# Patient Record
Sex: FEMALE | Race: BLACK OR AFRICAN AMERICAN | Employment: UNEMPLOYED | URBAN - METROPOLITAN AREA
[De-identification: names, ages, dates, MRNs, and addresses within clinical notes are randomized per-mention and may not be internally consistent; named-entity substitution may affect disease eponyms.]

---

## 2017-01-03 ENCOUNTER — TELEPHONE (OUTPATIENT)
Dept: INTERNAL MEDICINE CLINIC | Age: 62
End: 2017-01-03

## 2017-01-09 ENCOUNTER — OFFICE VISIT (OUTPATIENT)
Dept: INTERNAL MEDICINE CLINIC | Age: 62
End: 2017-01-09

## 2017-01-09 VITALS
HEART RATE: 93 BPM | BODY MASS INDEX: 40.67 KG/M2 | OXYGEN SATURATION: 97 % | HEIGHT: 62 IN | RESPIRATION RATE: 16 BRPM | WEIGHT: 221 LBS | SYSTOLIC BLOOD PRESSURE: 138 MMHG | DIASTOLIC BLOOD PRESSURE: 86 MMHG

## 2017-01-09 DIAGNOSIS — E78.2 MIXED HYPERLIPIDEMIA: Chronic | ICD-10-CM

## 2017-01-09 DIAGNOSIS — E11.9 TYPE 2 DIABETES MELLITUS WITHOUT COMPLICATION, UNSPECIFIED LONG TERM INSULIN USE STATUS: Primary | Chronic | ICD-10-CM

## 2017-01-09 DIAGNOSIS — G89.4 CHRONIC PAIN SYNDROME: Chronic | ICD-10-CM

## 2017-01-09 DIAGNOSIS — F41.9 ANXIETY: ICD-10-CM

## 2017-01-09 DIAGNOSIS — I10 ESSENTIAL HYPERTENSION: Chronic | ICD-10-CM

## 2017-01-09 LAB
A/G RATIO: 1.6 (ref 1.1–2.2)
ALBUMIN SERPL-MCNC: 4.4 G/DL (ref 3.4–5)
ALP BLD-CCNC: 63 U/L (ref 40–129)
ALT SERPL-CCNC: 15 U/L (ref 10–40)
ANION GAP SERPL CALCULATED.3IONS-SCNC: 14 MMOL/L (ref 3–16)
AST SERPL-CCNC: 14 U/L (ref 15–37)
BASOPHILS ABSOLUTE: 0.1 K/UL (ref 0–0.2)
BASOPHILS RELATIVE PERCENT: 0.6 %
BILIRUB SERPL-MCNC: 0.5 MG/DL (ref 0–1)
BUN BLDV-MCNC: 12 MG/DL (ref 7–20)
CALCIUM SERPL-MCNC: 9.7 MG/DL (ref 8.3–10.6)
CHLORIDE BLD-SCNC: 103 MMOL/L (ref 99–110)
CHOLESTEROL, TOTAL: 171 MG/DL (ref 0–199)
CO2: 24 MMOL/L (ref 21–32)
CREAT SERPL-MCNC: 1 MG/DL (ref 0.6–1.2)
EOSINOPHILS ABSOLUTE: 0.1 K/UL (ref 0–0.6)
EOSINOPHILS RELATIVE PERCENT: 0.7 %
GFR AFRICAN AMERICAN: >60
GFR NON-AFRICAN AMERICAN: 56
GLOBULIN: 2.8 G/DL
GLUCOSE BLD-MCNC: 96 MG/DL (ref 70–99)
HBA1C MFR BLD: 6.6 %
HCT VFR BLD CALC: 39.6 % (ref 36–48)
HDLC SERPL-MCNC: 45 MG/DL (ref 40–60)
HEMOGLOBIN: 13 G/DL (ref 12–16)
LDL CHOLESTEROL CALCULATED: 103 MG/DL
LYMPHOCYTES ABSOLUTE: 2.3 K/UL (ref 1–5.1)
LYMPHOCYTES RELATIVE PERCENT: 17.2 %
MCH RBC QN AUTO: 27.4 PG (ref 26–34)
MCHC RBC AUTO-ENTMCNC: 32.8 G/DL (ref 31–36)
MCV RBC AUTO: 83.6 FL (ref 80–100)
MONOCYTES ABSOLUTE: 0.6 K/UL (ref 0–1.3)
MONOCYTES RELATIVE PERCENT: 4.2 %
NEUTROPHILS ABSOLUTE: 10.1 K/UL (ref 1.7–7.7)
NEUTROPHILS RELATIVE PERCENT: 77.3 %
PDW BLD-RTO: 14.3 % (ref 12.4–15.4)
PLATELET # BLD: 283 K/UL (ref 135–450)
PMV BLD AUTO: 9.5 FL (ref 5–10.5)
POTASSIUM SERPL-SCNC: 4.2 MMOL/L (ref 3.5–5.1)
RBC # BLD: 4.74 M/UL (ref 4–5.2)
SODIUM BLD-SCNC: 141 MMOL/L (ref 136–145)
TOTAL PROTEIN: 7.2 G/DL (ref 6.4–8.2)
TRIGL SERPL-MCNC: 117 MG/DL (ref 0–150)
TSH SERPL DL<=0.05 MIU/L-ACNC: 3.48 UIU/ML (ref 0.27–4.2)
VLDLC SERPL CALC-MCNC: 23 MG/DL
WBC # BLD: 13.1 K/UL (ref 4–11)

## 2017-01-09 PROCEDURE — G0009 ADMIN PNEUMOCOCCAL VACCINE: HCPCS | Performed by: INTERNAL MEDICINE

## 2017-01-09 PROCEDURE — 90732 PPSV23 VACC 2 YRS+ SUBQ/IM: CPT | Performed by: INTERNAL MEDICINE

## 2017-01-09 PROCEDURE — 99214 OFFICE O/P EST MOD 30 MIN: CPT | Performed by: INTERNAL MEDICINE

## 2017-01-09 PROCEDURE — 83036 HEMOGLOBIN GLYCOSYLATED A1C: CPT | Performed by: INTERNAL MEDICINE

## 2017-01-09 PROCEDURE — 36415 COLL VENOUS BLD VENIPUNCTURE: CPT | Performed by: INTERNAL MEDICINE

## 2017-01-09 RX ORDER — HYDROCODONE BITARTRATE AND ACETAMINOPHEN 5; 325 MG/1; MG/1
1 TABLET ORAL EVERY 6 HOURS PRN
Qty: 60 TABLET | Refills: 0 | Status: SHIPPED | OUTPATIENT
Start: 2017-01-09 | End: 2017-02-28 | Stop reason: SDUPTHER

## 2017-01-09 RX ORDER — LISINOPRIL AND HYDROCHLOROTHIAZIDE 20; 12.5 MG/1; MG/1
TABLET ORAL
Qty: 30 TABLET | Refills: 6 | Status: SHIPPED | OUTPATIENT
Start: 2017-01-09 | End: 2017-08-17 | Stop reason: SDUPTHER

## 2017-01-09 RX ORDER — ALPRAZOLAM 0.5 MG/1
TABLET ORAL
Qty: 60 TABLET | Refills: 2 | Status: SHIPPED | OUTPATIENT
Start: 2017-01-09 | End: 2017-04-17 | Stop reason: SDUPTHER

## 2017-02-28 RX ORDER — HYDROCODONE BITARTRATE AND ACETAMINOPHEN 5; 325 MG/1; MG/1
1 TABLET ORAL EVERY 6 HOURS PRN
Qty: 60 TABLET | Refills: 0 | Status: SHIPPED | OUTPATIENT
Start: 2017-02-28 | End: 2017-04-17 | Stop reason: SDUPTHER

## 2017-04-17 ENCOUNTER — OFFICE VISIT (OUTPATIENT)
Dept: INTERNAL MEDICINE CLINIC | Age: 62
End: 2017-04-17

## 2017-04-17 VITALS
WEIGHT: 218 LBS | HEIGHT: 62 IN | RESPIRATION RATE: 16 BRPM | BODY MASS INDEX: 40.12 KG/M2 | DIASTOLIC BLOOD PRESSURE: 86 MMHG | SYSTOLIC BLOOD PRESSURE: 154 MMHG

## 2017-04-17 DIAGNOSIS — E78.2 MIXED HYPERLIPIDEMIA: Chronic | ICD-10-CM

## 2017-04-17 DIAGNOSIS — E11.21 DIABETIC NEPHROPATHY ASSOCIATED WITH TYPE 2 DIABETES MELLITUS (HCC): Chronic | ICD-10-CM

## 2017-04-17 DIAGNOSIS — G89.4 CHRONIC PAIN SYNDROME: Chronic | ICD-10-CM

## 2017-04-17 DIAGNOSIS — E11.9 TYPE 2 DIABETES MELLITUS WITHOUT COMPLICATION, UNSPECIFIED LONG TERM INSULIN USE STATUS: Primary | Chronic | ICD-10-CM

## 2017-04-17 DIAGNOSIS — I10 ESSENTIAL HYPERTENSION: Chronic | ICD-10-CM

## 2017-04-17 DIAGNOSIS — F41.9 ANXIETY: ICD-10-CM

## 2017-04-17 LAB — HBA1C MFR BLD: 6.5 %

## 2017-04-17 PROCEDURE — 99214 OFFICE O/P EST MOD 30 MIN: CPT | Performed by: INTERNAL MEDICINE

## 2017-04-17 PROCEDURE — 83036 HEMOGLOBIN GLYCOSYLATED A1C: CPT | Performed by: INTERNAL MEDICINE

## 2017-04-17 RX ORDER — ALPRAZOLAM 0.5 MG/1
TABLET ORAL
Qty: 60 TABLET | Refills: 2 | Status: SHIPPED | OUTPATIENT
Start: 2017-04-17 | End: 2017-08-17 | Stop reason: SDUPTHER

## 2017-04-17 RX ORDER — HYDROCODONE BITARTRATE AND ACETAMINOPHEN 5; 325 MG/1; MG/1
1 TABLET ORAL EVERY 6 HOURS PRN
Qty: 60 TABLET | Refills: 0 | Status: SHIPPED | OUTPATIENT
Start: 2017-04-17 | End: 2017-06-27 | Stop reason: SDUPTHER

## 2017-05-04 ENCOUNTER — TELEPHONE (OUTPATIENT)
Dept: INTERNAL MEDICINE CLINIC | Age: 62
End: 2017-05-04

## 2017-05-10 ENCOUNTER — TELEPHONE (OUTPATIENT)
Dept: INTERNAL MEDICINE CLINIC | Age: 62
End: 2017-05-10

## 2017-06-26 ENCOUNTER — TELEPHONE (OUTPATIENT)
Dept: INTERNAL MEDICINE CLINIC | Age: 62
End: 2017-06-26

## 2017-06-26 RX ORDER — ATORVASTATIN CALCIUM 20 MG/1
TABLET, FILM COATED ORAL
Qty: 90 TABLET | Refills: 5 | Status: SHIPPED | OUTPATIENT
Start: 2017-06-26 | End: 2018-09-24 | Stop reason: SDUPTHER

## 2017-06-26 RX ORDER — HYDROCODONE BITARTRATE AND ACETAMINOPHEN 5; 325 MG/1; MG/1
1 TABLET ORAL EVERY 6 HOURS PRN
Qty: 60 TABLET | Refills: 0 | Status: CANCELLED | OUTPATIENT
Start: 2017-06-26

## 2017-06-27 RX ORDER — HYDROCODONE BITARTRATE AND ACETAMINOPHEN 5; 325 MG/1; MG/1
1 TABLET ORAL EVERY 6 HOURS PRN
Qty: 60 TABLET | Refills: 0 | Status: SHIPPED | OUTPATIENT
Start: 2017-06-27 | End: 2017-08-17 | Stop reason: SDUPTHER

## 2017-07-03 RX ORDER — ESOMEPRAZOLE MAGNESIUM 40 MG/1
CAPSULE, DELAYED RELEASE ORAL
Qty: 30 CAPSULE | Refills: 5 | Status: SHIPPED | OUTPATIENT
Start: 2017-07-03 | End: 2017-11-21 | Stop reason: SDUPTHER

## 2017-07-10 RX ORDER — INSULIN GLARGINE 100 [IU]/ML
INJECTION, SOLUTION SUBCUTANEOUS
Qty: 3 VIAL | Refills: 3 | Status: SHIPPED | OUTPATIENT
Start: 2017-07-10 | End: 2017-12-09 | Stop reason: SDUPTHER

## 2017-07-20 ENCOUNTER — OFFICE VISIT (OUTPATIENT)
Dept: INTERNAL MEDICINE CLINIC | Age: 62
End: 2017-07-20

## 2017-07-20 VITALS — HEIGHT: 62 IN | WEIGHT: 218 LBS | RESPIRATION RATE: 16 BRPM | TEMPERATURE: 98.3 F | BODY MASS INDEX: 40.12 KG/M2

## 2017-07-20 DIAGNOSIS — L03.116 CELLULITIS OF LEFT LOWER EXTREMITY: Primary | ICD-10-CM
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
## 2017-07-20 PROCEDURE — 4010F ACE/ARB THERAPY RXD/TAKEN: CPT | Performed by: INTERNAL MEDICINE

## 2017-07-20 PROCEDURE — 99213 OFFICE O/P EST LOW 20 MIN: CPT | Performed by: INTERNAL MEDICINE

## 2017-07-20 RX ORDER — CEPHALEXIN 500 MG/1
500 CAPSULE ORAL 3 TIMES DAILY
Qty: 21 CAPSULE | Refills: 0 | Status: SHIPPED | OUTPATIENT
Start: 2017-07-20 | End: 2017-07-27

## 2017-07-20 ASSESSMENT — PATIENT HEALTH QUESTIONNAIRE - PHQ9
SUM OF ALL RESPONSES TO PHQ QUESTIONS 1-9: 0
SUM OF ALL RESPONSES TO PHQ9 QUESTIONS 1 & 2: 0
2. FEELING DOWN, DEPRESSED OR HOPELESS: 0
1. LITTLE INTEREST OR PLEASURE IN DOING THINGS: 0

## 2017-08-17 ENCOUNTER — OFFICE VISIT (OUTPATIENT)
Dept: INTERNAL MEDICINE CLINIC | Age: 62
End: 2017-08-17

## 2017-08-17 VITALS
HEIGHT: 62 IN | DIASTOLIC BLOOD PRESSURE: 86 MMHG | RESPIRATION RATE: 16 BRPM | SYSTOLIC BLOOD PRESSURE: 138 MMHG | WEIGHT: 209 LBS | BODY MASS INDEX: 38.46 KG/M2

## 2017-08-17 DIAGNOSIS — I10 ESSENTIAL HYPERTENSION: Chronic | ICD-10-CM

## 2017-08-17 DIAGNOSIS — E78.2 MIXED HYPERLIPIDEMIA: Chronic | ICD-10-CM

## 2017-08-17 DIAGNOSIS — E11.9 TYPE 2 DIABETES MELLITUS WITHOUT COMPLICATION, UNSPECIFIED LONG TERM INSULIN USE STATUS: Primary | Chronic | ICD-10-CM

## 2017-08-17 DIAGNOSIS — F41.9 ANXIETY: ICD-10-CM

## 2017-08-17 DIAGNOSIS — G89.4 CHRONIC PAIN SYNDROME: Chronic | ICD-10-CM

## 2017-08-17 LAB — HBA1C MFR BLD: 6.1 %

## 2017-08-17 PROCEDURE — 99214 OFFICE O/P EST MOD 30 MIN: CPT | Performed by: INTERNAL MEDICINE

## 2017-08-17 PROCEDURE — 83036 HEMOGLOBIN GLYCOSYLATED A1C: CPT | Performed by: INTERNAL MEDICINE

## 2017-08-17 RX ORDER — HYDROCODONE BITARTRATE AND ACETAMINOPHEN 5; 325 MG/1; MG/1
1 TABLET ORAL EVERY 6 HOURS PRN
Qty: 60 TABLET | Refills: 0 | Status: SHIPPED | OUTPATIENT
Start: 2017-08-17 | End: 2017-10-18 | Stop reason: SDUPTHER

## 2017-08-17 RX ORDER — ALPRAZOLAM 0.5 MG/1
TABLET ORAL
Qty: 60 TABLET | Refills: 2 | Status: SHIPPED | OUTPATIENT
Start: 2017-08-17 | End: 2018-02-22 | Stop reason: SDUPTHER

## 2017-08-17 RX ORDER — POTASSIUM CHLORIDE 20 MEQ/1
TABLET, EXTENDED RELEASE ORAL
Qty: 30 TABLET | Refills: 5 | Status: SHIPPED | OUTPATIENT
Start: 2017-08-17 | End: 2018-03-22 | Stop reason: SDUPTHER

## 2017-08-17 RX ORDER — LISINOPRIL AND HYDROCHLOROTHIAZIDE 20; 12.5 MG/1; MG/1
TABLET ORAL
Qty: 30 TABLET | Refills: 6 | Status: SHIPPED | OUTPATIENT
Start: 2017-08-17 | End: 2018-02-14 | Stop reason: SDUPTHER

## 2017-08-18 ENCOUNTER — NURSE ONLY (OUTPATIENT)
Dept: INTERNAL MEDICINE CLINIC | Age: 62
End: 2017-08-18

## 2017-08-18 DIAGNOSIS — Z00.00 HEALTHCARE MAINTENANCE: Primary | ICD-10-CM

## 2017-08-18 LAB
A/G RATIO: 1.7 (ref 1.1–2.2)
ALBUMIN SERPL-MCNC: 4.3 G/DL (ref 3.4–5)
ALP BLD-CCNC: 42 U/L (ref 40–129)
ALT SERPL-CCNC: 20 U/L (ref 10–40)
ANION GAP SERPL CALCULATED.3IONS-SCNC: 15 MMOL/L (ref 3–16)
AST SERPL-CCNC: 18 U/L (ref 15–37)
BASOPHILS ABSOLUTE: 0.1 K/UL (ref 0–0.2)
BASOPHILS RELATIVE PERCENT: 0.9 %
BILIRUB SERPL-MCNC: 0.3 MG/DL (ref 0–1)
BUN BLDV-MCNC: 17 MG/DL (ref 7–20)
CALCIUM SERPL-MCNC: 9.1 MG/DL (ref 8.3–10.6)
CHLORIDE BLD-SCNC: 102 MMOL/L (ref 99–110)
CHOLESTEROL, TOTAL: 119 MG/DL (ref 0–199)
CO2: 23 MMOL/L (ref 21–32)
CREAT SERPL-MCNC: 0.8 MG/DL (ref 0.6–1.2)
EOSINOPHILS ABSOLUTE: 0.2 K/UL (ref 0–0.6)
EOSINOPHILS RELATIVE PERCENT: 2 %
GFR AFRICAN AMERICAN: >60
GFR NON-AFRICAN AMERICAN: >60
GLOBULIN: 2.6 G/DL
GLUCOSE BLD-MCNC: 85 MG/DL (ref 70–99)
HCT VFR BLD CALC: 38.3 % (ref 36–48)
HDLC SERPL-MCNC: 40 MG/DL (ref 40–60)
HEMOGLOBIN: 12.5 G/DL (ref 12–16)
LDL CHOLESTEROL CALCULATED: 64 MG/DL
LYMPHOCYTES ABSOLUTE: 1.8 K/UL (ref 1–5.1)
LYMPHOCYTES RELATIVE PERCENT: 20.1 %
MCH RBC QN AUTO: 27.3 PG (ref 26–34)
MCHC RBC AUTO-ENTMCNC: 32.5 G/DL (ref 31–36)
MCV RBC AUTO: 84.1 FL (ref 80–100)
MONOCYTES ABSOLUTE: 0.5 K/UL (ref 0–1.3)
MONOCYTES RELATIVE PERCENT: 5.1 %
NEUTROPHILS ABSOLUTE: 6.6 K/UL (ref 1.7–7.7)
NEUTROPHILS RELATIVE PERCENT: 71.9 %
PDW BLD-RTO: 14.3 % (ref 12.4–15.4)
PLATELET # BLD: 279 K/UL (ref 135–450)
PMV BLD AUTO: 9.7 FL (ref 5–10.5)
POTASSIUM SERPL-SCNC: 3.9 MMOL/L (ref 3.5–5.1)
RBC # BLD: 4.56 M/UL (ref 4–5.2)
SODIUM BLD-SCNC: 140 MMOL/L (ref 136–145)
TOTAL PROTEIN: 6.9 G/DL (ref 6.4–8.2)
TRIGL SERPL-MCNC: 75 MG/DL (ref 0–150)
TSH SERPL DL<=0.05 MIU/L-ACNC: 3.47 UIU/ML (ref 0.27–4.2)
VLDLC SERPL CALC-MCNC: 15 MG/DL
WBC # BLD: 9.2 K/UL (ref 4–11)

## 2017-08-18 PROCEDURE — 36415 COLL VENOUS BLD VENIPUNCTURE: CPT | Performed by: INTERNAL MEDICINE

## 2017-09-28 ENCOUNTER — TELEPHONE (OUTPATIENT)
Dept: INTERNAL MEDICINE CLINIC | Age: 62
End: 2017-09-28

## 2017-09-28 RX ORDER — OXYBUTYNIN CHLORIDE 10 MG/1
10 TABLET, EXTENDED RELEASE ORAL DAILY
Qty: 30 TABLET | Refills: 3 | Status: SHIPPED | OUTPATIENT
Start: 2017-09-28 | End: 2019-09-05

## 2017-10-17 ENCOUNTER — TELEPHONE (OUTPATIENT)
Dept: INTERNAL MEDICINE CLINIC | Age: 62
End: 2017-10-17

## 2017-10-17 NOTE — TELEPHONE ENCOUNTER
Bushra Aguilar called to request an Rx for [place] pain med. Hydrocodone-Apap 5-325 mg, #60, 1 po q 6 hrs prn.

## 2017-10-18 RX ORDER — HYDROCODONE BITARTRATE AND ACETAMINOPHEN 5; 325 MG/1; MG/1
1 TABLET ORAL EVERY 6 HOURS PRN
Qty: 60 TABLET | Refills: 0 | Status: SHIPPED | OUTPATIENT
Start: 2017-10-18 | End: 2017-11-21 | Stop reason: SDUPTHER

## 2017-11-20 RX ORDER — SYRINGE-NEEDLE,INSULIN,0.5 ML 31 GX5/16"
SYRINGE, EMPTY DISPOSABLE MISCELLANEOUS
Qty: 100 EACH | Refills: 5 | Status: SHIPPED | OUTPATIENT
Start: 2017-11-20 | End: 2019-05-16

## 2017-11-21 ENCOUNTER — OFFICE VISIT (OUTPATIENT)
Dept: INTERNAL MEDICINE CLINIC | Age: 62
End: 2017-11-21

## 2017-11-21 VITALS
RESPIRATION RATE: 16 BRPM | HEIGHT: 62 IN | WEIGHT: 204 LBS | BODY MASS INDEX: 37.54 KG/M2 | DIASTOLIC BLOOD PRESSURE: 84 MMHG | SYSTOLIC BLOOD PRESSURE: 130 MMHG

## 2017-11-21 DIAGNOSIS — E11.9 TYPE 2 DIABETES MELLITUS WITHOUT COMPLICATION, UNSPECIFIED LONG TERM INSULIN USE STATUS: Primary | Chronic | ICD-10-CM

## 2017-11-21 DIAGNOSIS — F33.42 RECURRENT MAJOR DEPRESSIVE EPISODES, IN FULL REMISSION (HCC): ICD-10-CM

## 2017-11-21 DIAGNOSIS — E11.21 DIABETIC NEPHROPATHY ASSOCIATED WITH TYPE 2 DIABETES MELLITUS (HCC): Chronic | ICD-10-CM

## 2017-11-21 DIAGNOSIS — F41.9 ANXIETY: ICD-10-CM

## 2017-11-21 DIAGNOSIS — I10 ESSENTIAL HYPERTENSION: Chronic | ICD-10-CM

## 2017-11-21 DIAGNOSIS — Z12.39 SCREENING FOR BREAST CANCER: ICD-10-CM

## 2017-11-21 DIAGNOSIS — G89.4 CHRONIC PAIN SYNDROME: Chronic | ICD-10-CM

## 2017-11-21 DIAGNOSIS — E78.2 MIXED HYPERLIPIDEMIA: Chronic | ICD-10-CM

## 2017-11-21 DIAGNOSIS — F33.1 MODERATE EPISODE OF RECURRENT MAJOR DEPRESSIVE DISORDER (HCC): Chronic | ICD-10-CM

## 2017-11-21 LAB
CREATININE URINE: 52.8 MG/DL (ref 28–259)
HBA1C MFR BLD: 6 %
MICROALBUMIN UR-MCNC: 49.7 MG/DL
MICROALBUMIN/CREAT UR-RTO: 941.3 MG/G (ref 0–30)

## 2017-11-21 PROCEDURE — 3017F COLORECTAL CA SCREEN DOC REV: CPT | Performed by: INTERNAL MEDICINE

## 2017-11-21 PROCEDURE — 3044F HG A1C LEVEL LT 7.0%: CPT | Performed by: INTERNAL MEDICINE

## 2017-11-21 PROCEDURE — 99214 OFFICE O/P EST MOD 30 MIN: CPT | Performed by: INTERNAL MEDICINE

## 2017-11-21 PROCEDURE — 3014F SCREEN MAMMO DOC REV: CPT | Performed by: INTERNAL MEDICINE

## 2017-11-21 PROCEDURE — 1036F TOBACCO NON-USER: CPT | Performed by: INTERNAL MEDICINE

## 2017-11-21 PROCEDURE — 83036 HEMOGLOBIN GLYCOSYLATED A1C: CPT | Performed by: INTERNAL MEDICINE

## 2017-11-21 PROCEDURE — G8599 NO ASA/ANTIPLAT THER USE RNG: HCPCS | Performed by: INTERNAL MEDICINE

## 2017-11-21 PROCEDURE — G8484 FLU IMMUNIZE NO ADMIN: HCPCS | Performed by: INTERNAL MEDICINE

## 2017-11-21 PROCEDURE — G8417 CALC BMI ABV UP PARAM F/U: HCPCS | Performed by: INTERNAL MEDICINE

## 2017-11-21 PROCEDURE — G8427 DOCREV CUR MEDS BY ELIG CLIN: HCPCS | Performed by: INTERNAL MEDICINE

## 2017-11-21 RX ORDER — ESOMEPRAZOLE MAGNESIUM 40 MG/1
CAPSULE, DELAYED RELEASE ORAL
Qty: 30 CAPSULE | Refills: 5 | Status: SHIPPED | OUTPATIENT
Start: 2017-11-21 | End: 2018-05-29 | Stop reason: SDUPTHER

## 2017-11-21 RX ORDER — HYDROCODONE BITARTRATE AND ACETAMINOPHEN 5; 325 MG/1; MG/1
1 TABLET ORAL EVERY 6 HOURS PRN
Qty: 60 TABLET | Refills: 0 | Status: SHIPPED | OUTPATIENT
Start: 2017-11-21 | End: 2018-02-22 | Stop reason: SDUPTHER

## 2017-11-21 NOTE — ASSESSMENT & PLAN NOTE
I counseled the patient for 10 minutes, on the importance of avoiding and trying to wean slowly off the pain medications. I explained to the patient the downside of continuing on the pain medications/benzodiazepines and their addictive and physical dependance properties. Patient expresses understanding. Controlled Substances Monitoring:     Attestation: The Prescription Monitoring Report for this patient was reviewed today. Emilia Ross MD)  Documentation: Possible medication side effects, risk of tolerance and/or dependence, and alternative treatments discussed., No signs of potential drug abuse or diversion identified., Existing medication contract. Emilia Ross MD)  Chronic Pain: Dose reduction has been attempted., Functional status reviewed - continues with improved or maintaining ADL's., Treatment objectives documented - patient is progressing appropriately.  Emilia Ross MD)

## 2017-11-21 NOTE — ASSESSMENT & PLAN NOTE
This has been a chronic problem, takes meds regularly. Monitors diet and tries to follow a low fat diet. Not been very compliant w exercise. Lipids have been stable, The problem is controlled. Recent lipid tests were reviewed and are normal. Pertinent negatives include no chest pain, focal sensory loss, focal weakness, leg pain, myalgias or shortness of breath. Advised patient to continue the current instructions or medications.

## 2017-11-21 NOTE — PROGRESS NOTES
Subjective:      Patient ID: Jeni Sahu is a 65 y.o. female. HPI  Established patient here for a visit to manage chronic medical conditions as detailed below. Diabetic nephropathy associated with type 2 diabetes mellitus ([address]  This problem was reviewed in detail. It is under control and stable. Will check blood work. Blood sugars have been ok, will monitor closely and cover with sliding scale insulin coverage and continue current medications. BP Readings from Last 2 Encounters:   08/17/17 138/86   04/17/17 (!) 154/86     Hemoglobin A1C (%)   Date Value   08/17/2017 6.1     MICROALBUMIN, RANDOM URINE (mg/dL)   Date Value   06/28/2016 17.30 (H)     LDL Calculated (mg/dL)   Date Value   08/18/2017 64              Tobacco use:  Patient  reports that she has never smoked. She has never used smokeless tobacco.    If Smoker - Cessation materials given? NA    Is Daily aspirin on medication list?:  Yes    Diabetic retinal exam done? Yes   If yes, document in Health Maintenance. Monofilament placed on counter? Yes    Shoes and socks removed? Yes    BP taken with correct size cuff? Yes   Repeated if > 130/80 Yes     Microalbumin performed if applicable? Yes      Mixed hyperlipidemia  This has been a chronic problem, takes meds regularly. Monitors diet and tries to follow a low fat diet. Not been very compliant w exercise. Lipids have been stable, The problem is controlled. Recent lipid tests were reviewed and are normal. Pertinent negatives include no chest pain, focal sensory loss, focal weakness, leg pain, myalgias or shortness of breath. Advised patient to continue the current instructions or medications. Essential hypertension  This is a chronic problem. The problem is well controlled. Patient monitors readings regularly. Pertinent negatives include no chest pain, focal sensory loss, focal weakness, leg pain, myalgias or shortness of breath. No headaches or chest pain.  Takes medications nausea or vommitting or diarrhea. No jaundice or abdominal pain, change in bowel habits, black or bloody stools. No dysuria or hematuria or frequency of urination. No myalgias or muscle pain. No numbness, weakness, or tingling. No falls, or loss of consciousness. No weight loss or back pain. No falls. No paresthesias. No joint swelling or redness. No joint pain. No recent weight loss. No focal weakness or sensory deficits or paresthesias, No confusion or altered sensorium. No hematemesis. No hearing loss. No siezures. All other systems were reviewed, and review was negative. Objective:   Physical Exam  /84 (Site: Left Arm, Position: Sitting, Cuff Size: Large Adult)   Resp 16   Ht 5' 2\" (1.575 m)   Wt 204 lb (92.5 kg)   BMI 37.31 kg/m²    The physical exam reveals a patient who appears well, alert and oriented x 3, pleasant, cooperative. Vitals are as noted. Head is atraumatic and normocephalic. Eyes reveal normal conjunctiva, cornea normal, pupils are equal and rective to light. Nasal mucosa is normal. Throat is normal without exudates. Ears reveal normal tympanic membranes. Neck is supple and free of adenopathy, or masses. No thyromegaly. No jugular venous distension. Lungs are clear to auscultation, no rales or rhonchi noted. Heart sounds are regular , no murmurs, clicks, gallops or rubs. Abdomen is soft, no tenderness, masses or organomegaly. Bowel sounds are normally heard. Pelvis: normal. Extremities are normal. Peripheral pulses are normal. Screening neurological exam is normal without focal findings. Cranial nerves are intact, reflexes are symmetrical and muscle strength eaqual. Skin is normal without suspicious lesions noted. Assessment:      Diabetic nephropathy associated with type 2 diabetes mellitus ([facility] [address]  This problem was reviewed in detail. It is under control and stable. Will check blood work.    Blood sugars have been ok, will monitor closely and cover with sliding scale insulin

## 2017-12-11 ENCOUNTER — TELEPHONE (OUTPATIENT)
Dept: INTERNAL MEDICINE CLINIC | Age: 62
End: 2017-12-11

## 2017-12-11 RX ORDER — INSULIN GLARGINE 100 [IU]/ML
INJECTION, SOLUTION SUBCUTANEOUS
Qty: 30 VIAL | Refills: 5 | Status: SHIPPED | OUTPATIENT
Start: 2017-12-11 | End: 2018-08-31 | Stop reason: SDUPTHER

## 2017-12-12 RX ORDER — FUROSEMIDE 40 MG/1
TABLET ORAL
Qty: 90 TABLET | Refills: 3 | Status: SHIPPED | OUTPATIENT
Start: 2017-12-12 | End: 2018-12-03 | Stop reason: SDUPTHER

## 2017-12-12 NOTE — TELEPHONE ENCOUNTER
Spoke with Kenji Castrejon of all information per Dr. Rachele Castrejon. Kenji Castrejon would like to know what medication Ivie Apgar is taking that is treating the Microalbumin? She states that pt is not taking lisinopril anymore.   Please advise

## 2017-12-13 ENCOUNTER — TELEPHONE (OUTPATIENT)
Dept: INTERNAL MEDICINE CLINIC | Age: 62
End: 2017-12-13

## 2017-12-13 NOTE — TELEPHONE ENCOUNTER
Pt said someone called her yesterday about 4:40 pm.  She said whoever she talked to said  something about pt taking Lisinopril but pt hasn' t taken that for a long time,  She has been on Zestoretic. Roly wants a call.

## 2017-12-21 ENCOUNTER — TELEPHONE (OUTPATIENT)
Dept: INTERNAL MEDICINE CLINIC | Age: 62
End: 2017-12-21

## 2017-12-21 NOTE — TELEPHONE ENCOUNTER
Pt presented rx for norco today. It was written Nov 21. That's over the 14 day limit. Is it ok to fill it today?

## 2018-01-02 ENCOUNTER — TELEPHONE (OUTPATIENT)
Dept: INTERNAL MEDICINE CLINIC | Age: 63
End: 2018-01-02

## 2018-01-02 RX ORDER — CEPHALEXIN 500 MG/1
500 CAPSULE ORAL 3 TIMES DAILY
Qty: 21 CAPSULE | Refills: 0 | Status: SHIPPED | OUTPATIENT
Start: 2018-01-02 | End: 2018-01-09

## 2018-01-04 ENCOUNTER — TELEPHONE (OUTPATIENT)
Dept: INTERNAL MEDICINE CLINIC | Age: 63
End: 2018-01-04

## 2018-01-15 ENCOUNTER — OFFICE VISIT (OUTPATIENT)
Dept: INTERNAL MEDICINE CLINIC | Age: 63
End: 2018-01-15

## 2018-01-15 ENCOUNTER — TELEPHONE (OUTPATIENT)
Dept: FAMILY MEDICINE CLINIC | Age: 63
End: 2018-01-15

## 2018-01-15 VITALS
SYSTOLIC BLOOD PRESSURE: 132 MMHG | RESPIRATION RATE: 16 BRPM | DIASTOLIC BLOOD PRESSURE: 86 MMHG | HEART RATE: 88 BPM | HEIGHT: 62 IN

## 2018-01-15 DIAGNOSIS — F33.42 RECURRENT MAJOR DEPRESSIVE EPISODES, IN FULL REMISSION (HCC): ICD-10-CM

## 2018-01-15 DIAGNOSIS — E11.21 TYPE 2 DIABETES MELLITUS WITH DIABETIC NEPHROPATHY, WITH LONG-TERM CURRENT USE OF INSULIN (HCC): ICD-10-CM

## 2018-01-15 DIAGNOSIS — Z79.4 TYPE 2 DIABETES MELLITUS WITH DIABETIC NEPHROPATHY, WITH LONG-TERM CURRENT USE OF INSULIN (HCC): ICD-10-CM

## 2018-01-15 DIAGNOSIS — L08.9 SKIN INFECTION: Primary | ICD-10-CM

## 2018-01-15 PROCEDURE — G8484 FLU IMMUNIZE NO ADMIN: HCPCS | Performed by: INTERNAL MEDICINE

## 2018-01-15 PROCEDURE — 3017F COLORECTAL CA SCREEN DOC REV: CPT | Performed by: INTERNAL MEDICINE

## 2018-01-15 PROCEDURE — 3046F HEMOGLOBIN A1C LEVEL >9.0%: CPT | Performed by: INTERNAL MEDICINE

## 2018-01-15 PROCEDURE — G8417 CALC BMI ABV UP PARAM F/U: HCPCS | Performed by: INTERNAL MEDICINE

## 2018-01-15 PROCEDURE — 3014F SCREEN MAMMO DOC REV: CPT | Performed by: INTERNAL MEDICINE

## 2018-01-15 PROCEDURE — G8427 DOCREV CUR MEDS BY ELIG CLIN: HCPCS | Performed by: INTERNAL MEDICINE

## 2018-01-15 PROCEDURE — 1036F TOBACCO NON-USER: CPT | Performed by: INTERNAL MEDICINE

## 2018-01-15 PROCEDURE — G8599 NO ASA/ANTIPLAT THER USE RNG: HCPCS | Performed by: INTERNAL MEDICINE

## 2018-01-15 PROCEDURE — 99213 OFFICE O/P EST LOW 20 MIN: CPT | Performed by: INTERNAL MEDICINE

## 2018-01-15 RX ORDER — CLINDAMYCIN HYDROCHLORIDE 150 MG/1
150 CAPSULE ORAL 4 TIMES DAILY
Qty: 21 CAPSULE | Refills: 0 | Status: SHIPPED | OUTPATIENT
Start: 2018-01-15 | End: 2018-01-22

## 2018-01-15 NOTE — TELEPHONE ENCOUNTER
They have a script for Clindamycin 150 mg #21,  take 1 po qid for 7 days. The quantity  should be for #28. Call to clarify.

## 2018-02-14 ENCOUNTER — TELEPHONE (OUTPATIENT)
Dept: INTERNAL MEDICINE CLINIC | Age: 63
End: 2018-02-14

## 2018-02-14 RX ORDER — LISINOPRIL AND HYDROCHLOROTHIAZIDE 20; 12.5 MG/1; MG/1
TABLET ORAL
Qty: 30 TABLET | Refills: 2 | Status: SHIPPED | OUTPATIENT
Start: 2018-02-14 | End: 2018-08-23 | Stop reason: SDUPTHER

## 2018-02-22 ENCOUNTER — OFFICE VISIT (OUTPATIENT)
Dept: INTERNAL MEDICINE CLINIC | Age: 63
End: 2018-02-22

## 2018-02-22 VITALS
DIASTOLIC BLOOD PRESSURE: 86 MMHG | WEIGHT: 202 LBS | BODY MASS INDEX: 37.17 KG/M2 | HEIGHT: 62 IN | SYSTOLIC BLOOD PRESSURE: 132 MMHG | RESPIRATION RATE: 16 BRPM

## 2018-02-22 DIAGNOSIS — E78.2 MIXED HYPERLIPIDEMIA: Chronic | ICD-10-CM

## 2018-02-22 DIAGNOSIS — E11.21 DIABETIC NEPHROPATHY ASSOCIATED WITH TYPE 2 DIABETES MELLITUS (HCC): Primary | Chronic | ICD-10-CM

## 2018-02-22 DIAGNOSIS — I63.419 CEREBROVASCULAR ACCIDENT (CVA) DUE TO EMBOLISM OF MIDDLE CEREBRAL ARTERY, UNSPECIFIED BLOOD VESSEL LATERALITY (HCC): ICD-10-CM

## 2018-02-22 DIAGNOSIS — E11.9 TYPE 2 DIABETES MELLITUS WITHOUT COMPLICATION, UNSPECIFIED LONG TERM INSULIN USE STATUS: Chronic | ICD-10-CM

## 2018-02-22 DIAGNOSIS — G89.4 CHRONIC PAIN SYNDROME: Primary | Chronic | ICD-10-CM

## 2018-02-22 DIAGNOSIS — G89.4 CHRONIC PAIN SYNDROME: Chronic | ICD-10-CM

## 2018-02-22 DIAGNOSIS — Z12.11 SCREENING FOR COLON CANCER: ICD-10-CM

## 2018-02-22 DIAGNOSIS — F33.1 MODERATE EPISODE OF RECURRENT MAJOR DEPRESSIVE DISORDER (HCC): Chronic | ICD-10-CM

## 2018-02-22 DIAGNOSIS — I10 ESSENTIAL HYPERTENSION: Chronic | ICD-10-CM

## 2018-02-22 LAB
A/G RATIO: 1.6 (ref 1.1–2.2)
ALBUMIN SERPL-MCNC: 4.4 G/DL (ref 3.4–5)
ALP BLD-CCNC: 50 U/L (ref 40–129)
ALT SERPL-CCNC: 15 U/L (ref 10–40)
ANION GAP SERPL CALCULATED.3IONS-SCNC: 15 MMOL/L (ref 3–16)
AST SERPL-CCNC: 17 U/L (ref 15–37)
BASOPHILS ABSOLUTE: 0.1 K/UL (ref 0–0.2)
BASOPHILS RELATIVE PERCENT: 0.8 %
BILIRUB SERPL-MCNC: 0.4 MG/DL (ref 0–1)
BUN BLDV-MCNC: 15 MG/DL (ref 7–20)
CALCIUM SERPL-MCNC: 9.5 MG/DL (ref 8.3–10.6)
CHLORIDE BLD-SCNC: 102 MMOL/L (ref 99–110)
CHOLESTEROL, TOTAL: 149 MG/DL (ref 0–199)
CO2: 26 MMOL/L (ref 21–32)
CREAT SERPL-MCNC: 0.9 MG/DL (ref 0.6–1.2)
EOSINOPHILS ABSOLUTE: 0.1 K/UL (ref 0–0.6)
EOSINOPHILS RELATIVE PERCENT: 1 %
GFR AFRICAN AMERICAN: >60
GFR NON-AFRICAN AMERICAN: >60
GLOBULIN: 2.7 G/DL
GLUCOSE BLD-MCNC: 86 MG/DL (ref 70–99)
HBA1C MFR BLD: 5.9 %
HCT VFR BLD CALC: 37.6 % (ref 36–48)
HDLC SERPL-MCNC: 50 MG/DL (ref 40–60)
HEMOGLOBIN: 12.7 G/DL (ref 12–16)
LDL CHOLESTEROL CALCULATED: 78 MG/DL
LYMPHOCYTES ABSOLUTE: 1.9 K/UL (ref 1–5.1)
LYMPHOCYTES RELATIVE PERCENT: 16.1 %
MCH RBC QN AUTO: 28.3 PG (ref 26–34)
MCHC RBC AUTO-ENTMCNC: 33.9 G/DL (ref 31–36)
MCV RBC AUTO: 83.4 FL (ref 80–100)
MONOCYTES ABSOLUTE: 0.5 K/UL (ref 0–1.3)
MONOCYTES RELATIVE PERCENT: 4.6 %
NEUTROPHILS ABSOLUTE: 9.3 K/UL (ref 1.7–7.7)
NEUTROPHILS RELATIVE PERCENT: 77.5 %
PDW BLD-RTO: 14.4 % (ref 12.4–15.4)
PLATELET # BLD: 288 K/UL (ref 135–450)
PMV BLD AUTO: 9.6 FL (ref 5–10.5)
POTASSIUM SERPL-SCNC: 4 MMOL/L (ref 3.5–5.1)
RBC # BLD: 4.5 M/UL (ref 4–5.2)
SODIUM BLD-SCNC: 143 MMOL/L (ref 136–145)
TOTAL PROTEIN: 7.1 G/DL (ref 6.4–8.2)
TRIGL SERPL-MCNC: 105 MG/DL (ref 0–150)
TSH SERPL DL<=0.05 MIU/L-ACNC: 3.23 UIU/ML (ref 0.27–4.2)
VLDLC SERPL CALC-MCNC: 21 MG/DL
WBC # BLD: 12 K/UL (ref 4–11)

## 2018-02-22 PROCEDURE — 3014F SCREEN MAMMO DOC REV: CPT | Performed by: INTERNAL MEDICINE

## 2018-02-22 PROCEDURE — 1036F TOBACCO NON-USER: CPT | Performed by: INTERNAL MEDICINE

## 2018-02-22 PROCEDURE — G8417 CALC BMI ABV UP PARAM F/U: HCPCS | Performed by: INTERNAL MEDICINE

## 2018-02-22 PROCEDURE — G8484 FLU IMMUNIZE NO ADMIN: HCPCS | Performed by: INTERNAL MEDICINE

## 2018-02-22 PROCEDURE — G8599 NO ASA/ANTIPLAT THER USE RNG: HCPCS | Performed by: INTERNAL MEDICINE

## 2018-02-22 PROCEDURE — 36415 COLL VENOUS BLD VENIPUNCTURE: CPT | Performed by: INTERNAL MEDICINE

## 2018-02-22 PROCEDURE — 3044F HG A1C LEVEL LT 7.0%: CPT | Performed by: INTERNAL MEDICINE

## 2018-02-22 PROCEDURE — 99214 OFFICE O/P EST MOD 30 MIN: CPT | Performed by: INTERNAL MEDICINE

## 2018-02-22 PROCEDURE — G8427 DOCREV CUR MEDS BY ELIG CLIN: HCPCS | Performed by: INTERNAL MEDICINE

## 2018-02-22 PROCEDURE — 3017F COLORECTAL CA SCREEN DOC REV: CPT | Performed by: INTERNAL MEDICINE

## 2018-02-22 PROCEDURE — 83036 HEMOGLOBIN GLYCOSYLATED A1C: CPT | Performed by: INTERNAL MEDICINE

## 2018-02-22 RX ORDER — ALPRAZOLAM 0.5 MG/1
TABLET ORAL
Qty: 60 TABLET | Refills: 2 | Status: SHIPPED | OUTPATIENT
Start: 2018-02-22 | End: 2018-05-07 | Stop reason: SDUPTHER

## 2018-02-22 RX ORDER — HYDROCODONE BITARTRATE AND ACETAMINOPHEN 5; 325 MG/1; MG/1
1 TABLET ORAL EVERY 8 HOURS PRN
Qty: 60 TABLET | Refills: 0 | Status: SHIPPED | OUTPATIENT
Start: 2018-02-22 | End: 2018-04-18 | Stop reason: SDUPTHER

## 2018-02-22 NOTE — PROGRESS NOTES
Subjective:      Patient ID: Rahul Rodriguez is a 65 y.o. female. HPI  Established patient here for a visit to manage chronic medical conditions as detailed below. Type 2 diabetes mellitus without complication ([facility] [address]  This problem was reviewed in detail. It is under control and stable. Will check blood work. Blood sugars have been  ok , will monitor closely and cover with sliding scale insulin coverage and continue current medications. BP Readings from Last 2 Encounters:   02/22/18 132/86   01/15/18 132/86     Hemoglobin A1C (%)   Date Value   11/21/2017 6.0     Microalbumin, Random Urine (mg/dL)   Date Value   11/21/2017 49.70 (H)     LDL Calculated (mg/dL)   Date Value   08/18/2017 64              Tobacco use:  Patient  reports that she has never smoked. She has never used smokeless tobacco.    If Smoker - Cessation materials given? NA    Is Daily aspirin on medication list?:  Yes    Diabetic retinal exam done? Yes   If yes, document in Health Maintenance. Monofilament placed on counter? Yes    Shoes and socks removed? Yes    BP taken with correct size cuff? Yes   Repeated if > 130/80 Yes     Microalbumin performed if applicable? Yes      Diabetic nephropathy associated with type 2 diabetes mellitus ([facility] [address]  This problem is stable, reviewed in detail, and advised patient to continue the current instructions or medications. Will continue to closely monitor the situation. On a ace-I or ARB. Essential hypertension  This is a chronic problem. The problem is well controlled. Patient monitors readings regularly. Pertinent negatives include no chest pain, focal sensory loss, focal weakness, leg pain, myalgias or shortness of breath. No headaches or chest pain. Takes medications regularly. Blood pressure has been stable, blood work was reviewed, and advised patient to continue the current instructions or medications. Mixed hyperlipidemia  This has been a chronic problem, takes meds regularly.  Monitors pain,dizziness, dyspnea, palpitations, or chest pain on exertion. No syncope. No nausea or vommitting or diarrhea. No jaundice or abdominal pain, change in bowel habits, black or bloody stools. No dysuria or hematuria or frequency of urination. No myalgias or muscle pain. No numbness, weakness, or tingling. No falls, or loss of consciousness. No weight loss or back pain. No falls. No paresthesias. No joint swelling or redness. No joint pain. No recent weight loss. No focal weakness or sensory deficits or paresthesias, No confusion or altered sensorium. No hematemesis. No hearing loss. No siezures. All other systems were reviewed, and review was negative. Objective:   Physical Exam  /86 (Site: Left Arm, Position: Sitting, Cuff Size: Medium Adult)   Resp 16   Ht 5' 2\" (1.575 m)   Wt 202 lb (91.6 kg)   BMI 36.95 kg/m²    The physical exam reveals a patient who appears well, alert and oriented x 3, pleasant, cooperative. Vitals are as noted. Head is atraumatic and normocephalic. Eyes reveal normal conjunctiva, cornea normal, pupils are equal and rective to light. Nasal mucosa is normal. Throat is normal without exudates. Ears reveal normal tympanic membranes. Neck is supple and free of adenopathy, or masses. No thyromegaly. No jugular venous distension. Lungs are clear to auscultation, no rales or rhonchi noted. Heart sounds are regular , no murmurs, clicks, gallops or rubs. Abdomen is soft, no tenderness, masses or organomegaly. Bowel sounds are normally heard. Pelvis: normal. Extremities are normal. Peripheral pulses are normal. Screening neurological exam is normal without focal findings. Cranial nerves are intact, reflexes are symmetrical and muscle strength eaqual. Skin is normal without suspicious lesions noted. Assessment:      Type 2 diabetes mellitus without complication ([address]  This problem was reviewed in detail. It is under control and stable. Will check blood work.    Blood sugars have

## 2018-02-22 NOTE — ASSESSMENT & PLAN NOTE
I counseled the patient for 10 minutes, on the importance of avoiding and trying to wean slowly off the pain medications. I explained to the patient the downside of continuing on the pain medications/benzodiazepines and their addictive and physical dependance properties. Patient expresses understanding. Controlled Substances Monitoring: The Prescription Monitoring Report for this patient was reviewed today. Ryan Baig MD)    Possible medication side effects, risk of tolerance/dependence & alternative treatments discussed., No signs of potential drug abuse or diversion identified. Ryan Baig MD)    Severe pain not adequately treated with lower dose. Ryan Baig MD)    Treatment objectives documented - patient is progressing appropriately. , Functional status reviewed - continues with improved or maintaining ADL's., Reestablished informed consent., Reviewed the patient's functional status and documentation. Ryan Baig MD)    Medication contract signed today.  Ryan Baig MD)

## 2018-03-02 ENCOUNTER — TELEPHONE (OUTPATIENT)
Dept: INTERNAL MEDICINE CLINIC | Age: 63
End: 2018-03-02

## 2018-03-02 NOTE — LETTER
[facility]ACT[facility]  DARIN[facility] Zimmerman [facility]  [address] [address]  Dept: Sangita Wang MD  3/2/2018      Karla Negro  [address] [facility], [address] [place] [address]          Dear Rafael Rangel,    [facility] records show that you are in need of a screening mammogram. The American Cancer Society's guidelines state that early detection of breast cancer improves the chances that breast cancer can be diagnosed at an early stage and treated successfully. Women age 36 and older should have a mammogram every year and should continue to do so for as long as they are in good health. To schedule a screening mammogram, you do not need an order from your doctor. You can call [address] [place] [address] ([phone]) to schedule a mammogram at your earliest convenience. If you already had a mammogram in the last 12 months, congratulations for taking this step toward good health! Please call our office to inform us of the date and location of your (most recent) mammogram. We also welcome you to reach out to us online using [employer]. Ask us how!     Sincerely,    Nick Farrell MD

## 2018-03-21 ENCOUNTER — TELEPHONE (OUTPATIENT)
Dept: INTERNAL MEDICINE CLINIC | Age: 63
End: 2018-03-21

## 2018-03-23 RX ORDER — POTASSIUM CHLORIDE 20 MEQ/1
TABLET, EXTENDED RELEASE ORAL
Qty: 30 TABLET | Refills: 5 | Status: SHIPPED | OUTPATIENT
Start: 2018-03-23 | End: 2018-11-10 | Stop reason: SDUPTHER

## 2018-04-18 ENCOUNTER — TELEPHONE (OUTPATIENT)
Dept: INTERNAL MEDICINE CLINIC | Age: 63
End: 2018-04-18

## 2018-04-18 DIAGNOSIS — G89.4 CHRONIC PAIN SYNDROME: Chronic | ICD-10-CM

## 2018-04-18 RX ORDER — HYDROCODONE BITARTRATE AND ACETAMINOPHEN 5; 325 MG/1; MG/1
1 TABLET ORAL EVERY 8 HOURS PRN
Qty: 60 TABLET | Refills: 0 | Status: SHIPPED | OUTPATIENT
Start: 2018-04-18 | End: 2018-05-29 | Stop reason: SDUPTHER

## 2018-04-30 ENCOUNTER — TELEPHONE (OUTPATIENT)
Dept: INTERNAL MEDICINE CLINIC | Age: 63
End: 2018-04-30

## 2018-05-04 ENCOUNTER — TELEPHONE (OUTPATIENT)
Dept: INTERNAL MEDICINE CLINIC | Age: 63
End: 2018-05-04

## 2018-05-04 NOTE — TELEPHONE ENCOUNTER
CLEO----Tianna Erickson Parvez called back. She said that Dr. Bronson Goins does not need to send a script for generic Xanax. Jann Subramanian is going to pay cash for the name brand med  that the doctor already sent in.

## 2018-05-07 DIAGNOSIS — G89.4 CHRONIC PAIN SYNDROME: Chronic | ICD-10-CM

## 2018-05-07 RX ORDER — ALPRAZOLAM 0.5 MG/1
TABLET ORAL
Qty: 60 TABLET | Refills: 2 | Status: SHIPPED | OUTPATIENT
Start: 2018-05-07 | End: 2018-06-04

## 2018-05-18 RX ORDER — LANCETS
EACH MISCELLANEOUS
Qty: 100 EACH | Refills: 3 | Status: SHIPPED | OUTPATIENT
Start: 2018-05-18 | End: 2019-02-13 | Stop reason: SDUPTHER

## 2018-05-29 ENCOUNTER — OFFICE VISIT (OUTPATIENT)
Dept: INTERNAL MEDICINE CLINIC | Age: 63
End: 2018-05-29

## 2018-05-29 VITALS
HEART RATE: 78 BPM | WEIGHT: 194 LBS | BODY MASS INDEX: 35.48 KG/M2 | DIASTOLIC BLOOD PRESSURE: 80 MMHG | OXYGEN SATURATION: 96 % | SYSTOLIC BLOOD PRESSURE: 132 MMHG

## 2018-05-29 DIAGNOSIS — E11.9 TYPE 2 DIABETES MELLITUS WITHOUT COMPLICATION, WITHOUT LONG-TERM CURRENT USE OF INSULIN (HCC): Primary | Chronic | ICD-10-CM

## 2018-05-29 DIAGNOSIS — I10 ESSENTIAL HYPERTENSION: Chronic | ICD-10-CM

## 2018-05-29 DIAGNOSIS — E78.2 MIXED HYPERLIPIDEMIA: Chronic | ICD-10-CM

## 2018-05-29 DIAGNOSIS — G89.4 CHRONIC PAIN SYNDROME: Chronic | ICD-10-CM

## 2018-05-29 LAB — HBA1C MFR BLD: 5.5 %

## 2018-05-29 PROCEDURE — 2022F DILAT RTA XM EVC RTNOPTHY: CPT | Performed by: INTERNAL MEDICINE

## 2018-05-29 PROCEDURE — 3044F HG A1C LEVEL LT 7.0%: CPT | Performed by: INTERNAL MEDICINE

## 2018-05-29 PROCEDURE — 99214 OFFICE O/P EST MOD 30 MIN: CPT | Performed by: INTERNAL MEDICINE

## 2018-05-29 PROCEDURE — G8417 CALC BMI ABV UP PARAM F/U: HCPCS | Performed by: INTERNAL MEDICINE

## 2018-05-29 PROCEDURE — 83036 HEMOGLOBIN GLYCOSYLATED A1C: CPT | Performed by: INTERNAL MEDICINE

## 2018-05-29 PROCEDURE — 1036F TOBACCO NON-USER: CPT | Performed by: INTERNAL MEDICINE

## 2018-05-29 PROCEDURE — G8599 NO ASA/ANTIPLAT THER USE RNG: HCPCS | Performed by: INTERNAL MEDICINE

## 2018-05-29 PROCEDURE — 3017F COLORECTAL CA SCREEN DOC REV: CPT | Performed by: INTERNAL MEDICINE

## 2018-05-29 PROCEDURE — G8427 DOCREV CUR MEDS BY ELIG CLIN: HCPCS | Performed by: INTERNAL MEDICINE

## 2018-05-29 RX ORDER — HYDROCODONE BITARTRATE AND ACETAMINOPHEN 5; 325 MG/1; MG/1
1 TABLET ORAL EVERY 8 HOURS PRN
Qty: 60 TABLET | Refills: 0 | Status: CANCELLED | OUTPATIENT
Start: 2018-05-29 | End: 2018-07-28

## 2018-05-29 RX ORDER — ESOMEPRAZOLE MAGNESIUM 40 MG/1
CAPSULE, DELAYED RELEASE ORAL
Qty: 30 CAPSULE | Refills: 5 | Status: CANCELLED | OUTPATIENT
Start: 2018-05-29

## 2018-05-29 RX ORDER — ESOMEPRAZOLE MAGNESIUM 40 MG/1
CAPSULE, DELAYED RELEASE ORAL
Qty: 30 CAPSULE | Refills: 5 | Status: SHIPPED | OUTPATIENT
Start: 2018-05-29 | End: 2018-12-03 | Stop reason: SDUPTHER

## 2018-05-29 RX ORDER — HYDROCODONE BITARTRATE AND ACETAMINOPHEN 5; 325 MG/1; MG/1
1 TABLET ORAL EVERY 8 HOURS PRN
Qty: 60 TABLET | Refills: 0 | Status: SHIPPED | OUTPATIENT
Start: 2018-05-29 | End: 2018-08-22 | Stop reason: SDUPTHER

## 2018-05-29 RX ORDER — BETAMETHASONE DIPROPIONATE 0.05 %
OINTMENT (GRAM) TOPICAL
Qty: 60 G | Refills: 3 | Status: SHIPPED | OUTPATIENT
Start: 2018-05-29

## 2018-08-20 ENCOUNTER — TELEPHONE (OUTPATIENT)
Dept: INTERNAL MEDICINE CLINIC | Age: 63
End: 2018-08-20

## 2018-08-20 DIAGNOSIS — G89.4 CHRONIC PAIN SYNDROME: Chronic | ICD-10-CM

## 2018-08-22 RX ORDER — HYDROCODONE BITARTRATE AND ACETAMINOPHEN 5; 325 MG/1; MG/1
1 TABLET ORAL EVERY 8 HOURS PRN
Qty: 60 TABLET | Refills: 0 | Status: SHIPPED | OUTPATIENT
Start: 2018-08-22 | End: 2018-10-16 | Stop reason: SDUPTHER

## 2018-08-23 RX ORDER — LISINOPRIL AND HYDROCHLOROTHIAZIDE 20; 12.5 MG/1; MG/1
TABLET ORAL
Qty: 30 TABLET | Refills: 5 | Status: SHIPPED | OUTPATIENT
Start: 2018-08-23 | End: 2018-11-05 | Stop reason: SDUPTHER

## 2018-08-30 ENCOUNTER — TELEPHONE (OUTPATIENT)
Dept: INTERNAL MEDICINE CLINIC | Age: 63
End: 2018-08-30

## 2018-08-30 ENCOUNTER — OFFICE VISIT (OUTPATIENT)
Dept: INTERNAL MEDICINE CLINIC | Age: 63
End: 2018-08-30

## 2018-08-30 VITALS
DIASTOLIC BLOOD PRESSURE: 85 MMHG | HEART RATE: 83 BPM | BODY MASS INDEX: 36.4 KG/M2 | OXYGEN SATURATION: 98 % | WEIGHT: 199 LBS | SYSTOLIC BLOOD PRESSURE: 132 MMHG

## 2018-08-30 DIAGNOSIS — I10 ESSENTIAL HYPERTENSION: Chronic | ICD-10-CM

## 2018-08-30 DIAGNOSIS — F41.9 ANXIETY: ICD-10-CM

## 2018-08-30 DIAGNOSIS — E11.9 TYPE 2 DIABETES MELLITUS WITHOUT COMPLICATION, WITHOUT LONG-TERM CURRENT USE OF INSULIN (HCC): Primary | Chronic | ICD-10-CM

## 2018-08-30 DIAGNOSIS — Z12.11 SCREENING FOR COLON CANCER: ICD-10-CM

## 2018-08-30 DIAGNOSIS — G89.4 CHRONIC PAIN SYNDROME: Chronic | ICD-10-CM

## 2018-08-30 DIAGNOSIS — F33.1 MODERATE EPISODE OF RECURRENT MAJOR DEPRESSIVE DISORDER (HCC): Chronic | ICD-10-CM

## 2018-08-30 DIAGNOSIS — R53.1 WEAKNESS: Primary | ICD-10-CM

## 2018-08-30 DIAGNOSIS — E78.2 MIXED HYPERLIPIDEMIA: Chronic | ICD-10-CM

## 2018-08-30 DIAGNOSIS — E11.21 DIABETIC NEPHROPATHY ASSOCIATED WITH TYPE 2 DIABETES MELLITUS (HCC): Chronic | ICD-10-CM

## 2018-08-30 LAB
A/G RATIO: 1.5 (ref 1.1–2.2)
ALBUMIN SERPL-MCNC: 4.4 G/DL (ref 3.4–5)
ALP BLD-CCNC: 60 U/L (ref 40–129)
ALT SERPL-CCNC: 22 U/L (ref 10–40)
ANION GAP SERPL CALCULATED.3IONS-SCNC: 14 MMOL/L (ref 3–16)
AST SERPL-CCNC: 19 U/L (ref 15–37)
BASOPHILS ABSOLUTE: 0.1 K/UL (ref 0–0.2)
BASOPHILS RELATIVE PERCENT: 0.8 %
BILIRUB SERPL-MCNC: 0.5 MG/DL (ref 0–1)
BUN BLDV-MCNC: 15 MG/DL (ref 7–20)
CALCIUM SERPL-MCNC: 9.5 MG/DL (ref 8.3–10.6)
CHLORIDE BLD-SCNC: 103 MMOL/L (ref 99–110)
CHOLESTEROL, TOTAL: 171 MG/DL (ref 0–199)
CO2: 25 MMOL/L (ref 21–32)
CREAT SERPL-MCNC: 1 MG/DL (ref 0.6–1.2)
EOSINOPHILS ABSOLUTE: 0.1 K/UL (ref 0–0.6)
EOSINOPHILS RELATIVE PERCENT: 0.7 %
GFR AFRICAN AMERICAN: >60
GFR NON-AFRICAN AMERICAN: 56
GLOBULIN: 2.9 G/DL
GLUCOSE BLD-MCNC: 74 MG/DL (ref 70–99)
HBA1C MFR BLD: 5.9 %
HCT VFR BLD CALC: 39.5 % (ref 36–48)
HDLC SERPL-MCNC: 51 MG/DL (ref 40–60)
HEMOGLOBIN: 13.2 G/DL (ref 12–16)
LDL CHOLESTEROL CALCULATED: 98 MG/DL
LYMPHOCYTES ABSOLUTE: 1.8 K/UL (ref 1–5.1)
LYMPHOCYTES RELATIVE PERCENT: 16.6 %
MCH RBC QN AUTO: 27.9 PG (ref 26–34)
MCHC RBC AUTO-ENTMCNC: 33.3 G/DL (ref 31–36)
MCV RBC AUTO: 83.8 FL (ref 80–100)
MONOCYTES ABSOLUTE: 0.5 K/UL (ref 0–1.3)
MONOCYTES RELATIVE PERCENT: 4.6 %
NEUTROPHILS ABSOLUTE: 8.6 K/UL (ref 1.7–7.7)
NEUTROPHILS RELATIVE PERCENT: 77.3 %
PDW BLD-RTO: 13.2 % (ref 12.4–15.4)
PLATELET # BLD: 290 K/UL (ref 135–450)
PMV BLD AUTO: 9.5 FL (ref 5–10.5)
POTASSIUM SERPL-SCNC: 3.7 MMOL/L (ref 3.5–5.1)
RBC # BLD: 4.72 M/UL (ref 4–5.2)
SODIUM BLD-SCNC: 142 MMOL/L (ref 136–145)
TOTAL PROTEIN: 7.3 G/DL (ref 6.4–8.2)
TRIGL SERPL-MCNC: 111 MG/DL (ref 0–150)
TSH SERPL DL<=0.05 MIU/L-ACNC: 3.09 UIU/ML (ref 0.27–4.2)
VLDLC SERPL CALC-MCNC: 22 MG/DL
WBC # BLD: 11.1 K/UL (ref 4–11)

## 2018-08-30 PROCEDURE — 99214 OFFICE O/P EST MOD 30 MIN: CPT | Performed by: INTERNAL MEDICINE

## 2018-08-30 PROCEDURE — G8417 CALC BMI ABV UP PARAM F/U: HCPCS | Performed by: INTERNAL MEDICINE

## 2018-08-30 PROCEDURE — 3017F COLORECTAL CA SCREEN DOC REV: CPT | Performed by: INTERNAL MEDICINE

## 2018-08-30 PROCEDURE — 83036 HEMOGLOBIN GLYCOSYLATED A1C: CPT | Performed by: INTERNAL MEDICINE

## 2018-08-30 PROCEDURE — 36415 COLL VENOUS BLD VENIPUNCTURE: CPT | Performed by: INTERNAL MEDICINE

## 2018-08-30 PROCEDURE — G8427 DOCREV CUR MEDS BY ELIG CLIN: HCPCS | Performed by: INTERNAL MEDICINE

## 2018-08-30 PROCEDURE — 1036F TOBACCO NON-USER: CPT | Performed by: INTERNAL MEDICINE

## 2018-08-30 PROCEDURE — 3044F HG A1C LEVEL LT 7.0%: CPT | Performed by: INTERNAL MEDICINE

## 2018-08-30 PROCEDURE — G8599 NO ASA/ANTIPLAT THER USE RNG: HCPCS | Performed by: INTERNAL MEDICINE

## 2018-08-30 PROCEDURE — 2022F DILAT RTA XM EVC RTNOPTHY: CPT | Performed by: INTERNAL MEDICINE

## 2018-08-30 NOTE — PROGRESS NOTES
BP Readings from Last 2 Encounters:   05/29/18 132/80   02/22/18 132/86     Hemoglobin A1C (%)   Date Value   05/29/2018 5.5     Microalbumin, Random Urine (mg/dL)   Date Value   11/21/2017 49.70 (H)     LDL Calculated (mg/dL)   Date Value   02/22/2018 78              Tobacco use:  Patient  reports that she has never smoked. She has never used smokeless tobacco.    If Smoker - Cessation materials given? NA    Is Daily aspirin on medication list?:  Yes    Diabetic retinal exam done? Yes   If yes, document in Health Maintenance. Monofilament placed on counter? Yes    Shoes and socks removed? Yes    BP taken with correct size cuff? Yes   Repeated if > 130/80 Yes     Microalbumin performed if applicable?   Yes

## 2018-08-30 NOTE — PROGRESS NOTES
cuff? Yes              Repeated if > 130/80 Yes      Microalbumin performed if applicable? Yes       Chronic pain syndrome  I counseled the patient for 10 minutes, on the importance of avoiding and trying to wean slowly off the pain medications. I explained to the patient the downside of continuing on the pain medications/benzodiazepines and their addictive and physical dependance properties. Patient expresses understanding. Controlled Substances Monitoring:      The Prescription Monitoring Report for this patient was reviewed today. Nisreen Guzman MD)     No signs of potential drug abuse or diversion identified. , Possible medication side effects, risk of tolerance/dependence & alternative treatments discussed. Nisreen Guzman MD)     Severe pain not adequately treated with lower dose. Nisreen Guzman MD)     Dose reduction has been attempted., Treatment objectives documented - patient is progressing appropriately. , Functional status reviewed - continues with improved or maintaining ADL's. Nisreen Guzman MD)     Existing medication contract. Nisreen Guzman MD)        Review of Systems  ROS: No unusual headaches or allergy symptoms or blurred vision. No prolonged cough. No flushing or facial pain or chest pain,dizziness, dyspnea, palpitations, or chest pain on exertion. No syncope. No nausea or vommitting or diarrhea. No jaundice or abdominal pain, change in bowel habits, black or bloody stools. No dysuria or hematuria or frequency of urination. No myalgias or muscle pain. No numbness, weakness, or tingling. No falls, or loss of consciousness. No weight loss or back pain. No falls. No paresthesias. No joint swelling or redness. No joint pain. No recent weight loss. No focal weakness or sensory deficits or paresthesias, No confusion or altered sensorium. No hematemesis. No hearing loss. No siezures. All other systems were reviewed, and review was negative.    Objective:   Physical Functional status reviewed - continues with improved or maintaining ADL's. Dina Weaver MD)     Existing medication contract. Dina Weaver MD)                      Plan: This problem was reviewed in detail. It is under control and stable. Will check blood work.

## 2018-08-30 NOTE — LETTER
and sex drive. They may cause problems with breathing, sleep apnea and reduced coughing, which are especially dangerous for patients with lung disease. Overdose or dangerous interactions with alcohol and other medications may occur, leading to death. Hyperalgesia may develop, in which patients receiving opioids for the treatment of pain may actually become more sensitive to certain painful stimuli, and in some cases, experience pain from ordinarily non-painful stimuli. Women between the ages of 14-53 who could become pregnant should carefully weigh the risks and benefits of opioids with their physicians, as these medications increase the risk of pregnancy complications, including miscarriage,  delivery and stillbirth. It is also possible for babies to be born addicted to opioids. Opioid dependence withdrawal symptoms may include; feelings of uneasiness, increased pain, irritability, belly pain, diarrhea, sweats and goose-flesh. Benzodiazepines and non-benzodiazepine sleep medications: These medications can lead to problems such as addiction/dependence, sedation, fatigue, lightheadedness, dizziness, incoordination, falls, depression, hallucinations, and impaired judgment, memory and concentration. The ability to drive and operate machinery may also be affected. Abnormal sleep-related behaviors have been reported, including sleep walking, driving, making telephone calls, eating, or having sex while not fully awake. These medications can suppress breathing and worsen sleep apnea, particularly when combined with alcohol or other sedating medications, potentially leading to death. Dependence withdrawal symptoms may include tremors, anxiety, hallucinations and seizures. Stimulants:  Common adverse effects include addiction/dependence, increased blood pressure and heart rate, decreased appetite, nausea, involuntary weight loss, insomnia, irritability, and headaches.   These

## 2018-09-01 RX ORDER — SYRINGE-NEEDLE,INSULIN,0.5 ML 31 GX5/16"
SYRINGE, EMPTY DISPOSABLE MISCELLANEOUS
Qty: 100 EACH | Refills: 4 | Status: SHIPPED | OUTPATIENT
Start: 2018-09-01 | End: 2019-03-01

## 2018-09-01 RX ORDER — INSULIN GLARGINE 100 [IU]/ML
INJECTION, SOLUTION SUBCUTANEOUS
Qty: 30 VIAL | Refills: 4 | Status: SHIPPED | OUTPATIENT
Start: 2018-09-01 | End: 2019-03-12 | Stop reason: SDUPTHER

## 2018-09-25 RX ORDER — ATORVASTATIN CALCIUM 20 MG/1
TABLET, FILM COATED ORAL
Qty: 90 TABLET | Refills: 3 | Status: SHIPPED | OUTPATIENT
Start: 2018-09-25 | End: 2019-10-02 | Stop reason: SDUPTHER

## 2018-09-27 ENCOUNTER — TELEPHONE (OUTPATIENT)
Dept: INTERNAL MEDICINE CLINIC | Age: 63
End: 2018-09-27

## 2018-09-27 DIAGNOSIS — G89.4 CHRONIC PAIN SYNDROME: Primary | Chronic | ICD-10-CM

## 2018-10-16 ENCOUNTER — TELEPHONE (OUTPATIENT)
Dept: INTERNAL MEDICINE CLINIC | Age: 63
End: 2018-10-16

## 2018-10-16 DIAGNOSIS — G89.4 CHRONIC PAIN SYNDROME: Chronic | ICD-10-CM

## 2018-10-16 RX ORDER — HYDROCODONE BITARTRATE AND ACETAMINOPHEN 5; 325 MG/1; MG/1
1 TABLET ORAL EVERY 8 HOURS PRN
Qty: 60 TABLET | Refills: 0 | Status: SHIPPED | OUTPATIENT
Start: 2018-10-16 | End: 2018-12-03 | Stop reason: SDUPTHER

## 2018-11-05 RX ORDER — LISINOPRIL AND HYDROCHLOROTHIAZIDE 20; 12.5 MG/1; MG/1
1 TABLET ORAL DAILY
Qty: 90 TABLET | Refills: 1 | Status: SHIPPED | OUTPATIENT
Start: 2018-11-05 | End: 2019-08-28 | Stop reason: SDUPTHER

## 2018-11-12 RX ORDER — POTASSIUM CHLORIDE 20 MEQ/1
TABLET, EXTENDED RELEASE ORAL
Qty: 90 TABLET | Refills: 3 | Status: SHIPPED | OUTPATIENT
Start: 2018-11-12 | End: 2019-11-14 | Stop reason: SDUPTHER

## 2018-12-03 ENCOUNTER — OFFICE VISIT (OUTPATIENT)
Dept: INTERNAL MEDICINE CLINIC | Age: 63
End: 2018-12-03
Payer: MEDICARE

## 2018-12-03 VITALS
HEART RATE: 80 BPM | HEIGHT: 62 IN | DIASTOLIC BLOOD PRESSURE: 70 MMHG | RESPIRATION RATE: 16 BRPM | SYSTOLIC BLOOD PRESSURE: 134 MMHG | WEIGHT: 209 LBS | BODY MASS INDEX: 38.46 KG/M2 | OXYGEN SATURATION: 96 %

## 2018-12-03 DIAGNOSIS — E11.9 TYPE 2 DIABETES MELLITUS WITHOUT COMPLICATION, WITHOUT LONG-TERM CURRENT USE OF INSULIN (HCC): Primary | Chronic | ICD-10-CM

## 2018-12-03 DIAGNOSIS — E78.2 MIXED HYPERLIPIDEMIA: Chronic | ICD-10-CM

## 2018-12-03 DIAGNOSIS — I10 ESSENTIAL HYPERTENSION: Chronic | ICD-10-CM

## 2018-12-03 DIAGNOSIS — F33.1 MODERATE EPISODE OF RECURRENT MAJOR DEPRESSIVE DISORDER (HCC): Chronic | ICD-10-CM

## 2018-12-03 DIAGNOSIS — Z23 NEED FOR INFLUENZA VACCINATION: ICD-10-CM

## 2018-12-03 DIAGNOSIS — G89.4 CHRONIC PAIN SYNDROME: Chronic | ICD-10-CM

## 2018-12-03 LAB — HBA1C MFR BLD: 5.7 %

## 2018-12-03 PROCEDURE — G0008 ADMIN INFLUENZA VIRUS VAC: HCPCS | Performed by: INTERNAL MEDICINE

## 2018-12-03 PROCEDURE — 83036 HEMOGLOBIN GLYCOSYLATED A1C: CPT | Performed by: INTERNAL MEDICINE

## 2018-12-03 PROCEDURE — 90686 IIV4 VACC NO PRSV 0.5 ML IM: CPT | Performed by: INTERNAL MEDICINE

## 2018-12-03 PROCEDURE — 99214 OFFICE O/P EST MOD 30 MIN: CPT | Performed by: INTERNAL MEDICINE

## 2018-12-03 RX ORDER — ESOMEPRAZOLE MAGNESIUM 40 MG/1
CAPSULE, DELAYED RELEASE ORAL
Qty: 30 CAPSULE | Refills: 5 | Status: SHIPPED | OUTPATIENT
Start: 2018-12-03 | End: 2019-07-04 | Stop reason: SDUPTHER

## 2018-12-03 RX ORDER — FUROSEMIDE 40 MG/1
TABLET ORAL
Qty: 90 TABLET | Refills: 3 | Status: SHIPPED | OUTPATIENT
Start: 2018-12-03 | End: 2020-01-27

## 2018-12-03 RX ORDER — HYDROCODONE BITARTRATE AND ACETAMINOPHEN 5; 325 MG/1; MG/1
1 TABLET ORAL EVERY 8 HOURS PRN
Qty: 60 TABLET | Refills: 0 | Status: SHIPPED | OUTPATIENT
Start: 2018-12-03 | End: 2018-12-18 | Stop reason: SDUPTHER

## 2018-12-03 NOTE — PROGRESS NOTES
Vaccine Information Sheet, \"Influenza - Inactivated\"  given to Miranda Cutler, or parent/legal guardian of  Miranda Cutler and verbalized understanding. Patient responses:    Have you ever had a reaction to a flu vaccine? No  Are you able to eat eggs without adverse effects? Yes  Do you have any current illness? No  Have you ever had Guillian [place] Syndrome? No    Flu vaccine given per order. Please see immunization tab.
continuing on the pain medications/benzodiazepines and their addictive and physical dependance properties. Patient expresses understanding. Controlled Substances Monitoring:      The Prescription Monitoring Report for this patient was reviewed today. Benitez Ge MD)     No signs of potential drug abuse or diversion identified. , Possible medication side effects, risk of tolerance/dependence & alternative treatments discussed. Benitez Ge MD)     Severe pain not adequately treated with lower dose. Benitez Ge MD)     Dose reduction has been attempted., Treatment objectives documented - patient is progressing appropriately. , Functional status reviewed - continues with improved or maintaining ADL's. Benitez Ge MD)     Existing medication contract. Benitze Ge MD)           Plan: This problem was reviewed in detail. It is under control and stable.  Will check blood work.

## 2018-12-17 ENCOUNTER — TELEPHONE (OUTPATIENT)
Dept: INTERNAL MEDICINE CLINIC | Age: 63
End: 2018-12-17

## 2018-12-17 DIAGNOSIS — G89.4 CHRONIC PAIN SYNDROME: Chronic | ICD-10-CM

## 2018-12-18 RX ORDER — HYDROCODONE BITARTRATE AND ACETAMINOPHEN 5; 325 MG/1; MG/1
1 TABLET ORAL EVERY 8 HOURS PRN
Qty: 60 TABLET | Refills: 0 | Status: SHIPPED | OUTPATIENT
Start: 2018-12-18 | End: 2019-01-29 | Stop reason: SDUPTHER

## 2019-01-22 ENCOUNTER — TELEPHONE (OUTPATIENT)
Dept: INTERNAL MEDICINE CLINIC | Age: 64
End: 2019-01-22

## 2019-01-22 RX ORDER — BLOOD SUGAR DIAGNOSTIC
STRIP MISCELLANEOUS
Qty: 90 EACH | Refills: 5 | Status: SHIPPED | OUTPATIENT
Start: 2019-01-22 | End: 2020-02-26

## 2019-01-23 ENCOUNTER — TELEPHONE (OUTPATIENT)
Dept: INTERNAL MEDICINE CLINIC | Age: 64
End: 2019-01-23

## 2019-01-28 ENCOUNTER — TELEPHONE (OUTPATIENT)
Dept: INTERNAL MEDICINE CLINIC | Age: 64
End: 2019-01-28

## 2019-01-29 ENCOUNTER — TELEPHONE (OUTPATIENT)
Dept: INTERNAL MEDICINE CLINIC | Age: 64
End: 2019-01-29

## 2019-01-29 DIAGNOSIS — G89.4 CHRONIC PAIN SYNDROME: Chronic | ICD-10-CM

## 2019-01-29 RX ORDER — HYDROCODONE BITARTRATE AND ACETAMINOPHEN 5; 325 MG/1; MG/1
1 TABLET ORAL EVERY 8 HOURS PRN
Qty: 60 TABLET | Refills: 0 | Status: SHIPPED | OUTPATIENT
Start: 2019-01-29 | End: 2019-09-05

## 2019-02-14 RX ORDER — LANCETS
EACH MISCELLANEOUS
Qty: 100 EACH | Refills: 3 | Status: SHIPPED | OUTPATIENT
Start: 2019-02-14 | End: 2019-11-27 | Stop reason: SDUPTHER

## 2019-03-01 ENCOUNTER — TELEPHONE (OUTPATIENT)
Dept: PAIN MANAGEMENT | Age: 64
End: 2019-03-01

## 2019-03-01 ENCOUNTER — OFFICE VISIT (OUTPATIENT)
Dept: INTERNAL MEDICINE CLINIC | Age: 64
End: 2019-03-01
Payer: MEDICARE

## 2019-03-01 VITALS
DIASTOLIC BLOOD PRESSURE: 88 MMHG | RESPIRATION RATE: 14 BRPM | HEART RATE: 87 BPM | SYSTOLIC BLOOD PRESSURE: 152 MMHG | TEMPERATURE: 98.3 F | BODY MASS INDEX: 38.46 KG/M2 | HEIGHT: 62 IN | OXYGEN SATURATION: 98 % | WEIGHT: 209 LBS

## 2019-03-01 DIAGNOSIS — I10 ESSENTIAL HYPERTENSION: Chronic | ICD-10-CM

## 2019-03-01 DIAGNOSIS — E11.9 TYPE 2 DIABETES MELLITUS WITHOUT COMPLICATION, WITHOUT LONG-TERM CURRENT USE OF INSULIN (HCC): Primary | Chronic | ICD-10-CM

## 2019-03-01 DIAGNOSIS — F41.9 ANXIETY: ICD-10-CM

## 2019-03-01 DIAGNOSIS — Z12.39 BREAST CANCER SCREENING: ICD-10-CM

## 2019-03-01 DIAGNOSIS — E78.2 MIXED HYPERLIPIDEMIA: Chronic | ICD-10-CM

## 2019-03-01 DIAGNOSIS — F33.1 MODERATE EPISODE OF RECURRENT MAJOR DEPRESSIVE DISORDER (HCC): Chronic | ICD-10-CM

## 2019-03-01 DIAGNOSIS — G89.4 CHRONIC PAIN SYNDROME: Chronic | ICD-10-CM

## 2019-03-01 LAB
A/G RATIO: 1.4 (ref 1.1–2.2)
ALBUMIN SERPL-MCNC: 4.2 G/DL (ref 3.4–5)
ALP BLD-CCNC: 53 U/L (ref 40–129)
ALT SERPL-CCNC: 19 U/L (ref 10–40)
ANION GAP SERPL CALCULATED.3IONS-SCNC: 15 MMOL/L (ref 3–16)
AST SERPL-CCNC: 17 U/L (ref 15–37)
BILIRUB SERPL-MCNC: 0.5 MG/DL (ref 0–1)
BUN BLDV-MCNC: 13 MG/DL (ref 7–20)
CALCIUM SERPL-MCNC: 9.4 MG/DL (ref 8.3–10.6)
CHLORIDE BLD-SCNC: 99 MMOL/L (ref 99–110)
CHOLESTEROL, TOTAL: 161 MG/DL (ref 0–199)
CO2: 24 MMOL/L (ref 21–32)
CREAT SERPL-MCNC: 0.9 MG/DL (ref 0.6–1.2)
CREATININE URINE: 76.5 MG/DL (ref 28–259)
GFR AFRICAN AMERICAN: >60
GFR NON-AFRICAN AMERICAN: >60
GLOBULIN: 2.9 G/DL
GLUCOSE BLD-MCNC: 108 MG/DL (ref 70–99)
HCT VFR BLD CALC: 39.6 % (ref 36–48)
HDLC SERPL-MCNC: 54 MG/DL (ref 40–60)
HEMOGLOBIN: 12.8 G/DL (ref 12–16)
LDL CHOLESTEROL CALCULATED: 86 MG/DL
MCH RBC QN AUTO: 27.9 PG (ref 26–34)
MCHC RBC AUTO-ENTMCNC: 32.3 G/DL (ref 31–36)
MCV RBC AUTO: 86.4 FL (ref 80–100)
MICROALBUMIN UR-MCNC: 131 MG/DL
MICROALBUMIN/CREAT UR-RTO: 1712.4 MG/G (ref 0–30)
PDW BLD-RTO: 13.9 % (ref 12.4–15.4)
PLATELET # BLD: 285 K/UL (ref 135–450)
PMV BLD AUTO: 9.3 FL (ref 5–10.5)
POTASSIUM SERPL-SCNC: 3.9 MMOL/L (ref 3.5–5.1)
RBC # BLD: 4.58 M/UL (ref 4–5.2)
SODIUM BLD-SCNC: 138 MMOL/L (ref 136–145)
TOTAL PROTEIN: 7.1 G/DL (ref 6.4–8.2)
TRIGL SERPL-MCNC: 105 MG/DL (ref 0–150)
VLDLC SERPL CALC-MCNC: 21 MG/DL
WBC # BLD: 11.7 K/UL (ref 4–11)

## 2019-03-01 PROCEDURE — 36415 COLL VENOUS BLD VENIPUNCTURE: CPT | Performed by: NURSE PRACTITIONER

## 2019-03-01 PROCEDURE — 2022F DILAT RTA XM EVC RTNOPTHY: CPT | Performed by: NURSE PRACTITIONER

## 2019-03-01 PROCEDURE — 99205 OFFICE O/P NEW HI 60 MIN: CPT | Performed by: NURSE PRACTITIONER

## 2019-03-01 PROCEDURE — G8482 FLU IMMUNIZE ORDER/ADMIN: HCPCS | Performed by: NURSE PRACTITIONER

## 2019-03-01 PROCEDURE — G8599 NO ASA/ANTIPLAT THER USE RNG: HCPCS | Performed by: NURSE PRACTITIONER

## 2019-03-01 PROCEDURE — 3017F COLORECTAL CA SCREEN DOC REV: CPT | Performed by: NURSE PRACTITIONER

## 2019-03-01 PROCEDURE — 1036F TOBACCO NON-USER: CPT | Performed by: NURSE PRACTITIONER

## 2019-03-01 PROCEDURE — G8427 DOCREV CUR MEDS BY ELIG CLIN: HCPCS | Performed by: NURSE PRACTITIONER

## 2019-03-01 PROCEDURE — G8417 CALC BMI ABV UP PARAM F/U: HCPCS | Performed by: NURSE PRACTITIONER

## 2019-03-01 PROCEDURE — 3046F HEMOGLOBIN A1C LEVEL >9.0%: CPT | Performed by: NURSE PRACTITIONER

## 2019-03-01 RX ORDER — ALPRAZOLAM 0.5 MG/1
1 TABLET ORAL
COMMUNITY
End: 2019-03-01 | Stop reason: SDUPTHER

## 2019-03-01 RX ORDER — ALPRAZOLAM 0.5 MG/1
1 TABLET ORAL DAILY PRN
Qty: 60 TABLET | Refills: 0 | Status: SHIPPED | OUTPATIENT
Start: 2019-03-01 | End: 2019-03-31

## 2019-03-01 ASSESSMENT — PATIENT HEALTH QUESTIONNAIRE - PHQ9
SUM OF ALL RESPONSES TO PHQ QUESTIONS 1-9: 1
1. LITTLE INTEREST OR PLEASURE IN DOING THINGS: 0
SUM OF ALL RESPONSES TO PHQ9 QUESTIONS 1 & 2: 1
2. FEELING DOWN, DEPRESSED OR HOPELESS: 1
SUM OF ALL RESPONSES TO PHQ QUESTIONS 1-9: 1

## 2019-03-01 ASSESSMENT — ENCOUNTER SYMPTOMS
ORTHOPNEA: 0
COLOR CHANGE: 0
SINUS PRESSURE: 0
CHEST TIGHTNESS: 0
BLURRED VISION: 0
SHORTNESS OF BREATH: 0
ABDOMINAL PAIN: 0
SINUS PAIN: 0
COUGH: 0
BACK PAIN: 0
VISUAL CHANGE: 0
RHINORRHEA: 0
CHOKING: 0
ANAL BLEEDING: 0
ABDOMINAL DISTENTION: 0

## 2019-03-01 NOTE — LETTER
03/01/19    [facility]   Dr. Rafal Page, [address] [facility]   P: [phone]  F: [phone]    Re: Xenia Gonsales 1955    To Whom it May Concern: Thank you for your referral to [facility] ([facility] Management. Regrettably, we did not receive all the necessary information to determine if this patient can be accepted into our office. The diagnosis attached to the referral only states \"chronic pain syndrome\" and this, unfortunately, does not alert us to what is causing her pain or the area the pain is in and There is no site specific imaging available for the reported body part causing pain. I am sending this message to advise your office of what is needed for her to be considered as a new patient with us. Please know that the information requested is reviewed by our providers and must be accepted by one of them prior to scheduling any appointments. It is necessary to determine the need for pain management/narcotics due to stricter laws and regulations set in place by the state of [place], and all patients are required to provide this information without exception. For physician review and follow up, please include ALL the following:     · Referral that includes diagnosis codes showing what is causing her pain  · Site specific imaging (cannot be more than 11years old & must match referral diagnosis. Fibro/Neuropathy will require EMG)    The requested information should be faxed to [phone] or you may call us at [phone] to let us know that new information is available. Please be aware that imaging is required for every referral. If none is available, new imaging must be ordered by the referring physician. Without all the above information, the referral will be considered incomplete and cannot be submitted for review. We will call the patient to schedule an appointment once the referral has been received and accepted. Unfortunately, our office is not able to accommodate any expedited referral or scheduling requests. If no new information is received within 30 days from this request, the referral will be canceled and will need to be resubmitted with the information requested above to be considered for new patient scheduling.      Thank you,     Pain Management Staff

## 2019-03-01 NOTE — TELEPHONE ENCOUNTER
We received a referral for this patient, but information was missing. We need a diagnosis code that alerts us to what is causing the pain or the area the pain is in and imaging to complete her referral. A letter was faxed to the referring provider to request additional information. If nothing is received within 30 days from todays date, the referral will be canceled. Please do not close this encounter. This call is used by myself in order to keep track of open referral requests. Thank you.

## 2019-03-02 LAB
ESTIMATED AVERAGE GLUCOSE: 125.5 MG/DL
HBA1C MFR BLD: 6 %

## 2019-03-06 DIAGNOSIS — R80.9 CONTROLLED TYPE 2 DIABETES MELLITUS WITH MICROALBUMINURIA, UNSPECIFIED WHETHER LONG TERM INSULIN USE (HCC): Primary | ICD-10-CM

## 2019-03-06 DIAGNOSIS — E11.29 CONTROLLED TYPE 2 DIABETES MELLITUS WITH MICROALBUMINURIA, UNSPECIFIED WHETHER LONG TERM INSULIN USE (HCC): Primary | ICD-10-CM

## 2019-03-06 LAB
6-ACETYLMORPHINE: NOT DETECTED
7-AMINOCLONAZEPAM: NOT DETECTED
ALPHA-OH-ALPRAZOLAM: PRESENT
ALPRAZOLAM: NOT DETECTED
AMPHETAMINE: NOT DETECTED
BARBITURATES: NOT DETECTED
BENZOYLECGONINE: NOT DETECTED
BUPRENORPHINE: NOT DETECTED
CARISOPRODOL: NOT DETECTED
CLONAZEPAM: NOT DETECTED
CODEINE: NOT DETECTED
CREATININE URINE: 76.1 MG/DL (ref 20–400)
DIAZEPAM: NOT DETECTED
DRUGS EXPECTED: NORMAL
EER PAIN MGT DRUG PANEL, HIGH RES/EMIT U: NORMAL
ETHYL GLUCURONIDE: NOT DETECTED
FENTANYL: NOT DETECTED
HYDROCODONE: PRESENT
HYDROMORPHONE: NOT DETECTED
LORAZEPAM: NOT DETECTED
MARIJUANA METABOLITE: NOT DETECTED
MDA: NOT DETECTED
MDEA: NOT DETECTED
MDMA URINE: NOT DETECTED
MEPERIDINE: NOT DETECTED
METHADONE: NOT DETECTED
METHAMPHETAMINE: NOT DETECTED
METHYLPHENIDATE: NOT DETECTED
MIDAZOLAM: NOT DETECTED
MORPHINE: NOT DETECTED
NORBUPRENORPHINE, FREE: NOT DETECTED
NORDIAZEPAM: NOT DETECTED
NORFENTANYL: NOT DETECTED
NORHYDROCODONE, URINE: PRESENT
NOROXYCODONE: NOT DETECTED
NOROXYMORPHONE, URINE: NOT DETECTED
OXAZEPAM: NOT DETECTED
OXYCODONE: NOT DETECTED
OXYMORPHONE: NOT DETECTED
PAIN MANAGEMENT DRUG PANEL: NORMAL
PAIN MANAGEMENT DRUG PANEL: NORMAL
PCP: NOT DETECTED
PHENTERMINE: NOT DETECTED
PROPOXYPHENE: NOT DETECTED
TAPENTADOL, URINE: NOT DETECTED
TAPENTADOL-O-SULFATE, URINE: NOT DETECTED
TEMAZEPAM: NOT DETECTED
TRAMADOL: NOT DETECTED
ZOLPIDEM: NOT DETECTED

## 2019-03-12 RX ORDER — INSULIN GLARGINE 100 [IU]/ML
INJECTION, SOLUTION SUBCUTANEOUS
Qty: 1 VIAL | Refills: 5 | Status: SHIPPED | OUTPATIENT
Start: 2019-03-12 | End: 2019-06-09 | Stop reason: SDUPTHER

## 2019-04-09 ENCOUNTER — TELEPHONE (OUTPATIENT)
Dept: INTERNAL MEDICINE CLINIC | Age: 64
End: 2019-04-09

## 2019-04-09 NOTE — TELEPHONE ENCOUNTER
Pt needs an Rx for Hydrocodone-Apap 5-325 mg,     Call [facility] when this is ready.

## 2019-04-15 ENCOUNTER — TELEPHONE (OUTPATIENT)
Dept: INTERNAL MEDICINE CLINIC | Age: 64
End: 2019-04-15

## 2019-04-23 ENCOUNTER — TELEPHONE (OUTPATIENT)
Dept: INTERNAL MEDICINE CLINIC | Age: 64
End: 2019-04-23

## 2019-04-23 NOTE — TELEPHONE ENCOUNTER
Please refax recent labs to [facility].  Fax today please to [phone], Attn: Roberto Cramer

## 2019-04-30 ENCOUNTER — TELEPHONE (OUTPATIENT)
Dept: INTERNAL MEDICINE CLINIC | Age: 64
End: 2019-04-30

## 2019-04-30 NOTE — TELEPHONE ENCOUNTER
Please have patient call renal doctor and get accurate instructions on medications.  Then have patient make f/u appointment with me to discuss, thank you

## 2019-04-30 NOTE — TELEPHONE ENCOUNTER
Heath's sister Romero Bates called. She said they faxed Heath's lab results to Dr. Eliazar Au (kidney doc). Dr. Eliazar Au said to tell Zenobia Wilson to increase Ángel No' BP med and to prescribe a medication but she wasn't sure of the name of it.        Any questions call Nando Marino @ Dr. Eliazar Au ([facility]) phone [phone]

## 2019-05-01 NOTE — TELEPHONE ENCOUNTER
Please have patient make appointment to discuss this, or he can f/u in June. He is not a patient of Dr. Yuan Cueva, therefore recommendations cant be documented to reflect treatment.  I will be happy to discuss this with the patient when I see them in follow up, thank you

## 2019-05-01 NOTE — TELEPHONE ENCOUNTER
Called the office of Dr. Jocelyn Montaño. This patient is not a patient of his. He has been looking at her labs just as a favor to her sister, Deshawn Martinez. I spoke with Domo Avina and she is going to ask Dr. Jocelyn Montaño which medication he's talking about and call us back after 1 pm today.

## 2019-05-16 RX ORDER — PEN NEEDLE, DIABETIC 29 G X1/2"
NEEDLE, DISPOSABLE MISCELLANEOUS
Qty: 100 EACH | Refills: 3 | Status: SHIPPED | OUTPATIENT
Start: 2019-05-16 | End: 2019-12-08 | Stop reason: SDUPTHER

## 2019-06-23 NOTE — TELEPHONE ENCOUNTER
I am not sure exactly as to what she has, so I will have to guess. We can call in keflex 500 mg po tid for 7 days, but let her know, if this does not work, she will need to be seen. Yes

## 2019-07-05 RX ORDER — ESOMEPRAZOLE MAGNESIUM 40 MG/1
CAPSULE, DELAYED RELEASE ORAL
Qty: 90 CAPSULE | Refills: 4 | Status: SHIPPED | OUTPATIENT
Start: 2019-07-05 | End: 2020-02-13 | Stop reason: SDUPTHER

## 2019-08-28 RX ORDER — LISINOPRIL AND HYDROCHLOROTHIAZIDE 20; 12.5 MG/1; MG/1
1 TABLET ORAL DAILY
Qty: 90 TABLET | Refills: 5 | Status: SHIPPED | OUTPATIENT
Start: 2019-08-28 | End: 2020-08-25 | Stop reason: SDUPTHER

## 2019-09-05 ENCOUNTER — OFFICE VISIT (OUTPATIENT)
Dept: INTERNAL MEDICINE CLINIC | Age: 64
End: 2019-09-05
Payer: MEDICARE

## 2019-09-05 VITALS
SYSTOLIC BLOOD PRESSURE: 152 MMHG | BODY MASS INDEX: 38.96 KG/M2 | WEIGHT: 213 LBS | DIASTOLIC BLOOD PRESSURE: 80 MMHG | OXYGEN SATURATION: 98 % | HEART RATE: 82 BPM

## 2019-09-05 DIAGNOSIS — Z79.4 TYPE 2 DIABETES MELLITUS WITHOUT COMPLICATION, WITH LONG-TERM CURRENT USE OF INSULIN (HCC): ICD-10-CM

## 2019-09-05 DIAGNOSIS — F41.9 ANXIETY AND DEPRESSION: ICD-10-CM

## 2019-09-05 DIAGNOSIS — I10 ESSENTIAL HYPERTENSION: Primary | ICD-10-CM

## 2019-09-05 DIAGNOSIS — G89.4 CHRONIC PAIN SYNDROME: ICD-10-CM

## 2019-09-05 DIAGNOSIS — E78.2 MIXED HYPERLIPIDEMIA: ICD-10-CM

## 2019-09-05 DIAGNOSIS — E11.9 TYPE 2 DIABETES MELLITUS WITHOUT COMPLICATION, WITH LONG-TERM CURRENT USE OF INSULIN (HCC): ICD-10-CM

## 2019-09-05 DIAGNOSIS — F32.A ANXIETY AND DEPRESSION: ICD-10-CM

## 2019-09-05 LAB
A/G RATIO: 1.7 (ref 1.1–2.2)
ALBUMIN SERPL-MCNC: 4.3 G/DL (ref 3.4–5)
ALP BLD-CCNC: 43 U/L (ref 40–129)
ALT SERPL-CCNC: 19 U/L (ref 10–40)
ANION GAP SERPL CALCULATED.3IONS-SCNC: 14 MMOL/L (ref 3–16)
AST SERPL-CCNC: 18 U/L (ref 15–37)
BILIRUB SERPL-MCNC: 0.3 MG/DL (ref 0–1)
BUN BLDV-MCNC: 19 MG/DL (ref 7–20)
CALCIUM SERPL-MCNC: 9.1 MG/DL (ref 8.3–10.6)
CHLORIDE BLD-SCNC: 105 MMOL/L (ref 99–110)
CHOLESTEROL, TOTAL: 150 MG/DL (ref 0–199)
CO2: 20 MMOL/L (ref 21–32)
CREAT SERPL-MCNC: 0.9 MG/DL (ref 0.6–1.2)
GFR AFRICAN AMERICAN: >60
GFR NON-AFRICAN AMERICAN: >60
GLOBULIN: 2.6 G/DL
GLUCOSE BLD-MCNC: 71 MG/DL (ref 70–99)
HBA1C MFR BLD: 6.2 %
HCT VFR BLD CALC: 38 % (ref 36–48)
HDLC SERPL-MCNC: 52 MG/DL (ref 40–60)
HEMOGLOBIN: 12.4 G/DL (ref 12–16)
LDL CHOLESTEROL CALCULATED: 83 MG/DL
MCH RBC QN AUTO: 28.1 PG (ref 26–34)
MCHC RBC AUTO-ENTMCNC: 32.8 G/DL (ref 31–36)
MCV RBC AUTO: 85.8 FL (ref 80–100)
PDW BLD-RTO: 14.2 % (ref 12.4–15.4)
PLATELET # BLD: 287 K/UL (ref 135–450)
PMV BLD AUTO: 10 FL (ref 5–10.5)
POTASSIUM SERPL-SCNC: 3.8 MMOL/L (ref 3.5–5.1)
RBC # BLD: 4.43 M/UL (ref 4–5.2)
SODIUM BLD-SCNC: 139 MMOL/L (ref 136–145)
T4 FREE: 1.1 NG/DL (ref 0.9–1.8)
TOTAL PROTEIN: 6.9 G/DL (ref 6.4–8.2)
TRIGL SERPL-MCNC: 75 MG/DL (ref 0–150)
TSH REFLEX FT4: 4.6 UIU/ML (ref 0.27–4.2)
VLDLC SERPL CALC-MCNC: 15 MG/DL
WBC # BLD: 10.4 K/UL (ref 4–11)

## 2019-09-05 PROCEDURE — G8427 DOCREV CUR MEDS BY ELIG CLIN: HCPCS | Performed by: NURSE PRACTITIONER

## 2019-09-05 PROCEDURE — 90686 IIV4 VACC NO PRSV 0.5 ML IM: CPT | Performed by: NURSE PRACTITIONER

## 2019-09-05 PROCEDURE — 3044F HG A1C LEVEL LT 7.0%: CPT | Performed by: NURSE PRACTITIONER

## 2019-09-05 PROCEDURE — 1036F TOBACCO NON-USER: CPT | Performed by: NURSE PRACTITIONER

## 2019-09-05 PROCEDURE — G8599 NO ASA/ANTIPLAT THER USE RNG: HCPCS | Performed by: NURSE PRACTITIONER

## 2019-09-05 PROCEDURE — 36415 COLL VENOUS BLD VENIPUNCTURE: CPT | Performed by: NURSE PRACTITIONER

## 2019-09-05 PROCEDURE — G8417 CALC BMI ABV UP PARAM F/U: HCPCS | Performed by: NURSE PRACTITIONER

## 2019-09-05 PROCEDURE — G0008 ADMIN INFLUENZA VIRUS VAC: HCPCS | Performed by: NURSE PRACTITIONER

## 2019-09-05 PROCEDURE — 2022F DILAT RTA XM EVC RTNOPTHY: CPT | Performed by: NURSE PRACTITIONER

## 2019-09-05 PROCEDURE — 83036 HEMOGLOBIN GLYCOSYLATED A1C: CPT | Performed by: NURSE PRACTITIONER

## 2019-09-05 PROCEDURE — 3017F COLORECTAL CA SCREEN DOC REV: CPT | Performed by: NURSE PRACTITIONER

## 2019-09-05 PROCEDURE — 99214 OFFICE O/P EST MOD 30 MIN: CPT | Performed by: NURSE PRACTITIONER

## 2019-09-05 RX ORDER — ALPRAZOLAM 0.5 MG/1
0.5 TABLET ORAL NIGHTLY PRN
COMMUNITY
End: 2019-09-05

## 2019-09-05 ASSESSMENT — ENCOUNTER SYMPTOMS
ABDOMINAL DISTENTION: 0
COUGH: 0
SINUS PRESSURE: 0
SINUS PAIN: 0
SHORTNESS OF BREATH: 0
ANAL BLEEDING: 0
RHINORRHEA: 0
CHOKING: 0
ABDOMINAL PAIN: 0
CHEST TIGHTNESS: 0
ORTHOPNEA: 0
BACK PAIN: 0
COLOR CHANGE: 0
BLURRED VISION: 0
VISUAL CHANGE: 0

## 2019-09-11 ENCOUNTER — OFFICE VISIT (OUTPATIENT)
Dept: INTERNAL MEDICINE CLINIC | Age: 64
End: 2019-09-11
Payer: MEDICARE

## 2019-09-11 ENCOUNTER — TELEPHONE (OUTPATIENT)
Dept: INTERNAL MEDICINE CLINIC | Age: 64
End: 2019-09-11

## 2019-09-11 VITALS
TEMPERATURE: 98.6 F | WEIGHT: 225 LBS | DIASTOLIC BLOOD PRESSURE: 80 MMHG | BODY MASS INDEX: 41.41 KG/M2 | SYSTOLIC BLOOD PRESSURE: 138 MMHG | HEIGHT: 62 IN | HEART RATE: 94 BPM | OXYGEN SATURATION: 97 % | RESPIRATION RATE: 12 BRPM

## 2019-09-11 DIAGNOSIS — I10 ESSENTIAL HYPERTENSION: Primary | ICD-10-CM

## 2019-09-11 PROCEDURE — G8599 NO ASA/ANTIPLAT THER USE RNG: HCPCS | Performed by: NURSE PRACTITIONER

## 2019-09-11 PROCEDURE — 99213 OFFICE O/P EST LOW 20 MIN: CPT | Performed by: NURSE PRACTITIONER

## 2019-09-11 PROCEDURE — G8417 CALC BMI ABV UP PARAM F/U: HCPCS | Performed by: NURSE PRACTITIONER

## 2019-09-11 PROCEDURE — G8510 SCR DEP NEG, NO PLAN REQD: HCPCS | Performed by: NURSE PRACTITIONER

## 2019-09-11 PROCEDURE — 3017F COLORECTAL CA SCREEN DOC REV: CPT | Performed by: NURSE PRACTITIONER

## 2019-09-11 PROCEDURE — G8427 DOCREV CUR MEDS BY ELIG CLIN: HCPCS | Performed by: NURSE PRACTITIONER

## 2019-09-11 PROCEDURE — 1036F TOBACCO NON-USER: CPT | Performed by: NURSE PRACTITIONER

## 2019-09-11 ASSESSMENT — ENCOUNTER SYMPTOMS
STRIDOR: 0
SHORTNESS OF BREATH: 0
ORTHOPNEA: 0
CHOKING: 0
BLURRED VISION: 0
COUGH: 0
CHEST TIGHTNESS: 0
WHEEZING: 0

## 2019-09-11 ASSESSMENT — PATIENT HEALTH QUESTIONNAIRE - PHQ9
SUM OF ALL RESPONSES TO PHQ QUESTIONS 1-9: 0
SUM OF ALL RESPONSES TO PHQ QUESTIONS 1-9: 0
1. LITTLE INTEREST OR PLEASURE IN DOING THINGS: 0
2. FEELING DOWN, DEPRESSED OR HOPELESS: 0
SUM OF ALL RESPONSES TO PHQ9 QUESTIONS 1 & 2: 0

## 2019-09-11 NOTE — PROGRESS NOTES
tenderness. Neurological: She is alert and oriented to person, place, and time. Skin: Skin is warm and dry. Psychiatric: She has a normal mood and affect. Assessment/Plan   Jacque Uriostegui was seen today for other. Diagnoses and all orders for this visit:    Essential hypertension, stable  -Continue ACE/HCTZ  -continue lasix     Patient instructed should SOB/CP or any other concerning s/s occur, to go to ER ASAP  All questions addressed and answered. Patient agrees with plan of care. No follow-ups on file.

## 2019-12-18 ENCOUNTER — OFFICE VISIT (OUTPATIENT)
Dept: INTERNAL MEDICINE CLINIC | Age: 64
End: 2019-12-18
Payer: MEDICARE

## 2019-12-18 VITALS
RESPIRATION RATE: 16 BRPM | BODY MASS INDEX: 39.01 KG/M2 | DIASTOLIC BLOOD PRESSURE: 78 MMHG | SYSTOLIC BLOOD PRESSURE: 136 MMHG | WEIGHT: 212 LBS | OXYGEN SATURATION: 98 % | HEIGHT: 62 IN | HEART RATE: 85 BPM

## 2019-12-18 DIAGNOSIS — E11.9 TYPE 2 DIABETES MELLITUS WITHOUT COMPLICATION, WITH LONG-TERM CURRENT USE OF INSULIN (HCC): Primary | ICD-10-CM

## 2019-12-18 DIAGNOSIS — I63.419 CEREBROVASCULAR ACCIDENT (CVA) DUE TO EMBOLISM OF MIDDLE CEREBRAL ARTERY, UNSPECIFIED BLOOD VESSEL LATERALITY (HCC): ICD-10-CM

## 2019-12-18 DIAGNOSIS — E78.2 MIXED HYPERLIPIDEMIA: Chronic | ICD-10-CM

## 2019-12-18 DIAGNOSIS — I10 ESSENTIAL HYPERTENSION: Chronic | ICD-10-CM

## 2019-12-18 DIAGNOSIS — F41.9 ANXIETY: ICD-10-CM

## 2019-12-18 DIAGNOSIS — Z79.4 TYPE 2 DIABETES MELLITUS WITHOUT COMPLICATION, WITH LONG-TERM CURRENT USE OF INSULIN (HCC): Primary | ICD-10-CM

## 2019-12-18 LAB — HBA1C MFR BLD: 6.3 %

## 2019-12-18 PROCEDURE — 3044F HG A1C LEVEL LT 7.0%: CPT | Performed by: INTERNAL MEDICINE

## 2019-12-18 PROCEDURE — G8417 CALC BMI ABV UP PARAM F/U: HCPCS | Performed by: INTERNAL MEDICINE

## 2019-12-18 PROCEDURE — 3017F COLORECTAL CA SCREEN DOC REV: CPT | Performed by: INTERNAL MEDICINE

## 2019-12-18 PROCEDURE — G8427 DOCREV CUR MEDS BY ELIG CLIN: HCPCS | Performed by: INTERNAL MEDICINE

## 2019-12-18 PROCEDURE — 99214 OFFICE O/P EST MOD 30 MIN: CPT | Performed by: INTERNAL MEDICINE

## 2019-12-18 PROCEDURE — G8482 FLU IMMUNIZE ORDER/ADMIN: HCPCS | Performed by: INTERNAL MEDICINE

## 2019-12-18 PROCEDURE — 83036 HEMOGLOBIN GLYCOSYLATED A1C: CPT | Performed by: INTERNAL MEDICINE

## 2019-12-18 PROCEDURE — G8599 NO ASA/ANTIPLAT THER USE RNG: HCPCS | Performed by: INTERNAL MEDICINE

## 2019-12-18 PROCEDURE — 1036F TOBACCO NON-USER: CPT | Performed by: INTERNAL MEDICINE

## 2019-12-18 PROCEDURE — 2022F DILAT RTA XM EVC RTNOPTHY: CPT | Performed by: INTERNAL MEDICINE

## 2019-12-18 RX ORDER — ALPRAZOLAM 0.25 MG/1
0.25 TABLET ORAL 3 TIMES DAILY PRN
Qty: 30 TABLET | Refills: 1 | Status: SHIPPED | OUTPATIENT
Start: 2019-12-18 | End: 2020-01-17

## 2019-12-18 ASSESSMENT — ENCOUNTER SYMPTOMS
SHORTNESS OF BREATH: 0
BLURRED VISION: 0
COUGH: 0
ORTHOPNEA: 0

## 2020-01-27 RX ORDER — FUROSEMIDE 40 MG/1
TABLET ORAL
Qty: 90 TABLET | Refills: 3 | Status: SHIPPED | OUTPATIENT
Start: 2020-01-27 | End: 2020-11-05

## 2020-02-10 ENCOUNTER — TELEPHONE (OUTPATIENT)
Dept: INTERNAL MEDICINE CLINIC | Age: 65
End: 2020-02-10

## 2020-02-10 NOTE — TELEPHONE ENCOUNTER
Patient wife says patient needs a new script for 2020. The one Khadijah sent in was for 2019.  Please advise    esomeprazole (Apttus) 40 MG delayed release capsule [[id number]]     Order Details   Dose, Route, Frequency: As Directed    Dispense Quantity: 90 capsule Refills: 4 Fills remaining: --                [facility]# [id number]

## 2020-02-11 NOTE — TELEPHONE ENCOUNTER
Ludy Garvin wants to make sure that a PA for  Rx for brand Nexium 40 mg will be done/  PA has to be done once a year. Call Ludy Garvin.

## 2020-02-12 NOTE — TELEPHONE ENCOUNTER
Alek Aguayo from \[facility]\"" [phone] says patient is at the pharmacy say she prefer a brand of Nexium so they need a new script that deven MONTALVO 1.  Please advise

## 2020-02-12 NOTE — TELEPHONE ENCOUNTER
Tried calling, line rang several times then disconnected, just want to make pt aware that med was approved

## 2020-02-26 RX ORDER — BLOOD SUGAR DIAGNOSTIC
STRIP MISCELLANEOUS
Qty: 150 STRIP | Refills: 5 | Status: SHIPPED | OUTPATIENT
Start: 2020-02-26 | End: 2020-07-29

## 2020-02-26 NOTE — TELEPHONE ENCOUNTER
LAST OFFICE VISIT:12/18/2019      Future Appointments   Date Time Provider Yazan Willoughby   3/23/2020  9:30 AM Emi Marcano MD [facility] [place]

## 2020-06-09 ENCOUNTER — VIRTUAL VISIT (OUTPATIENT)
Dept: INTERNAL MEDICINE CLINIC | Age: 65
End: 2020-06-09
Payer: MEDICARE

## 2020-06-09 PROCEDURE — 3017F COLORECTAL CA SCREEN DOC REV: CPT | Performed by: NURSE PRACTITIONER

## 2020-06-09 PROCEDURE — G0438 PPPS, INITIAL VISIT: HCPCS | Performed by: NURSE PRACTITIONER

## 2020-06-09 ASSESSMENT — LIFESTYLE VARIABLES: HOW OFTEN DO YOU HAVE A DRINK CONTAINING ALCOHOL: 0

## 2020-06-09 ASSESSMENT — PATIENT HEALTH QUESTIONNAIRE - PHQ9
SUM OF ALL RESPONSES TO PHQ QUESTIONS 1-9: 0
SUM OF ALL RESPONSES TO PHQ QUESTIONS 1-9: 0

## 2020-06-09 NOTE — PROGRESS NOTES
Medicare Annual Wellness Visit  Are Name: Ania Montana Date: 2020   MRN: <[id number]> Sex: Female   Age: 62 y.o. Ethnicity: Non-/Non    : 1955 Race: Black      Bladimir Cannon is here for Medicare AWV    Screenings for behavioral, psychosocial and functional/safety risks, and cognitive dysfunction are all negative except as indicated below. These results, as well as other patient data from the [address] form, are documented in Flowsheets linked to this Encounter. Allergies   Allergen Reactions    Ampicillin     Penicillins        Prior to Visit Medications    Medication Sig Taking? Authorizing Provider   ACCU-CHEK MOOSE PLUS strip USE TO TEST BLOOD SUGAR TWO TIMES A DAY AS NEEDED Yes Damir Morris MD   NEXIUM 40 MG delayed release capsule TAKE ONE CAPSULE BY MOUTH DAILY BEFORE LUNCH Yes Damir Morris MD   furosemide (LASIX) 40 MG tablet TAKE ONE TABLET BY MOUTH DAILY Yes Damir Morris MD   BD INSULIN SYRINGE U/F 31G X /16\" 0.5 ML MISC USE THREE TIMES A DAY WITH INSULIN Yes SAMARA Guzman CNP   ACCU-CHEK SOFTCLIX LANCETS MISC USE TO TEST BLOOD SUGAR TWO TIMES A DAY Yes SAMARA Guzman CNP   potassium chloride (KLOR-CON M) 20 MEQ extended release tablet TAKE ONE TABLET BY MOUTH DAILY Yes SAMARA Guzman CNP   atorvastatin (LIPITOR) 20 MG tablet TAKE ONE TABLET BY MOUTH DAILY Yes SAMARA Cam CNP   lisinopril-hydrochlorothiazide (PRINZIDE;ZESTORETIC) 20-12.5 MG per tablet Take 1 tablet by mouth daily Yes SAMARA Guzman CNP   LANTUS 100 UNIT/ML injection vial INJECT UNDER THE SKIN 80 UNITS EVERY NIGHT AT BEDTIME Yes SAMARA Alvarez CNP   betamethasone dipropionate (DIPROLENE) 0.05 % ointment Apply topically daily.  Yes Kristy Russo MD   Blood Glucose Monitoring Suppl (ACCU-CHEK MOOSE CONNECT) W/DEVICE KIT 1 kit by Does not apply route daily Yes Francesco FARIAS

## 2020-06-26 RX ORDER — POTASSIUM CHLORIDE 20 MEQ/1
TABLET, EXTENDED RELEASE ORAL
Qty: 90 TABLET | Refills: 3 | Status: SHIPPED | OUTPATIENT
Start: 2020-06-26 | End: 2021-06-03

## 2020-07-08 ENCOUNTER — OFFICE VISIT (OUTPATIENT)
Dept: INTERNAL MEDICINE CLINIC | Age: 65
End: 2020-07-08
Payer: MEDICARE

## 2020-07-08 VITALS
DIASTOLIC BLOOD PRESSURE: 83 MMHG | HEART RATE: 80 BPM | HEIGHT: 62 IN | OXYGEN SATURATION: 95 % | TEMPERATURE: 98.5 F | SYSTOLIC BLOOD PRESSURE: 137 MMHG | BODY MASS INDEX: 37.58 KG/M2 | RESPIRATION RATE: 16 BRPM | WEIGHT: 204.2 LBS

## 2020-07-08 LAB
A/G RATIO: 1.7 (ref 1.1–2.2)
ALBUMIN SERPL-MCNC: 4.3 G/DL (ref 3.4–5)
ALP BLD-CCNC: 53 U/L (ref 40–129)
ALT SERPL-CCNC: 18 U/L (ref 10–40)
ANION GAP SERPL CALCULATED.3IONS-SCNC: 15 MMOL/L (ref 3–16)
AST SERPL-CCNC: 16 U/L (ref 15–37)
BILIRUB SERPL-MCNC: 0.4 MG/DL (ref 0–1)
BUN BLDV-MCNC: 11 MG/DL (ref 7–20)
CALCIUM SERPL-MCNC: 9.5 MG/DL (ref 8.3–10.6)
CHLORIDE BLD-SCNC: 103 MMOL/L (ref 99–110)
CO2: 23 MMOL/L (ref 21–32)
CREAT SERPL-MCNC: 1 MG/DL (ref 0.6–1.2)
GFR AFRICAN AMERICAN: >60
GFR NON-AFRICAN AMERICAN: 56
GLOBULIN: 2.6 G/DL
GLUCOSE BLD-MCNC: 89 MG/DL (ref 70–99)
LDL CHOLESTEROL DIRECT: 95 MG/DL
POTASSIUM SERPL-SCNC: 4 MMOL/L (ref 3.5–5.1)
SODIUM BLD-SCNC: 141 MMOL/L (ref 136–145)
TOTAL PROTEIN: 6.9 G/DL (ref 6.4–8.2)
TSH REFLEX: 4.16 UIU/ML (ref 0.27–4.2)

## 2020-07-08 PROCEDURE — G8427 DOCREV CUR MEDS BY ELIG CLIN: HCPCS | Performed by: INTERNAL MEDICINE

## 2020-07-08 PROCEDURE — 36415 COLL VENOUS BLD VENIPUNCTURE: CPT | Performed by: INTERNAL MEDICINE

## 2020-07-08 PROCEDURE — 3017F COLORECTAL CA SCREEN DOC REV: CPT | Performed by: INTERNAL MEDICINE

## 2020-07-08 PROCEDURE — 1036F TOBACCO NON-USER: CPT | Performed by: INTERNAL MEDICINE

## 2020-07-08 PROCEDURE — 3046F HEMOGLOBIN A1C LEVEL >9.0%: CPT | Performed by: INTERNAL MEDICINE

## 2020-07-08 PROCEDURE — 2022F DILAT RTA XM EVC RTNOPTHY: CPT | Performed by: INTERNAL MEDICINE

## 2020-07-08 PROCEDURE — 99214 OFFICE O/P EST MOD 30 MIN: CPT | Performed by: INTERNAL MEDICINE

## 2020-07-08 PROCEDURE — G8417 CALC BMI ABV UP PARAM F/U: HCPCS | Performed by: INTERNAL MEDICINE

## 2020-07-08 SDOH — ECONOMIC STABILITY: TRANSPORTATION INSECURITY
IN THE PAST 12 MONTHS, HAS LACK OF TRANSPORTATION KEPT YOU FROM MEETINGS, WORK, OR FROM GETTING THINGS NEEDED FOR DAILY LIVING?: NO

## 2020-07-08 SDOH — ECONOMIC STABILITY: FOOD INSECURITY: WITHIN THE PAST 12 MONTHS, THE FOOD YOU BOUGHT JUST DIDN'T LAST AND YOU DIDN'T HAVE MONEY TO GET MORE.: NEVER TRUE

## 2020-07-08 SDOH — ECONOMIC STABILITY: INCOME INSECURITY: HOW HARD IS IT FOR YOU TO PAY FOR THE VERY BASICS LIKE FOOD, HOUSING, MEDICAL CARE, AND HEATING?: NOT HARD AT ALL

## 2020-07-08 SDOH — ECONOMIC STABILITY: TRANSPORTATION INSECURITY
IN THE PAST 12 MONTHS, HAS THE LACK OF TRANSPORTATION KEPT YOU FROM MEDICAL APPOINTMENTS OR FROM GETTING MEDICATIONS?: NO

## 2020-07-08 SDOH — ECONOMIC STABILITY: FOOD INSECURITY: WITHIN THE PAST 12 MONTHS, YOU WORRIED THAT YOUR FOOD WOULD RUN OUT BEFORE YOU GOT MONEY TO BUY MORE.: NEVER TRUE

## 2020-07-08 ASSESSMENT — ENCOUNTER SYMPTOMS
BLURRED VISION: 0
COUGH: 0
ORTHOPNEA: 0
SHORTNESS OF BREATH: 0

## 2020-07-08 NOTE — TELEPHONE ENCOUNTER
Last office visit:06/09/2020    Future Appointments   Date Time Provider Yazan Willoughby   7/8/2020 11:30 AM Emi Marcano MD [facility] [place]

## 2020-07-08 NOTE — PROGRESS NOTES
Subjective:    cc:  Dm, htn, hld, cva, anxiety  Patient ID: Rob Collier is a 60 y.o. female. Hypertension   This is a chronic problem. The current episode started more than 1 year ago. The problem is unchanged. The problem is controlled. Pertinent negatives include no blurred vision, chest pain, headaches, orthopnea, palpitations, peripheral edema, PND, shortness of breath or sweats. There are no associated agents to hypertension. Risk factors for coronary artery disease include diabetes mellitus, dyslipidemia, obesity, stress, sedentary lifestyle and family history. Past treatments include diuretics and ACE inhibitors. The current treatment provides significant improvement. Compliance problems include exercise and diet. Diabetes   She presents for her follow-up diabetic visit. She has type 2 diabetes mellitus. Her disease course has been stable. There are no hypoglycemic associated symptoms. Pertinent negatives for hypoglycemia include no dizziness, headaches, seizures or sweats. Associated symptoms include weakness. Pertinent negatives for diabetes include no blurred vision, no chest pain, no fatigue, no polydipsia, no polyphagia and no polyuria. There are no hypoglycemic complications. Symptoms are stable. There are no diabetic complications. Risk factors for coronary artery disease include diabetes mellitus, dyslipidemia, hypertension, family history, sedentary lifestyle, stress and obesity. Current diabetic treatment includes insulin injections. She is compliant with treatment all of the time. Her weight is stable. She is following a diabetic diet. Meal planning includes avoidance of concentrated sweets. She has not had a previous visit with a dietitian. She never participates in exercise. There is no change in her home blood glucose trend. Her overall blood glucose range is 130-140 mg/dl. An ACE inhibitor/angiotensin II receptor blocker is being taken. She does not see a podiatrist.Eye exam is current. Hyperlipidemia   This is a chronic problem. The current episode started more than 1 year ago. The problem is controlled. Recent lipid tests were reviewed and are normal. Exacerbating diseases include diabetes and obesity. There are no known factors aggravating her hyperlipidemia. Pertinent negatives include no chest pain, focal sensory loss, focal weakness, leg pain, myalgias or shortness of breath. Current antihyperlipidemic treatment includes statins. The current treatment provides significant improvement of lipids. Compliance problems include adherence to exercise. Risk factors for coronary artery disease include diabetes mellitus, dyslipidemia, hypertension, obesity, family history, a sedentary lifestyle, post-menopausal and stress. Anxiety:  Chronic problem x yrs. Only intermittently. Rarely using but asking for rf. Last fill 3/2019. Still has 5 pills left. Sp cva:  Right sided weakness chronically. C/o right knee pain. X 1 month. No injury. Able to walk without pain. Pain intermittent. Feels like it will give out on her. No falls. Tylenol no help. Pain on getting out of a chair according to sister. Review of Systems   Constitutional: Negative for appetite change, fatigue, fever and unexpected weight change. Eyes: Negative for blurred vision. Respiratory: Negative for cough and shortness of breath. Cardiovascular: Negative for chest pain, palpitations, orthopnea, leg swelling and PND. Endocrine: Negative for polydipsia, polyphagia and polyuria. Musculoskeletal: Positive for arthralgias and gait problem. Negative for myalgias. Neurological: Positive for weakness. Negative for dizziness, focal weakness, seizures, syncope, light-headedness, numbness and headaches. Prior to Visit Medications    Medication Sig Taking?  Authorizing Provider   NEXIUM 40 MG delayed release capsule TAKE ONE CAPSULE BY MOUTH DAILY BEFORE LUNCH  HENRRY Rosario MD   potassium chloride (KLOR-CON M) 20 MEQ extended release tablet TAKE ONE TABLET BY MOUTH DAILY  HENRRY Moura MD   ACCU-CHEK MOOSE PLUS strip USE TO TEST BLOOD SUGAR TWO TIMES A DAY AS NEEDED  Mich Kramer MD   furosemide (LASIX) 40 MG tablet TAKE ONE TABLET BY MOUTH DAILY  HENRRY Moura MD   BD INSULIN SYRINGE U/F 31G X 5/16\" 0.5 ML MISC USE THREE TIMES A DAY WITH INSULIN  SAMARA Munson CNP   ACCU-CHEK SOFTCLIX LANCETS MISC USE TO TEST BLOOD SUGAR TWO TIMES A DAY  SAMARA Alvarez CNP   atorvastatin (LIPITOR) 20 MG tablet TAKE ONE TABLET BY MOUTH DAILY  SAMARA Clark CNP   lisinopril-hydrochlorothiazide (PRINZIDE;ZESTORETIC) 20-12.5 MG per tablet Take 1 tablet by mouth daily  SAMARA Munsno CNP   LANTUS 100 UNIT/ML injection vial INJECT UNDER THE SKIN 80 UNITS EVERY NIGHT AT BEDTIME  SAMARA Alvarez CNP   betamethasone dipropionate (DIPROLENE) 0.05 % ointment Apply topically daily. Jatinder West MD   Blood Glucose Monitoring Suppl (ACCU-CHEK MOOSE CONNECT) W/DEVICE KIT 1 kit by Does not apply route daily  Jatinder West MD       Allergies   Allergen Reactions    Ampicillin     Penicillins        /83 (Site: Right Upper Arm, Position: Sitting, Cuff Size: Large Adult)   Pulse 80   Temp 98.5 °F (36.9 °C)   Resp 16   Ht 5' 2\" (1.575 m)   Wt 204 lb 3.2 oz (92.6 kg)   SpO2 95%   Breastfeeding No   BMI 37.35 kg/m²     Objective:   Physical Exam  Constitutional:       General: She is not in acute distress. Appearance: Normal appearance. She is well-developed. She is obese. She is not ill-appearing. Neck:      Vascular: No JVD. Comments: No bruits  Cardiovascular:      Rate and Rhythm: Normal rate and regular rhythm. Heart sounds: Normal heart sounds. No murmur. No friction rub. No gallop. Pulmonary:      Effort: Pulmonary effort is normal. No respiratory distress. Breath sounds: Normal breath sounds. No wheezing, rhonchi or rales. Musculoskeletal:         General: No swelling. Right knee: She exhibits normal range of motion, no swelling and no effusion. Right lower leg: No edema. Left lower leg: No edema. Comments: + crepitus r knee  Tender medial joint line   Skin:     General: Skin is warm and dry. Neurological:      Mental Status: She is alert and oriented to person, place, and time. Mental status is at baseline. Motor: Weakness present. Comments: r arm and leg weakness. Psychiatric:         Mood and Affect: Mood normal.         Behavior: Behavior normal.         Thought Content: Thought content normal.         Judgment: Judgment normal.         Assessment:      1. Type 2 diabetes mellitus without complication, with long-term current use of insulin ([address]    2. Essential hypertension    3. Mixed hyperlipidemia    4. Anxiety    5. Cerebrovascular accident (CVA) due to embolism of middle cerebral artery, unspecified blood vessel laterality ([facility] [address]    6. Acute pain of right knee            Plan:      Bhavna Ngo was seen today for diabetes and blood work. Diagnoses and all orders for this visit:    Type 2 diabetes mellitus without complication, with long-term current use of insulin [facility]):  Check labs, adjust med prn  -     Comprehensive Metabolic Panel; Future  -     LDL Cholesterol, Direct; Future  -     Cancel: TSH with Reflex; Future  -     Cancel: POCT glycosylated hemoglobin (Hb A1C)  -     LDL Cholesterol, Direct  -     Comprehensive Metabolic Panel  -     Hemoglobin A1C; Future  -     Hemoglobin A1C    Essential hypertension:  Stable, cont same meds  -     Comprehensive Metabolic Panel; Future  -     Comprehensive Metabolic Panel    Mixed hyperlipidemia:  Check labs, adjust med prn  -     Comprehensive Metabolic Panel; Future  -     LDL Cholesterol, Direct;  Future  -     LDL Cholesterol, Direct  -     Comprehensive Metabolic Panel    Anxiety: to analisa  -     Cancel: TSH with Reflex; Future  -     TSH with Reflex; Future  -     TSH with Reflex    Cerebrovascular accident (CVA) due to embolism of middle cerebral artery, unspecified blood vessel laterality [facility]):  Check labs, adjust med prn  -     Comprehensive Metabolic Panel; Future  -     LDL Cholesterol, Direct; Future  -     LDL Cholesterol, Direct  -     Comprehensive Metabolic Panel    Acute pain of right knee: To ortho  -     Katerina Bowles MD, Orthopedic Surgery, [facility]          Controlled Substance Monitoring:    Acute and Chronic Pain Monitoring:   RX Monitoring 7/8/2020   Attestation -   Acute Pain Prescriptions -   Periodic Controlled Substance Monitoring No signs of potential drug abuse or diversion identified. Chronic Pain > 50 MEDD Obtained or confirmed \"Consent for Opioid Use\" on file.    Chronic Pain > 80 MEDD -           3 months          Ara Ramirez MD

## 2020-07-09 LAB
ESTIMATED AVERAGE GLUCOSE: 122.6 MG/DL
HBA1C MFR BLD: 5.9 %

## 2020-07-10 ENCOUNTER — TELEPHONE (OUTPATIENT)
Dept: INTERNAL MEDICINE CLINIC | Age: 65
End: 2020-07-10

## 2020-07-10 NOTE — TELEPHONE ENCOUNTER
Both Ho Clements and Noni Allred are supposed to see Taniya Booker. We talked about it at their appts.

## 2020-07-10 NOTE — TELEPHONE ENCOUNTER
Dr. Nando Arce,    Dr. Kaela Vallejo gave me a couple pts to call that you referred to him. This was one of them. When I called, I was told by the sister that she was the one seeing Kaela Vallejo, not this pt. Looking at pts history it does say depression and anxiety. Before I call back I wanted to just make sure that she is supposed to see him as well. Thanks!

## 2020-07-17 ENCOUNTER — TELEPHONE (OUTPATIENT)
Dept: INTERNAL MEDICINE CLINIC | Age: 65
End: 2020-07-17

## 2020-07-17 NOTE — TELEPHONE ENCOUNTER
CLEO--Tianna Helms called and said she iliana make an appt for [facility] with Dr. Carlos Westfall soon.

## 2020-07-29 RX ORDER — BLOOD SUGAR DIAGNOSTIC
STRIP MISCELLANEOUS
Qty: 150 STRIP | Refills: 12 | Status: SHIPPED | OUTPATIENT
Start: 2020-07-29 | End: 2021-08-11

## 2020-08-06 RX ORDER — ATORVASTATIN CALCIUM 20 MG/1
TABLET, FILM COATED ORAL
Qty: 90 TABLET | Refills: 3 | Status: SHIPPED | OUTPATIENT
Start: 2020-08-06 | End: 2021-09-07 | Stop reason: SDUPTHER

## 2020-08-14 ENCOUNTER — TELEPHONE (OUTPATIENT)
Dept: ORTHOPEDIC SURGERY | Age: 65
End: 2020-08-14

## 2020-08-14 ENCOUNTER — OFFICE VISIT (OUTPATIENT)
Dept: ORTHOPEDIC SURGERY | Age: 65
End: 2020-08-14
Payer: MEDICARE

## 2020-08-14 VITALS — HEIGHT: 62 IN | TEMPERATURE: 96.3 F | WEIGHT: 203 LBS | BODY MASS INDEX: 37.36 KG/M2

## 2020-08-14 PROCEDURE — G8417 CALC BMI ABV UP PARAM F/U: HCPCS | Performed by: ORTHOPAEDIC SURGERY

## 2020-08-14 PROCEDURE — 1036F TOBACCO NON-USER: CPT | Performed by: ORTHOPAEDIC SURGERY

## 2020-08-14 PROCEDURE — G8427 DOCREV CUR MEDS BY ELIG CLIN: HCPCS | Performed by: ORTHOPAEDIC SURGERY

## 2020-08-14 PROCEDURE — 20610 DRAIN/INJ JOINT/BURSA W/O US: CPT | Performed by: ORTHOPAEDIC SURGERY

## 2020-08-14 PROCEDURE — 99203 OFFICE O/P NEW LOW 30 MIN: CPT | Performed by: ORTHOPAEDIC SURGERY

## 2020-08-14 PROCEDURE — 3017F COLORECTAL CA SCREEN DOC REV: CPT | Performed by: ORTHOPAEDIC SURGERY

## 2020-08-14 RX ORDER — METHYLPREDNISOLONE ACETATE 40 MG/ML
80 INJECTION, SUSPENSION INTRA-ARTICULAR; INTRALESIONAL; INTRAMUSCULAR; SOFT TISSUE ONCE
Status: COMPLETED | OUTPATIENT
Start: 2020-08-14 | End: 2020-08-14

## 2020-08-14 RX ORDER — LIDOCAINE HYDROCHLORIDE 10 MG/ML
2 INJECTION, SOLUTION INFILTRATION; PERINEURAL ONCE
Status: COMPLETED | OUTPATIENT
Start: 2020-08-14 | End: 2020-08-14

## 2020-08-14 RX ADMIN — METHYLPREDNISOLONE ACETATE 80 MG: 40 INJECTION, SUSPENSION INTRA-ARTICULAR; INTRALESIONAL; INTRAMUSCULAR; SOFT TISSUE at 11:22

## 2020-08-14 RX ADMIN — LIDOCAINE HYDROCHLORIDE 2 ML: 10 INJECTION, SOLUTION INFILTRATION; PERINEURAL at 11:21

## 2020-08-14 NOTE — PROGRESS NOTES
INITIAL EVALUATION OF KNEE                                                                    8/14/2020  Fidelia Mendez     1955       History of Present Illness:    Irene Montano is seen for Knee Pain (Right. Pain for 3 months. No injury )      This is a first office visit for this 21-year-old mentally disabled woman, who is accompanied by her sister to the office, and sent by Dr. Humphrey Ro for evaluation of right knee pain of 3 months duration without injury. Her pain is generalized throughout the knee without radiation. She has no pain at rest but pain up to 5 with walking. She does not utilize a walking aid. She is markedly limited in her ambulatory distance, as well as her daily activities. Past medical history does include a history of CVA with right hemiparesis approximately 5 years ago. She apparently had no loss of speech. She did undergo physical therapy and regain use of her right lower extremity but has limited use of right upper extremity. According to her sister, there has been no significant alcohol use since 1980. She is an insulin-dependent dependent diabetic and most recently hemoglobin A1c is 5.9. Social history was reviewed. She has never  and has lived lifelong with her sister who takes care of her. She does bathe and dress herself but does not do any household chores especially since her CVA. She does suffer from depression since the death of her mother about 15 years ago. Pertinent items are noted in HPI  Review of systems reviewed from Patient History Form dated on 8/14/20 and available in the patient's chart under the Media tab. The patient's past medical history, medications, allergies, family history, social history, HPI have been reviewed, dated, and recorded in the chart.      Temp 96.3 °F (35.7 °C)   Ht 5' 2\" (1.575 m)   Wt 203 lb (92.1 kg)   BMI 37.13 kg/m²     Physical examination:    General Appearance: no acute distress, alert, oriented x 3. Limps when walking: yes -                                                                     Right                                       Left  Swelling mild    Effusion  trace    Ecchymosis neg    Errythemia neg    Warmth neg    Deformity Valgus 10    Crepitus mild    Patellar Subluxation neg    Tenderness  Lateral     Log Roll neg    Patellar Apprehension neg    Patellar Grind Positive     Extension Lag neg neg     Neurovascular Status:   Range of Motion Extension Flexion Pules (0-4) Dorsalis  Pedis Posterior  Tibialis   Right 0    115      Degrees Right   2+    Left   0   130       Degrees Left                           Test Including Ligamentous Stability/ Positive or Negative                                       Test          Right         Left   Valgus   neg                                Varus 5    Lachman neg    Anterior Drawer neg    Posterior Drawer neg    Cynthia     Pivot Shift     Quadraceps Active     Atrophy                    cm                   cm       X-Ray Findings taken in Office: 3 views of the right knee were read by myself and shows complete loss of her lateral joint space associated with tricompartment minimal osteophytic changes. She does have a Marcie-Stieda sign at her medial epicondyle. Impression:   1. Severe osteoarthritis of the right knee with valgus deformity. 2.  Multiple comorbid conditions including diabetes hypertension status post CVA and depression. 3.  She is mentally disabled. Plan/Treatment:   1. The diagnosis and treatment options were discussed with Carolyn Oliveira and her Sister Samantha Sibley  2. Injection with cortisone was recommended into the Right  knee. After discussing potential risks and benefits including temporary elevation of her blood sugar, she agreed.  The injection site was cleaned with alcohol, anesthetized with 1 cc of 1% Lidoocaine then injected with 2 cc of 40 mg

## 2020-08-14 NOTE — PATIENT INSTRUCTIONS
Impression:   1. Severe osteoarthritis of the right knee with valgus deformity. 2.  Multiple comorbid conditions including diabetes hypertension status post CVA and depression. 3.  She is mentally disabled. Plan/Treatment:   1. The diagnosis and treatment options were discussed with Carolyn Oliveira and her Sister Samantha Sibley  2. Injection with cortisone was recommended into the Right  knee. After discussing potential risks and benefits including temporary elevation of her blood sugar, she agreed. The injection site was cleaned with alcohol, anesthetized with 1 cc of 1% Lidoocaine then injected with 2 cc of 40 mg Depomedrol intraarticular after sufficient time for analgesia was allowed. 3.  She is started home physical therapy to improve her ambulation. 4.  [employer] was recommended for use in her left hand. 5.  Continue weight loss was recommended. According to her sister she has lost 18 pounds recently  10. She will return here as needed.       Khurram Nunez MD  8/14/2020
Yes

## 2020-08-14 NOTE — TELEPHONE ENCOUNTER
[facility] STATES THEY'RE UNABLE TO STAFF. WILL HAVE TO TURN DOWN THIS REFERRAL.     [phone]

## 2020-08-18 ENCOUNTER — TELEPHONE (OUTPATIENT)
Dept: INTERNAL MEDICINE CLINIC | Age: 65
End: 2020-08-18

## 2020-08-18 ENCOUNTER — TELEPHONE (OUTPATIENT)
Dept: ORTHOPEDIC SURGERY | Age: 65
End: 2020-08-18

## 2020-08-18 NOTE — TELEPHONE ENCOUNTER
Spoke with Tory clarified medication list and how often noted FSBS tested. Would like to just FYI that home care will be following through Dr Alexander Rojas.

## 2020-08-18 NOTE — TELEPHONE ENCOUNTER
REQUESTING  VERBAL ORDERS FOR PHYSICAL THERAPY  PLAN: 2X A WEEK  FOR 3 WEEK  ALSO  REQ A VERBAL ORDER FOR OCCUPATION THERAPY  EVAL AND TREATMENT

## 2020-08-18 NOTE — TELEPHONE ENCOUNTER
Freddy Mott called back states sister [facility] on fish oil 0066 and folic acid 1766 is this ok.  Please advise

## 2020-08-21 ENCOUNTER — TELEPHONE (OUTPATIENT)
Dept: ORTHOPEDIC SURGERY | Age: 65
End: 2020-08-21

## 2020-08-24 ENCOUNTER — TELEPHONE (OUTPATIENT)
Dept: ORTHOPEDIC SURGERY | Age: 65
End: 2020-08-24

## 2020-08-25 ENCOUNTER — TELEPHONE (OUTPATIENT)
Dept: INTERNAL MEDICINE CLINIC | Age: 65
End: 2020-08-25

## 2020-08-25 RX ORDER — LISINOPRIL AND HYDROCHLOROTHIAZIDE 20; 12.5 MG/1; MG/1
1 TABLET ORAL 2 TIMES DAILY
Qty: 60 TABLET | Refills: 12 | Status: SHIPPED | OUTPATIENT
Start: 2020-08-25 | End: 2021-09-07 | Stop reason: SDUPTHER

## 2020-08-25 NOTE — TELEPHONE ENCOUNTER
Sp to Aysha with  [facility]     BP is 160/82 which was taken at 1:30  She had taken her Medication already this Morning. Last week is was 150/78 on the 18th.     Please advise  Aysha   [phone]

## 2020-08-25 NOTE — TELEPHONE ENCOUNTER
Called and spoke with Susu Hutson notified to increase lisinopril-hctz to BID. Called Sarita Rosales spoke with Padma Albarran notified of new order.

## 2020-08-27 ENCOUNTER — TELEPHONE (OUTPATIENT)
Dept: INTERNAL MEDICINE CLINIC | Age: 65
End: 2020-08-27

## 2020-08-28 ENCOUNTER — TELEPHONE (OUTPATIENT)
Dept: INTERNAL MEDICINE CLINIC | Age: 65
End: 2020-08-28

## 2020-08-28 NOTE — TELEPHONE ENCOUNTER
Called Laverne and left VM to call [facility] office back at [phone]

## 2020-09-01 ENCOUNTER — TELEPHONE (OUTPATIENT)
Dept: ORTHOPEDIC SURGERY | Age: 65
End: 2020-09-01

## 2020-09-01 ENCOUNTER — TELEPHONE (OUTPATIENT)
Dept: INTERNAL MEDICINE CLINIC | Age: 65
End: 2020-09-01

## 2020-09-01 RX ORDER — AMLODIPINE BESYLATE 5 MG/1
5 TABLET ORAL DAILY
Qty: 30 TABLET | Refills: 5 | Status: SHIPPED | OUTPATIENT
Start: 2020-09-01 | End: 2021-03-22

## 2020-09-01 NOTE — TELEPHONE ENCOUNTER
Make sure she is taking lisinopril bid. Sister told me she didn't want to. If she is taking bid, add norvasc 5 mg po qd # 30  X 5 rf.

## 2020-09-01 NOTE — TELEPHONE ENCOUNTER
I spoke with Sera Thompson from [facility]. I told her that it will be ok to do the therapy.  She will send the orders for Dr Katherin Zelaya to sign

## 2020-09-03 ENCOUNTER — TELEPHONE (OUTPATIENT)
Dept: INTERNAL MEDICINE CLINIC | Age: 65
End: 2020-09-03

## 2020-09-03 RX ORDER — INSULIN GLARGINE 100 [IU]/ML
INJECTION, SOLUTION SUBCUTANEOUS
Qty: 100 ML | Refills: 5 | Status: SHIPPED | OUTPATIENT
Start: 2020-09-03 | End: 2021-06-16 | Stop reason: DRUGHIGH

## 2020-09-03 RX ORDER — INSULIN GLARGINE 100 [IU]/ML
INJECTION, SOLUTION SUBCUTANEOUS
Status: CANCELLED | OUTPATIENT
Start: 2020-09-03

## 2020-09-03 NOTE — TELEPHONE ENCOUNTER
lov 7/8/20  Future Appointments   Date Time Provider Yazan Willoughby   10/14/2020 10:30 AM M Jaimie Wooten MD [facility] [place]

## 2020-09-10 ENCOUNTER — TELEPHONE (OUTPATIENT)
Dept: ORTHOPEDIC SURGERY | Age: 65
End: 2020-09-10

## 2020-09-14 RX ORDER — OXYBUTYNIN CHLORIDE 10 MG/1
10 TABLET, EXTENDED RELEASE ORAL DAILY
Qty: 30 TABLET | Refills: 3 | Status: SHIPPED | OUTPATIENT
Start: 2020-09-14 | End: 2020-10-14 | Stop reason: ALTCHOICE

## 2020-09-15 ENCOUNTER — TELEPHONE (OUTPATIENT)
Dept: ORTHOPEDIC SURGERY | Age: 65
End: 2020-09-15

## 2020-09-15 ENCOUNTER — TELEPHONE (OUTPATIENT)
Dept: INTERNAL MEDICINE CLINIC | Age: 65
End: 2020-09-15

## 2020-09-15 NOTE — TELEPHONE ENCOUNTER
She is discharged from homecare. She reports a fall in her driveway 9-86 and looks like abrasion R shin.

## 2020-09-15 NOTE — TELEPHONE ENCOUNTER
I spoke with Pete Case with [facility]. She said the patient fell but she was Wale Given just some abrasions. She was walking ok.   She does not need to be seen

## 2020-10-12 RX ORDER — BLOOD SUGAR DIAGNOSTIC
STRIP MISCELLANEOUS
Qty: 100 EACH | Refills: 12 | Status: SHIPPED | OUTPATIENT
Start: 2020-10-12 | End: 2021-10-14 | Stop reason: SDUPTHER

## 2020-10-12 NOTE — TELEPHONE ENCOUNTER
lov 7/8/20    Future Appointments   Date Time Provider Yazan Willoughby   10/14/2020 10:30 AM M Ghislaine Ayers MD [facility] [place]

## 2020-10-14 ENCOUNTER — OFFICE VISIT (OUTPATIENT)
Dept: INTERNAL MEDICINE CLINIC | Age: 65
End: 2020-10-14
Payer: MEDICARE

## 2020-10-14 VITALS
OXYGEN SATURATION: 96 % | WEIGHT: 207 LBS | DIASTOLIC BLOOD PRESSURE: 71 MMHG | RESPIRATION RATE: 16 BRPM | SYSTOLIC BLOOD PRESSURE: 125 MMHG | HEART RATE: 74 BPM | BODY MASS INDEX: 37.86 KG/M2 | TEMPERATURE: 98.1 F

## 2020-10-14 LAB — HBA1C MFR BLD: 6.2 %

## 2020-10-14 PROCEDURE — G0008 ADMIN INFLUENZA VIRUS VAC: HCPCS | Performed by: INTERNAL MEDICINE

## 2020-10-14 PROCEDURE — 83036 HEMOGLOBIN GLYCOSYLATED A1C: CPT | Performed by: INTERNAL MEDICINE

## 2020-10-14 PROCEDURE — G8417 CALC BMI ABV UP PARAM F/U: HCPCS | Performed by: INTERNAL MEDICINE

## 2020-10-14 PROCEDURE — 99214 OFFICE O/P EST MOD 30 MIN: CPT | Performed by: INTERNAL MEDICINE

## 2020-10-14 PROCEDURE — 90686 IIV4 VACC NO PRSV 0.5 ML IM: CPT | Performed by: INTERNAL MEDICINE

## 2020-10-14 PROCEDURE — G8482 FLU IMMUNIZE ORDER/ADMIN: HCPCS | Performed by: INTERNAL MEDICINE

## 2020-10-14 PROCEDURE — G8427 DOCREV CUR MEDS BY ELIG CLIN: HCPCS | Performed by: INTERNAL MEDICINE

## 2020-10-14 PROCEDURE — 2022F DILAT RTA XM EVC RTNOPTHY: CPT | Performed by: INTERNAL MEDICINE

## 2020-10-14 PROCEDURE — 3044F HG A1C LEVEL LT 7.0%: CPT | Performed by: INTERNAL MEDICINE

## 2020-10-14 PROCEDURE — 1036F TOBACCO NON-USER: CPT | Performed by: INTERNAL MEDICINE

## 2020-10-14 PROCEDURE — 3017F COLORECTAL CA SCREEN DOC REV: CPT | Performed by: INTERNAL MEDICINE

## 2020-10-14 RX ORDER — SOLIFENACIN SUCCINATE 10 MG/1
10 TABLET, FILM COATED ORAL DAILY
Qty: 30 TABLET | Refills: 5 | Status: SHIPPED | OUTPATIENT
Start: 2020-10-14 | End: 2020-12-09 | Stop reason: ALTCHOICE

## 2020-10-14 ASSESSMENT — ENCOUNTER SYMPTOMS
BLURRED VISION: 0
COUGH: 0
ORTHOPNEA: 0
SHORTNESS OF BREATH: 0

## 2020-10-14 NOTE — PROGRESS NOTES
Subjective:    cc:  Dm, htn, hld, cva, anxiety  Patient ID: Brock Hernandez is a 60 y.o. female. Hypertension   This is a chronic problem. The current episode started more than 1 year ago. The problem is unchanged. The problem is controlled. Pertinent negatives include no blurred vision, chest pain, headaches, orthopnea, palpitations, peripheral edema, PND, shortness of breath or sweats. There are no associated agents to hypertension. Risk factors for coronary artery disease include diabetes mellitus, dyslipidemia, obesity, stress, sedentary lifestyle and family history. Past treatments include diuretics and ACE inhibitors. The current treatment provides significant improvement. Compliance problems include exercise and diet. Diabetes   She presents for her follow-up diabetic visit. She has type 2 diabetes mellitus. Her disease course has been stable. There are no hypoglycemic associated symptoms. Pertinent negatives for hypoglycemia include no dizziness, headaches, seizures or sweats. Associated symptoms include weakness. Pertinent negatives for diabetes include no blurred vision, no chest pain, no fatigue, no polydipsia, no polyphagia and no polyuria. There are no hypoglycemic complications. Symptoms are stable. There are no diabetic complications. Risk factors for coronary artery disease include diabetes mellitus, dyslipidemia, hypertension, family history, sedentary lifestyle, stress and obesity. Current diabetic treatment includes insulin injections. She is compliant with treatment all of the time. Her weight is fluctuating minimally. She is following a diabetic diet. Meal planning includes avoidance of concentrated sweets. She has not had a previous visit with a dietitian. She never participates in exercise. Her home blood glucose trend is fluctuating minimally. Her overall blood glucose range is 110-130 mg/dl. An ACE inhibitor/angiotensin II receptor blocker is being taken.  She does not see a podiatrist.Eye exam is current. Hyperlipidemia   This is a chronic problem. The current episode started more than 1 year ago. The problem is controlled. Recent lipid tests were reviewed and are normal. Exacerbating diseases include diabetes and obesity. There are no known factors aggravating her hyperlipidemia. Pertinent negatives include no chest pain, focal sensory loss, focal weakness, leg pain, myalgias or shortness of breath. Current antihyperlipidemic treatment includes statins. The current treatment provides significant improvement of lipids. Compliance problems include adherence to exercise. Risk factors for coronary artery disease include diabetes mellitus, dyslipidemia, hypertension, obesity, family history, a sedentary lifestyle, post-menopausal and stress. Anxiety:  Chronic problem x yrs. Only intermittently. Rarely using but asking for rf. Last fill 3/2019. Still has 5 pills left. Was supposed to see Frances for anxiety and depression. Somehow did not get appt. Sp cva:  Right sided weakness chronically. C/o right knee pain:  Chronic problem x months. Was seen by ortho and given injection. Did home PT with good improvement but no longer doing exercises. Now with worsening pain. C/o frequent urination. Wetting bed at night. On ditropan with no real improvement. Review of Systems   Constitutional: Negative for appetite change, fatigue, fever and unexpected weight change. Eyes: Negative for blurred vision. Respiratory: Negative for cough and shortness of breath. Cardiovascular: Negative for chest pain, palpitations, orthopnea, leg swelling and PND. Endocrine: Negative for polydipsia, polyphagia and polyuria. Musculoskeletal: Positive for arthralgias and gait problem. Negative for myalgias. Neurological: Positive for weakness. Negative for dizziness, focal weakness, seizures, syncope, light-headedness, numbness and headaches.      Prior to Visit Medications    Medication Sig Taking? Authorizing Provider   Insulin Syringe-Needle U-100 (BD INSULIN SYRINGE U/F) 31G X 5/16\" 0.5 ML MISC USE THREE TIMES A DAY WITH INSULIN  HENRRY Garcia MD   oxybutynin (DITROPAN-XL) 10 MG extended release tablet Take 1 tablet by mouth daily  Emi Marcano MD   insulin glargine (LANTUS) 100 UNIT/ML injection vial INJECT UNDER THE SKIN 80 UNITS EVERY NIGHT AT BEDTIME  Emi Marcano MD   amLODIPine (NORVASC) 5 MG tablet Take 1 tablet by mouth daily  Emi Marcano MD   lisinopril-hydroCHLOROthiazide (PRINZIDE;ZESTORETIC) 20-12.5 MG per tablet Take 1 tablet by mouth 2 times daily  Emi Marcano MD   atorvastatin (LIPITOR) 20 MG tablet TAKE ONE TABLET BY MOUTH DAILY  HENRRY Garcia MD   ACCU-CHEK MOOSE PLUS strip USE TO TEST BLOOD SUGAR TWO TIMES A DAY AS NEEDED  HENRRY Garcia MD   NEXIUM 40 MG delayed release capsule TAKE ONE CAPSULE BY MOUTH DAILY BEFORE LUNCH  Emi Marcano MD   potassium chloride (KLOR-CON M) 20 MEQ extended release tablet TAKE ONE TABLET BY MOUTH DAILY  HENRRY Garcia MD   furosemide (LASIX) 40 MG tablet TAKE ONE TABLET BY MOUTH DAILY  HENRRY Garcia MD   ACCU-CHEK SOFTCLIX LANCETS MISC USE TO TEST BLOOD SUGAR TWO TIMES A DAY  Katie Montano [place], APRN - CNP   betamethasone dipropionate (DIPROLENE) 0.05 % ointment Apply topically daily. Marybel Landrum MD   Blood Glucose Monitoring Suppl (ACCU-CHEK MOOSE CONNECT) W/DEVICE KIT 1 kit by Does not apply route daily  Marybel Landrum MD       Allergies   Allergen Reactions    Ampicillin     Penicillins        /71 (Site: Left Upper Arm, Position: Sitting, Cuff Size: Large Adult)   Pulse 74   Temp 98.1 °F (36.7 °C)   Resp 16   Wt 207 lb (93.9 kg)   SpO2 96%   BMI 37.86 kg/m²     Objective:   Physical Exam  Constitutional:       General: She is not in acute distress. Appearance: Normal appearance. She is well-developed. She is obese. She is not ill-appearing.    Neck: Vascular: No JVD. Comments: No bruits  Cardiovascular:      Rate and Rhythm: Normal rate and regular rhythm. Heart sounds: Normal heart sounds. No murmur. No friction rub. No gallop. Pulmonary:      Effort: Pulmonary effort is normal. No respiratory distress. Breath sounds: Normal breath sounds. No wheezing, rhonchi or rales. Musculoskeletal:         General: No swelling. Skin:     General: Skin is warm and dry. Neurological:      Mental Status: She is alert and oriented to person, place, and time. Mental status is at baseline. Motor: Weakness present. Comments: r arm and leg weakness. Psychiatric:         Mood and Affect: Mood normal.         Behavior: Behavior normal.         Thought Content: Thought content normal.         Judgment: Judgment normal.         Assessment:      1. Type 2 diabetes mellitus without complication, with long-term current use of insulin ([address]    2. Essential hypertension    3. Mixed hyperlipidemia    4. Anxiety    5. Cerebrovascular accident (CVA) due to embolism of middle cerebral artery, unspecified blood vessel laterality ([address]    6. Acute pain of right knee    7. Urinary frequency            Plan:          Alisia Barksdale was seen today for diabetes. Diagnoses and all orders for this visit:    Type 2 diabetes mellitus without complication, with long-term current use of insulin ([address]):  Stable, cont same meds  -     POCT glycosylated hemoglobin (Hb A1C)  -     Microalbumin / Creatinine Urine Ratio; Future    Essential hypertension:  Stable, cont same meds    Mixed hyperlipidemia:  Stable, cont same meds    Anxiety: To aremntrout    Cerebrovascular accident (CVA) due to embolism of middle cerebral artery, unspecified blood vessel laterality ([address]):  Stable, cont same meds    Acute pain of right knee:  Restart PT exercises    Urinary frequency:  Trial of vesicare  -     solifenacin (VESICARE) 10 MG tablet;  Take 1 tablet by mouth daily    Other orders  - INFLUENZA, QUADV, 3 YRS AND OLDER, IM PF, PREFILL SYR OR SDV, 0.5ML (MINA Rodriguez)      3 months          Kathryn Guallpa MD

## 2020-10-14 NOTE — PROGRESS NOTES
Vaccine Information Sheet, \"Influenza - Inactivated\"  given to Chao Kee, or parent/legal guardian of  Chao Kee and verbalized understanding. Patient responses:    Have you ever had a reaction to a flu vaccine? No  Do you have any current illness? No  Have you ever had Guillian Callaway Syndrome? No  Do you have a serious allergy to any of the follow: Neomycin, Polymyxin, Thimerosal, eggs or egg products? No    Flu vaccine given per order. Please see immunization tab. Risks and benefits explained. Current VIS given.

## 2020-10-23 LAB
CREATININE URINE: 43.8 MG/DL (ref 28–259)
MICROALBUMIN UR-MCNC: 1.7 MG/DL
MICROALBUMIN/CREAT UR-RTO: 38.8 MG/G (ref 0–30)

## 2020-10-26 ENCOUNTER — TELEPHONE (OUTPATIENT)
Dept: ORTHOPEDIC SURGERY | Age: 65
End: 2020-10-26

## 2020-10-26 NOTE — TELEPHONE ENCOUNTER
Patient sister called asking if mitul can have another injection. I told her that she received one on 8/14/20 so we need to wait until November.  She made an appt for November 13th

## 2020-10-27 ENCOUNTER — TELEPHONE (OUTPATIENT)
Dept: INTERNAL MEDICINE CLINIC | Age: 65
End: 2020-10-27

## 2020-10-27 NOTE — TELEPHONE ENCOUNTER
----- Message from Merritt Loni sent at 10/26/2020  5:58 PM EDT -----  Subject: Results Request    QUESTIONS  Which lab or imaging result is the patient calling about? Creatine Urine   Ratio  Which provider ordered the test? Carmella Mcfadden   At what location was the test performed? [facility]  Date the test was performed? 2020-10-23  Additional Information for Provider? Patient's sister   Maynor Mckeon is calling regarding the results of sister's urine test. Please   advise.   ---------------------------------------------------------------------------  --------------  CALL BACK INFO  What is the best way for the office to contact you? OK to leave message on   voicemail  Preferred Call Back Phone Number?  [phone]

## 2020-11-05 RX ORDER — FUROSEMIDE 40 MG/1
TABLET ORAL
Qty: 72 TABLET | Refills: 5 | Status: SHIPPED | OUTPATIENT
Start: 2020-11-05 | End: 2021-06-16

## 2020-11-05 NOTE — TELEPHONE ENCOUNTER
LAST OV [phone]  Future Appointments   Date Time Provider Yazan Willoughby   11/13/2020 10:00 AM Radu Tomlinson MD [address]

## 2020-11-13 ENCOUNTER — OFFICE VISIT (OUTPATIENT)
Dept: ORTHOPEDIC SURGERY | Age: 65
End: 2020-11-13
Payer: MEDICARE

## 2020-11-13 VITALS — HEIGHT: 62 IN | WEIGHT: 203 LBS | BODY MASS INDEX: 37.36 KG/M2 | TEMPERATURE: 97.5 F

## 2020-11-13 PROCEDURE — 20610 DRAIN/INJ JOINT/BURSA W/O US: CPT | Performed by: ORTHOPAEDIC SURGERY

## 2020-11-13 RX ORDER — LIDOCAINE HYDROCHLORIDE 10 MG/ML
2 INJECTION, SOLUTION INFILTRATION; PERINEURAL ONCE
Status: COMPLETED | OUTPATIENT
Start: 2020-11-13 | End: 2020-11-13

## 2020-11-13 RX ORDER — METHYLPREDNISOLONE ACETATE 40 MG/ML
80 INJECTION, SUSPENSION INTRA-ARTICULAR; INTRALESIONAL; INTRAMUSCULAR; SOFT TISSUE ONCE
Status: COMPLETED | OUTPATIENT
Start: 2020-11-13 | End: 2020-11-13

## 2020-11-13 RX ADMIN — LIDOCAINE HYDROCHLORIDE 2 ML: 10 INJECTION, SOLUTION INFILTRATION; PERINEURAL at 10:40

## 2020-11-13 RX ADMIN — METHYLPREDNISOLONE ACETATE 80 MG: 40 INJECTION, SUSPENSION INTRA-ARTICULAR; INTRALESIONAL; INTRAMUSCULAR; SOFT TISSUE at 10:40

## 2020-11-13 NOTE — PATIENT INSTRUCTIONS
Impression:   1. Severe osteoarthritis of right knee with valgus deformity. 2.  Mentally disabled individual.      Plan/Treatment:   1. Injection with cortisone was recommended into the  RT  knee. After discussing potential risks and benefits she agreed. The injection site was cleaned with alcohol, anesthetized with 2 cc of 1% Lidoocaine then injected with 2 cc of 40 mg Depomedrol intraarticular after sufficient time for analgesia was allowed. 2.  Continue use of a cane for safety. 3.  Return here as needed.       Alonzo Faulkner MD  11/13/2020

## 2020-12-09 ENCOUNTER — TELEPHONE (OUTPATIENT)
Dept: INTERNAL MEDICINE CLINIC | Age: 65
End: 2020-12-09

## 2020-12-09 RX ORDER — OXYBUTYNIN CHLORIDE 10 MG/1
10 TABLET, EXTENDED RELEASE ORAL DAILY
Qty: 30 TABLET | Refills: 3 | Status: SHIPPED | OUTPATIENT
Start: 2020-12-09 | End: 2021-06-21

## 2020-12-09 NOTE — TELEPHONE ENCOUNTER
Bella Rae is requesting another rx for urinary incontinence. The one she has is not working.     Abel Alanis

## 2020-12-22 ENCOUNTER — TELEPHONE (OUTPATIENT)
Dept: INTERNAL MEDICINE CLINIC | Age: 65
End: 2020-12-22

## 2020-12-22 NOTE — TELEPHONE ENCOUNTER
FAX RECEIVED FROM [employer] REQUESTING PRIOR AUTH FROM NEXIUM 40 MG    [id number] - PA Case ID: [id number]       Outcome   N/Atoday   This case has been cancelled by the provider because a new request will be initiated for Generic equivalent      SPOKE TO [address] [place] [address],[address] DISPENSED AS GENERIC AND NO LONGER NEEDED. PHONE CALL COMPLETED.

## 2020-12-29 RX ORDER — LANCETS
EACH MISCELLANEOUS
Qty: 100 EACH | Refills: 5 | Status: SHIPPED | OUTPATIENT
Start: 2020-12-29

## 2021-01-25 ENCOUNTER — OFFICE VISIT (OUTPATIENT)
Dept: INTERNAL MEDICINE CLINIC | Age: 66
End: 2021-01-25
Payer: MEDICARE

## 2021-01-25 VITALS
DIASTOLIC BLOOD PRESSURE: 80 MMHG | TEMPERATURE: 98.1 F | WEIGHT: 199 LBS | RESPIRATION RATE: 18 BRPM | BODY MASS INDEX: 36.4 KG/M2 | SYSTOLIC BLOOD PRESSURE: 110 MMHG | OXYGEN SATURATION: 99 % | HEART RATE: 81 BPM

## 2021-01-25 DIAGNOSIS — E78.2 MIXED HYPERLIPIDEMIA: ICD-10-CM

## 2021-01-25 DIAGNOSIS — E11.9 TYPE 2 DIABETES MELLITUS WITHOUT COMPLICATION, WITH LONG-TERM CURRENT USE OF INSULIN (HCC): Primary | ICD-10-CM

## 2021-01-25 DIAGNOSIS — Z79.4 TYPE 2 DIABETES MELLITUS WITHOUT COMPLICATION, WITH LONG-TERM CURRENT USE OF INSULIN (HCC): Primary | ICD-10-CM

## 2021-01-25 DIAGNOSIS — F41.9 ANXIETY: ICD-10-CM

## 2021-01-25 DIAGNOSIS — I63.419 CEREBROVASCULAR ACCIDENT (CVA) DUE TO EMBOLISM OF MIDDLE CEREBRAL ARTERY, UNSPECIFIED BLOOD VESSEL LATERALITY (HCC): ICD-10-CM

## 2021-01-25 DIAGNOSIS — I10 ESSENTIAL HYPERTENSION: ICD-10-CM

## 2021-01-25 DIAGNOSIS — R35.0 URINARY FREQUENCY: ICD-10-CM

## 2021-01-25 DIAGNOSIS — K59.00 CONSTIPATION, UNSPECIFIED CONSTIPATION TYPE: ICD-10-CM

## 2021-01-25 LAB
A/G RATIO: 1.6 (ref 1.1–2.2)
ALBUMIN SERPL-MCNC: 3.8 G/DL (ref 3.4–5)
ALP BLD-CCNC: 45 U/L (ref 40–129)
ALT SERPL-CCNC: 16 U/L (ref 10–40)
ANION GAP SERPL CALCULATED.3IONS-SCNC: 12 MMOL/L (ref 3–16)
AST SERPL-CCNC: 13 U/L (ref 15–37)
BILIRUB SERPL-MCNC: 0.3 MG/DL (ref 0–1)
BUN BLDV-MCNC: 33 MG/DL (ref 7–20)
CALCIUM SERPL-MCNC: 9.2 MG/DL (ref 8.3–10.6)
CHLORIDE BLD-SCNC: 104 MMOL/L (ref 99–110)
CHOLESTEROL, TOTAL: 145 MG/DL (ref 0–199)
CO2: 24 MMOL/L (ref 21–32)
CREAT SERPL-MCNC: 1.4 MG/DL (ref 0.6–1.2)
GFR AFRICAN AMERICAN: 46
GFR NON-AFRICAN AMERICAN: 38
GLOBULIN: 2.4 G/DL
GLUCOSE BLD-MCNC: 71 MG/DL (ref 70–99)
HBA1C MFR BLD: 5.9 %
HDLC SERPL-MCNC: 36 MG/DL (ref 40–60)
LDL CHOLESTEROL CALCULATED: 85 MG/DL
POTASSIUM SERPL-SCNC: 3.8 MMOL/L (ref 3.5–5.1)
SODIUM BLD-SCNC: 140 MMOL/L (ref 136–145)
TOTAL PROTEIN: 6.2 G/DL (ref 6.4–8.2)
TRIGL SERPL-MCNC: 121 MG/DL (ref 0–150)
VLDLC SERPL CALC-MCNC: 24 MG/DL

## 2021-01-25 PROCEDURE — 99214 OFFICE O/P EST MOD 30 MIN: CPT | Performed by: INTERNAL MEDICINE

## 2021-01-25 PROCEDURE — 36415 COLL VENOUS BLD VENIPUNCTURE: CPT | Performed by: INTERNAL MEDICINE

## 2021-01-25 PROCEDURE — 1036F TOBACCO NON-USER: CPT | Performed by: INTERNAL MEDICINE

## 2021-01-25 PROCEDURE — 3017F COLORECTAL CA SCREEN DOC REV: CPT | Performed by: INTERNAL MEDICINE

## 2021-01-25 PROCEDURE — 2022F DILAT RTA XM EVC RTNOPTHY: CPT | Performed by: INTERNAL MEDICINE

## 2021-01-25 PROCEDURE — G8427 DOCREV CUR MEDS BY ELIG CLIN: HCPCS | Performed by: INTERNAL MEDICINE

## 2021-01-25 PROCEDURE — G8482 FLU IMMUNIZE ORDER/ADMIN: HCPCS | Performed by: INTERNAL MEDICINE

## 2021-01-25 PROCEDURE — G8417 CALC BMI ABV UP PARAM F/U: HCPCS | Performed by: INTERNAL MEDICINE

## 2021-01-25 PROCEDURE — 83036 HEMOGLOBIN GLYCOSYLATED A1C: CPT | Performed by: INTERNAL MEDICINE

## 2021-01-25 PROCEDURE — 3046F HEMOGLOBIN A1C LEVEL >9.0%: CPT | Performed by: INTERNAL MEDICINE

## 2021-01-25 RX ORDER — POLYETHYLENE GLYCOL 3350 17 G/17G
17 POWDER, FOR SOLUTION ORAL DAILY
Qty: 1530 G | Refills: 5 | Status: SHIPPED | OUTPATIENT
Start: 2021-01-25 | End: 2021-02-24

## 2021-01-25 ASSESSMENT — ENCOUNTER SYMPTOMS
COUGH: 0
BLURRED VISION: 0
ORTHOPNEA: 0
SHORTNESS OF BREATH: 0

## 2021-01-25 NOTE — PROGRESS NOTES
Subjective:    cc:  Dm, htn, hld, cva, anxiety  Patient ID: Sarina Coffey is a 60 y.o. female. Hypertension  This is a chronic problem. The current episode started more than 1 year ago. The problem is unchanged. The problem is controlled. Pertinent negatives include no blurred vision, chest pain, headaches, orthopnea, palpitations, peripheral edema, PND, shortness of breath or sweats. There are no associated agents to hypertension. Risk factors for coronary artery disease include diabetes mellitus, dyslipidemia, obesity, stress, sedentary lifestyle and family history. Past treatments include diuretics and ACE inhibitors. The current treatment provides significant improvement. Compliance problems include exercise and diet. Diabetes  She presents for her follow-up diabetic visit. She has type 2 diabetes mellitus. Her disease course has been stable. There are no hypoglycemic associated symptoms. Pertinent negatives for hypoglycemia include no dizziness, headaches, seizures or sweats. Associated symptoms include weakness. Pertinent negatives for diabetes include no blurred vision, no chest pain, no fatigue, no polydipsia, no polyphagia and no polyuria. There are no hypoglycemic complications. Symptoms are stable. There are no diabetic complications. Risk factors for coronary artery disease include diabetes mellitus, dyslipidemia, hypertension, family history, sedentary lifestyle, stress and obesity. Current diabetic treatment includes insulin injections. She is compliant with treatment all of the time. Her weight is decreasing steadily. She is following a diabetic diet. Meal planning includes avoidance of concentrated sweets. She has not had a previous visit with a dietitian. She never participates in exercise. Her home blood glucose trend is fluctuating minimally. Her overall blood glucose range is 110-130 mg/dl. An ACE inhibitor/angiotensin II receptor blocker is being taken.  She does not see a podiatrist.Eye exam is current. Hyperlipidemia  This is a chronic problem. The current episode started more than 1 year ago. The problem is controlled. Recent lipid tests were reviewed and are normal. Exacerbating diseases include diabetes and obesity. There are no known factors aggravating her hyperlipidemia. Pertinent negatives include no chest pain, focal sensory loss, focal weakness, leg pain, myalgias or shortness of breath. Current antihyperlipidemic treatment includes statins. The current treatment provides significant improvement of lipids. Compliance problems include adherence to exercise. Risk factors for coronary artery disease include diabetes mellitus, dyslipidemia, hypertension, obesity, family history, a sedentary lifestyle, post-menopausal and stress. Anxiety:  Chronic problem x yrs. Only intermittently. Rarely using but asking for rf. Last fill 3/2019. Still has 5 pills left. Was supposed to see Frances for anxiety and depression. Somehow did not get appt. Sp cva:  Right sided weakness chronically. C/o right knee pain:  Chronic problem x months. Was seen by ortho and given injection. Did home PT with good improvement but no longer doing exercises. Now with worsening pain. C/o frequent urination. Wetting bed at night. On ditropan with no real improvement. Episode of nausea and vomiting last week after eating. Emesis resolved. Continues with burning in chest.  ++ constipation. Only having bm 1 x wkly      Review of Systems   Constitutional: Negative for appetite change, fatigue, fever and unexpected weight change. Eyes: Negative for blurred vision. Respiratory: Negative for cough and shortness of breath. Cardiovascular: Negative for chest pain, palpitations, orthopnea, leg swelling and PND. Endocrine: Negative for polydipsia, polyphagia and polyuria. Musculoskeletal: Positive for arthralgias and gait problem. Negative for myalgias.    Neurological: Positive for She is not in acute distress. Appearance: Normal appearance. She is well-developed. She is obese. She is not ill-appearing. Neck:      Vascular: No JVD. Comments: No bruits  Cardiovascular:      Rate and Rhythm: Normal rate and regular rhythm. Heart sounds: Normal heart sounds. No murmur. No friction rub. No gallop. Pulmonary:      Effort: Pulmonary effort is normal. No respiratory distress. Breath sounds: Normal breath sounds. No wheezing, rhonchi or rales. Musculoskeletal:         General: No swelling. Right lower leg: No edema. Left lower leg: No edema. Skin:     General: Skin is warm and dry. Neurological:      Mental Status: She is alert and oriented to person, place, and time. Mental status is at baseline. Motor: Weakness present. Comments: Baseline r arm and leg weakness. Psychiatric:         Mood and Affect: Mood normal.         Behavior: Behavior normal.         Thought Content: Thought content normal.         Assessment:      1. Type 2 diabetes mellitus without complication, with long-term current use of insulin ([address]    2. Essential hypertension    3. Mixed hyperlipidemia    4. Anxiety    5. Cerebrovascular accident (CVA) due to embolism of middle cerebral artery, unspecified blood vessel laterality ([address]    6. Urinary frequency    7. Constipation, unspecified constipation type            Plan:            Julian Benjamin was seen today for diabetes. Diagnoses and all orders for this visit:    Type 2 diabetes mellitus without complication, with long-term current use of insulin ([address]):  Stable, cont same meds  -     Comprehensive Metabolic Panel; Future  -     Lipid Panel; Future  -     POCT glycosylated hemoglobin (Hb A1C)    Essential hypertension:  Stable, cont same meds  -     Comprehensive Metabolic Panel; Future    Mixed hyperlipidemia:  Check labs, adjust med prn  -     Comprehensive Metabolic Panel; Future  -     Lipid Panel;  Future    Anxiety:  Stable, cont same meds    Cerebrovascular accident (CVA) due to embolism of middle cerebral artery, unspecified blood vessel laterality ([facility]):  Stable, cont same meds  -     Comprehensive Metabolic Panel; Future  -     Lipid Panel; Future  -     POCT glycosylated hemoglobin (Hb A1C)    Urinary frequency:  Stable, cont same meds    Constipation, unspecified constipation type:  Trial of miralax. -     polyethylene glycol (GLYCOLAX) 17 GM/SCOOP powder;  Take 17 g by mouth daily      3 months          Nolon Gowers, MD

## 2021-01-27 DIAGNOSIS — E86.0 DEHYDRATION: ICD-10-CM

## 2021-01-27 DIAGNOSIS — I10 ESSENTIAL HYPERTENSION: Primary | Chronic | ICD-10-CM

## 2021-02-17 ENCOUNTER — TELEPHONE (OUTPATIENT)
Dept: INTERNAL MEDICINE CLINIC | Age: 66
End: 2021-02-17

## 2021-02-17 DIAGNOSIS — Z79.4 TYPE 2 DIABETES MELLITUS WITHOUT COMPLICATION, WITH LONG-TERM CURRENT USE OF INSULIN (HCC): Primary | ICD-10-CM

## 2021-02-17 DIAGNOSIS — E11.9 TYPE 2 DIABETES MELLITUS WITHOUT COMPLICATION, WITH LONG-TERM CURRENT USE OF INSULIN (HCC): Primary | ICD-10-CM

## 2021-02-17 NOTE — TELEPHONE ENCOUNTER
Merary Victor, patient's sister called. She can't find [employer] test strips or Lancets. Insurance won't pay for more b/c it is way too early. She is going to [facility] to buy a Glucometer kit.

## 2021-02-18 NOTE — TELEPHONE ENCOUNTER
Referral info for patient if they call back:    [address] [facility]   [address], [place], [address] [facility]   Ph: [phone]

## 2021-02-19 ENCOUNTER — TELEPHONE (OUTPATIENT)
Dept: PRIMARY CARE CLINIC | Age: 66
End: 2021-02-19

## 2021-02-19 NOTE — TELEPHONE ENCOUNTER
SW Contact to sister \"Asif\" who states patient has what she needs after being unable to locate diabetic supplies. Sister paid for them out of pocket at [facility].

## 2021-03-10 ENCOUNTER — NURSE ONLY (OUTPATIENT)
Dept: INTERNAL MEDICINE CLINIC | Age: 66
End: 2021-03-10
Payer: MEDICAID

## 2021-03-10 DIAGNOSIS — I10 ESSENTIAL HYPERTENSION: Chronic | ICD-10-CM

## 2021-03-10 DIAGNOSIS — E86.0 DEHYDRATION: ICD-10-CM

## 2021-03-10 LAB
ANION GAP SERPL CALCULATED.3IONS-SCNC: 13 MMOL/L (ref 3–16)
BUN BLDV-MCNC: 15 MG/DL (ref 7–20)
CALCIUM SERPL-MCNC: 9.5 MG/DL (ref 8.3–10.6)
CHLORIDE BLD-SCNC: 105 MMOL/L (ref 99–110)
CO2: 22 MMOL/L (ref 21–32)
CREAT SERPL-MCNC: 1.1 MG/DL (ref 0.6–1.2)
GFR AFRICAN AMERICAN: >60
GFR NON-AFRICAN AMERICAN: 50
GLUCOSE BLD-MCNC: 83 MG/DL (ref 70–99)
POTASSIUM SERPL-SCNC: 4.1 MMOL/L (ref 3.5–5.1)
SODIUM BLD-SCNC: 140 MMOL/L (ref 136–145)

## 2021-03-10 PROCEDURE — 36415 COLL VENOUS BLD VENIPUNCTURE: CPT | Performed by: INTERNAL MEDICINE

## 2021-03-10 PROCEDURE — 99999 PR OFFICE/OUTPT VISIT,PROCEDURE ONLY: CPT | Performed by: INTERNAL MEDICINE

## 2021-03-17 ENCOUNTER — TELEPHONE (OUTPATIENT)
Dept: INTERNAL MEDICINE CLINIC | Age: 66
End: 2021-03-17

## 2021-03-20 DIAGNOSIS — I10 ESSENTIAL HYPERTENSION: ICD-10-CM

## 2021-03-22 RX ORDER — AMLODIPINE BESYLATE 5 MG/1
TABLET ORAL
Qty: 30 TABLET | Refills: 12 | Status: SHIPPED | OUTPATIENT
Start: 2021-03-22 | End: 2022-03-29 | Stop reason: SDUPTHER

## 2021-03-22 NOTE — TELEPHONE ENCOUNTER
Request amlodipine last filled 9/1/20                                  Last ov 1/25/21                                                                   Next ov 6/16/21

## 2021-04-02 ENCOUNTER — HOSPITAL ENCOUNTER (EMERGENCY)
Age: 66
Discharge: HOME | End: 2021-04-02
Attending: EMERGENCY MEDICINE
Payer: MEDICARE

## 2021-04-02 VITALS
TEMPERATURE: 97.8 F | SYSTOLIC BLOOD PRESSURE: 129 MMHG | OXYGEN SATURATION: 100 % | DIASTOLIC BLOOD PRESSURE: 71 MMHG | BODY MASS INDEX: 35.25 KG/M2 | RESPIRATION RATE: 16 BRPM | HEART RATE: 79 BPM | HEIGHT: 63 IN

## 2021-04-02 DIAGNOSIS — S61.259A INFECTED DOG BITE OF FINGER, INITIAL ENCOUNTER: Primary | ICD-10-CM

## 2021-04-02 DIAGNOSIS — L08.9 INFECTED DOG BITE OF FINGER, INITIAL ENCOUNTER: Primary | ICD-10-CM
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
## 2021-04-02 PROCEDURE — 90471 IMMUNIZATION ADMIN: CPT | Performed by: EMERGENCY MEDICINE

## 2021-04-02 PROCEDURE — 6370000000 HC RX 637 (ALT 250 FOR IP): Performed by: EMERGENCY MEDICINE

## 2021-04-02 PROCEDURE — 90715 TDAP VACCINE 7 YRS/> IM: CPT | Performed by: EMERGENCY MEDICINE

## 2021-04-02 PROCEDURE — 99283 EMERGENCY DEPT VISIT LOW MDM: CPT

## 2021-04-02 PROCEDURE — 6360000002 HC RX W HCPCS: Performed by: EMERGENCY MEDICINE

## 2021-04-02 PROCEDURE — 10060 I&D ABSCESS SIMPLE/SINGLE: CPT

## 2021-04-02 RX ORDER — DOXYCYCLINE 100 MG/1
100 TABLET ORAL 2 TIMES DAILY
Qty: 20 TABLET | Refills: 0 | Status: SHIPPED | OUTPATIENT
Start: 2021-04-02 | End: 2021-04-12

## 2021-04-02 RX ORDER — DOXYCYCLINE HYCLATE 100 MG
100 TABLET ORAL ONCE
Status: COMPLETED | OUTPATIENT
Start: 2021-04-02 | End: 2021-04-02

## 2021-04-02 RX ADMIN — TETANUS TOXOID, REDUCED DIPHTHERIA TOXOID AND ACELLULAR PERTUSSIS VACCINE, ADSORBED 0.5 ML: 5; 2.5; 8; 8; 2.5 SUSPENSION INTRAMUSCULAR at 12:49

## 2021-04-02 RX ADMIN — DOXYCYCLINE HYCLATE 100 MG: 100 TABLET, COATED ORAL at 12:49

## 2021-04-02 NOTE — ED NOTES
Fall risk screening completed. Fall risk bracelet applied to patient. Non-skid socks provided and placed on patient. The fall risk is indicated using  fall sign . Based on score, a bed alarm was not indicated. The call light is within the patient's reach, and instructions for use were provided. The bed is in the lowest position with wheels locked. The patient has been advised to notify staff, using the call light, if there is a need to get up or use restroom. The patient verbalized understanding of safety precautions and how to contact staff for assistance.  [phone]  [address]eta [address], RN  04/02/21 [id number]

## 2021-04-02 NOTE — ED NOTES
Neosporin applied to wound. Gauze bandage applied. AVS reviewed with patient. Verbalized understanding. AVS was printed and given to patients daughter.      Lizzy Ludwig RN  04/02/21 [phone]

## 2021-04-02 NOTE — ED PROVIDER NOTES
CHIEF COMPLAINT  Animal Bite (Left finger about 7 days ago. Now swollen and has pus.)      HISTORY OF PRESENT ILLNESS  Marco Antonio Mckeon is a 60 y.o. female who presents to the ED complaining of a dog bite to her left index finger that happened approximately 7 days ago. Patient states she was trying to give her dog medication and the dog bit her. Patient denies being seen after the initial injury. States she is noted some swelling of the tip of her left index finger and had some drainage coming from the wound yesterday. Denies any fevers or chills. No nausea or vomiting. No numbness or weakness. Patient is unsure of her last tetanus update. .   No other complaints, modifying factors or associated symptoms. Nursing notes reviewed. Past Medical History:   Diagnosis Date    Alcohol abuse     Anxiety     Chronic pain syndrome 6/24/2016    [facility] [address] [facility]     Depression     Essential hypertension     Hyperlipidemia     Hypertension     Mixed hyperlipidemia     Moderate episode of recurrent major depressive disorder ([facility] [facility]     Stroke ([facility] [address] [facility]     Type 2 diabetes mellitus without complication ([facility] [address]      No past surgical history on file.   Family History   Problem Relation Age of Onset    Other Mother         dementia    Other Father         parkinsons    Other Sister         chf    Colon Cancer Brother      Social History     Socioeconomic History    Marital status: Single     Spouse name: Not on file    Number of children: Not on file    Years of education: Not on file    Highest education level: Not on file   Occupational History    Occupation: disabled   Social Needs    Financial resource strain: Not hard at all   [place] insecurity     Worry: Never true     Inability: Never true   [employer] needs     Medical: No     Non-medical: No   Tobacco Use    Smoking status: Never Smoker    Smokeless tobacco: Never Used   Substance and Sexual Activity    Alcohol use: Not Currently    Drug use: No    Sexual activity: Never   Lifestyle    Physical activity     Days per week: Not on file     Minutes per session: Not on file    Stress: Not on file   Relationships    Social connections     Talks on phone: Not on file     Gets together: Not on file     Attends [employer] service: Not on file     Active member of club or organization: Not on file     Attends meetings of clubs or organizations: Not on file     Relationship status: Not on file    Intimate partner violence     Fear of current or ex partner: Not on file     Emotionally abused: Not on file     Physically abused: Not on file     Forced sexual activity: Not on file   Other Topics Concern    Not on file   Social History Narrative    Not on file     Current Facility-Administered Medications   Medication Dose Route Frequency Provider Last Rate Last Admin    Tetanus-Diphth-Acell Pertussis (BOOSTRIX) injection 0.5 mL  0.5 mL Intramuscular Once Vipul Navarrete MD        doxycycline hyclate (VIBRA-TABS) tablet 100 mg  100 mg Oral Once Vipul Navarrete MD         Current Outpatient Medications   Medication Sig Dispense Refill    amLODIPine (NORVASC) 5 MG tablet TAKE ONE TABLET BY MOUTH DAILY 30 tablet 12    Accu-Chek Softclix Lancets MISC USE TO TEST BLOOD SUGAR TWO TIMES A  each 5    oxybutynin (DITROPAN XL) 10 MG extended release tablet Take 1 tablet by mouth daily 30 tablet 3    furosemide (LASIX) 40 MG tablet TAKE ONE TABLET BY MOUTH DAILY (Patient not taking: Reported on 1/25/2021) 72 tablet 5    Insulin Syringe-Needle U-100 (BD INSULIN SYRINGE U/F) 31G X 5/16\" 0.5 ML MISC USE THREE TIMES A DAY WITH INSULIN 100 each 12    insulin glargine (LANTUS) 100 UNIT/ML injection vial INJECT UNDER THE SKIN 80 UNITS EVERY NIGHT AT BEDTIME 100 mL 5    lisinopril-hydroCHLOROthiazide (PRINZIDE;ZESTORETIC) 20-12.5 MG per tablet Take 1 tablet by mouth 2 times daily 60 tablet 12    atorvastatin (LIPITOR) 20 MG tablet TAKE ONE TABLET BY MOUTH DAILY 90 tablet 3    ACCU-CHEK MOOSE PLUS strip USE TO TEST BLOOD SUGAR TWO TIMES A DAY AS NEEDED 150 strip 12    NEXIUM 40 MG delayed release capsule TAKE ONE CAPSULE BY MOUTH DAILY BEFORE LUNCH 30 capsule 12    potassium chloride (KLOR-CON M) 20 MEQ extended release tablet TAKE ONE TABLET BY MOUTH DAILY 90 tablet 3    betamethasone dipropionate (DIPROLENE) 0.05 % ointment Apply topically daily. 60 g 3    Blood Glucose Monitoring Suppl (ACCU-CHEK MOOSE CONNECT) W/DEVICE KIT 1 kit by Does not apply route daily 1 kit 0     Allergies   Allergen Reactions    Ampicillin     Penicillins          REVIEW OF SYSTEMS  10 systems reviewed, pertinent positives per HPI otherwise noted to be negative    PHYSICAL EXAM  /71   Pulse 79   Temp 97.8 °F (36.6 °C) (Oral)   Resp 16   Ht 5' 3\" (1.6 m)   SpO2 100%   BMI 35.25 kg/m²      CONSTITUTIONAL: AOx4, cooperative with exam, afebrile   HEAD: normocephalic, atraumatic   EYES: PERRL, EOMI, anicteric sclera   ENT: Moist mucous membranes, uvula midline   NECK: Supple, symmetric, trachea midline   LUNGS: Bilateral breath sounds, CTAB, no rales/ronchi/wheezes   CARDIOVASCULAR: RRR, normal S1/S2, no m/r/g, 2+ pulses throughout   ABDOMEN: Soft, non-tender, non-distended, +BS   NEUROLOGIC:  MAEx4, 5/5 strength throughout; fine touch sensation intact throughout; normal gait; finger-to-nose testing normal;   MUSCULOSKELETAL: No clubbing, cyanosis or edema   SKIN: No rash, pallor or wounds on exposed surfaces         RADIOLOGY  X-RAYS:  I have reviewed radiologic plain film image(s). ALL OTHER NON-PLAIN FILM IMAGES SUCH AS CT, ULTRASOUND AND MRI HAVE BEEN READ BY THE RADIOLOGIST.   No orders to display          EKG INTERPRETATION  None    PROCEDURES  Incision/Drainage    Date/Time: 4/2/2021 12:37 PM  Performed by: Sd Quiroga MD  Authorized by: Sd Quiroga MD     Consent:     Consent obtained:  Verbal    Consent given by:  Patient    Risks discussed: Bleeding, damage to other organs, infection, incomplete drainage and pain    Alternatives discussed:  No treatment, observation and delayed treatment  Location:     Type:  Abscess    Size:  1 cm x 1 cm    Location:  Upper extremity    Upper extremity location:  Finger    Finger location:  L index finger  Pre-procedure details:     Skin preparation:  Chloraprep  Anesthesia (see MAR for exact dosages): Anesthesia method:  None (patient request)  Procedure type:     Complexity:  Simple  Procedure details:     Incision types:  Stab incision    Scalpel blade:  11    Wound management:  Irrigated with saline    Drainage:  Purulent and bloody    Drainage amount:  Scant    Wound treatment:  Wound left open    Packing materials:  None  Post-procedure details:     Patient tolerance of procedure: Tolerated well, no immediate complications          ED COURSE/MDM  Abscess, dog bite, infected dog bite  Patient seen and evaluated. History and physical as above. Nontoxic, afebrile. Patient with evidence of cellulitis and small abscess on the pad of her left index finger from dog bite that happened 7 days ago. With normal distal perfusion. Started on doxycycline for antibiotic coverage. Updated on tetanus today. The area of fluctuance on the pad of the finger was unroofed and a small amount of purulent bloody fluid was drained. Patient tolerated well. Discussed discharge with outpatient follow-up to hand surgery. Strict return instructions provided. All questions answered prior to discharge. Patient and family member at bedside agreeable care plan. I estimate there is LOW risk for  COMPARTMENT SYNDROME, NECROTIZING FASCIITIS, TENDON OR NEUROVASCULAR INJURY, or FOREIGN BODY, thus I consider the discharge disposition reasonable. Also, there is no evidence or peritonitis, sepsis, or toxicity.  Maria Eugenia Woods and I have discussed the diagnosis and risks, and we agree with discharging home to follow-up with their primary doctor. We also discussed returning to the Emergency Department immediately if new or worsening symptoms occur. We have discussed the symptoms which are most concerning (e.g., changing or worsening pain, fever, numbness, weakness, cool or painful digits) that necessitate immediate return. Patient was given scripts for the following medications. I counseled patient how to take these medications. New Prescriptions    No medications on file           CLINICAL IMPRESSION  1. Infected dog bite of finger, initial encounter        Blood pressure 129/71, pulse 79, temperature 97.8 °F (36.6 °C), temperature source Oral, resp. rate 16, height 5' 3\" (1.6 m), SpO2 100 %, not currently breastfeeding. DISPOSITION  Patient was discharged to home in good condition. Sonu Monday, MD  [address] [place] [address]  [phone]    Call today  For a follow up appointment. Disclaimer: All medical record entries made by [employer] dictation.       (Please note that this note was completed with a voice recognition program. Every attempt was made to edit the dictations, but inevitably there remain words that are mis-transcribed.)            Valeria Luna MD  04/02/21 [id number]

## 2021-04-06 ENCOUNTER — TELEPHONE (OUTPATIENT)
Dept: ORTHOPEDIC SURGERY | Age: 66
End: 2021-04-06

## 2021-04-09 ENCOUNTER — TELEPHONE (OUTPATIENT)
Dept: ORTHOPEDIC SURGERY | Age: 66
End: 2021-04-09

## 2021-04-13 ENCOUNTER — TELEPHONE (OUTPATIENT)
Dept: ORTHOPEDIC SURGERY | Age: 66
End: 2021-04-13

## 2021-05-10 ENCOUNTER — OFFICE VISIT (OUTPATIENT)
Dept: ORTHOPEDIC SURGERY | Age: 66
End: 2021-05-10
Payer: MEDICARE

## 2021-05-10 VITALS — WEIGHT: 199 LBS | BODY MASS INDEX: 35.26 KG/M2 | HEIGHT: 63 IN
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
## 2021-05-10 DIAGNOSIS — S61.259A DOG BITE OF FINGER, INITIAL ENCOUNTER: Primary | ICD-10-CM

## 2021-05-10 PROCEDURE — 99213 OFFICE O/P EST LOW 20 MIN: CPT | Performed by: ORTHOPAEDIC SURGERY

## 2021-05-10 PROCEDURE — G8417 CALC BMI ABV UP PARAM F/U: HCPCS | Performed by: ORTHOPAEDIC SURGERY

## 2021-05-10 PROCEDURE — G8427 DOCREV CUR MEDS BY ELIG CLIN: HCPCS | Performed by: ORTHOPAEDIC SURGERY

## 2021-05-10 NOTE — PROGRESS NOTES
Ms. Jossie Hanks is a 60 y.o. right handed woman  who is seen today in Hand Surgical Consultation at the request of Calixto Amado MD.    She is seen today regarding an dog bite injury to the left Index Finger \"about a month ago\". She reports having sustained a laceration to the Ulnar and Dorsal aspect of the  left Index Finger. At the time of injury, there was not substantial bleeding & there was not acute sensory loss. She was seen for Emergency evaluation elsewhere, radiographs were not obtained. By report, her skin injury was not repaired after the wound was thoroughly cleansed. The nail structures were not involved in the injury and did not require repair at the time. She reports moderate pain located in the Dorsal and Volar aspect of the Index Finger, no tenderness of the wrist or elbow. She notes today, no numbness in the Radial Digital Nerve and Ulnar Digital Nerve distribution of the Index Finger. Symptoms show no change over time. I have today reviewed with Jossie Hanks the clinically relevant, past medical history, medications, allergies,  family history, social history, and Review Of Systems & I have documented any details relevant to today's presenting complaints in my history above. Ms. Sharlene Hsu self-reported past medical history, medications, allergies,  family history, social history, and Review Of Systems have been scanned into the chart under the \"Media\" tab. Physical Exam:  Ms. Sharlene Hsu most recent vitals:  Vitals  Height: 5' 3\" (160 cm)  Weight: 199 lb (90.3 kg)    She is well nourished, oriented to person, place & time. She demonstrates appropriate mood and affect as well as normal gait and station. Skin: is intact to the left Index Finger. The nail plate is present. No other digits show sign of skin injury bilaterally.   There is moderate warmth, mild erythema, no drainage   Digital range of motion is limited by prior trauma or deformity & osteoarthritis on the Left, greater than Right. FDS function is Intact in the Index Finger, FDP function is Intact but with limited excursion in the Index Finger, common extensor function is Intact in the Index Finger. Thumb EPL Function is Intact, Thumb EPB Function is Intact, Thumb FPL Function is Intact. All other digits demonstrate intact tendon function and motion bilaterally. Wrist range of motion is without significant limitation bilaterally  There is no evidence of gross joint instability bilaterally. Muscular strength is clinically appropriate bilaterally, excepting the injured structures noted above. Sensation is present in the Radial Digital Nerve and Ulnar Digital Nerve distribution of the left Index Finger all other digits demonstrate normal sensation bilaterally. Vascular examination reveals normal and good color bilaterally. There is no acute ecchymosis in the injured digit(s). Swelling is moderate along the lenght  Index Finger, absent elsewhere bilaterally  Maximal pain is elicited with palpation of the Dorsal and Volar region of the Index Finger on the Left, normal on the Right. The base of the hand & wrist are not tender to palpation  Kanavel's Sign (for suppurative flexor tenosynovitis) is equivocal in the left Index Finger. The involved finger is not held at rest in a position of flexion, does not have palpable fullness along the flexor tendon sheath, may have pain to palpation of the flexor tendon sheath, & is not exceedingly painful with passive flexion and extension. Radiographic Evaluation:  Radiographs were obtained today (2 views of the left Index Finger). They demonstrate evidence of Middle Phalanx head/neck Osteomyelitis with bone erosion and disintegration. No overall malalignment    Impression:  Ms. Maria Eugenia Woods has sustained recent left Index Finger dog bite injury without associated nerve laceration, without associated tendon laceration.   She  presents requesting further treatment. Plan:  Ms. Jun Fishman is showing sign of established Middle Phalanx osteomyelitis. There is no indication for surgical treatment of her  condition at this time. As I do not have surgical treatment for her osteomyelitis, I will refer Ms. Jun Fishman for Infectious Disease for medical management. I have asked Ms. Jun Fishman  to feel free to contact me or schedule a follow-up appointment at any time that she feels the need for any further evaluation or treatment for her upper extremitiy condition. If she feels that she continues to be feeling and functioning well, she may choose not to seek any further follow-up or treatment at her discretion. I will remain available to continue her care at any time in the future.

## 2021-05-10 NOTE — PATIENT INSTRUCTIONS
Thank you for choosing [facility] Physicians for your Hand and Upper Extremity needs. If we can be of any further assistance to you, please do not hesitate to contact us.     Office Phone Number:  [phone]  or  [phone]

## 2021-06-03 RX ORDER — POTASSIUM CHLORIDE 1500 MG/1
TABLET, EXTENDED RELEASE ORAL
Qty: 90 TABLET | Refills: 3 | Status: SHIPPED | OUTPATIENT
Start: 2021-06-03 | End: 2021-06-16

## 2021-06-03 NOTE — TELEPHONE ENCOUNTER
LOV: 1/25/21    Future Appointments   Date Time Provider Yazan Fartun   6/11/2021  1:00 PM Tomas Booker, PhD Carlota Rousseau [facility]   6/16/2021 12:45 PM Verena Miguel MD [facility]   6/23/2021  9:00 AM Henrietta Jacobson MD [facility]

## 2021-06-11 ENCOUNTER — OFFICE VISIT (OUTPATIENT)
Dept: PSYCHOLOGY | Age: 66
End: 2021-06-11
Payer: MEDICARE

## 2021-06-11 DIAGNOSIS — F43.23 ADJUSTMENT DISORDER WITH MIXED ANXIETY AND DEPRESSED MOOD: Primary | ICD-10-CM

## 2021-06-11 PROCEDURE — 1036F TOBACCO NON-USER: CPT | Performed by: PSYCHOLOGIST

## 2021-06-11 PROCEDURE — 90791 PSYCH DIAGNOSTIC EVALUATION: CPT | Performed by: PSYCHOLOGIST

## 2021-06-14 ASSESSMENT — ANXIETY QUESTIONNAIRES
5. BEING SO RESTLESS THAT IT IS HARD TO SIT STILL: 0-NOT AT ALL
3. WORRYING TOO MUCH ABOUT DIFFERENT THINGS: 2-OVER HALF THE DAYS
7. FEELING AFRAID AS IF SOMETHING AWFUL MIGHT HAPPEN: 3-NEARLY EVERY DAY
GAD7 TOTAL SCORE: 14
1. FEELING NERVOUS, ANXIOUS, OR ON EDGE: 2-OVER HALF THE DAYS
6. BECOMING EASILY ANNOYED OR IRRITABLE: 3-NEARLY EVERY DAY
4. TROUBLE RELAXING: 2-OVER HALF THE DAYS
2. NOT BEING ABLE TO STOP OR CONTROL WORRYING: 2-OVER HALF THE DAYS

## 2021-06-14 ASSESSMENT — PATIENT HEALTH QUESTIONNAIRE - PHQ9
7. TROUBLE CONCENTRATING ON THINGS, SUCH AS READING THE NEWSPAPER OR WATCHING TELEVISION: 3
8. MOVING OR SPEAKING SO SLOWLY THAT OTHER PEOPLE COULD HAVE NOTICED. OR THE OPPOSITE, BEING SO FIGETY OR RESTLESS THAT YOU HAVE BEEN MOVING AROUND A LOT MORE THAN USUAL: 2
2. FEELING DOWN, DEPRESSED OR HOPELESS: 3
1. LITTLE INTEREST OR PLEASURE IN DOING THINGS: 1
9. THOUGHTS THAT YOU WOULD BE BETTER OFF DEAD, OR OF HURTING YOURSELF: 0
SUM OF ALL RESPONSES TO PHQ QUESTIONS 1-9: 17
SUM OF ALL RESPONSES TO PHQ QUESTIONS 1-9: 17
SUM OF ALL RESPONSES TO PHQ9 QUESTIONS 1 & 2: 4
5. POOR APPETITE OR OVEREATING: 2
3. TROUBLE FALLING OR STAYING ASLEEP: 2
4. FEELING TIRED OR HAVING LITTLE ENERGY: 2
6. FEELING BAD ABOUT YOURSELF - OR THAT YOU ARE A FAILURE OR HAVE LET YOURSELF OR YOUR FAMILY DOWN: 2
SUM OF ALL RESPONSES TO PHQ QUESTIONS 1-9: 17

## 2021-06-14 NOTE — PROGRESS NOTES
Behavioral Health Consultation  Denise Shaffer, Ph.D.  Psychologist  2021  1:00 PM EDT      Time spent with Patient: 30 minutes  This is patient's first [facility] appointment. Reason for Consult: depression; grief  Referring Provider: JARRETT Barboza [phone] [id number]    Feedback for PCP:     Pt provided informed consent for the behavioral health program. Discussed with patient model of service to include the limits of confidentiality (i.e. abuse reporting, suicide intervention, etc.) and short-term intervention focused approach. Pt indicated understanding. Feedback given to PCP. S:  Patient reports that her sister  last week and that they were very very close. She is frustrated and angry that her other sister did not tell her that the sister who passed was having serious health issues. The patient talked about her sister and seemed in shock to have lost such a close friend and relation.          Social History     Tobacco Use    Smoking status: Never Smoker    Smokeless tobacco: Never Used   Substance Use Topics    Alcohol use: Not Currently        Illicit drugs:   Social History     Substance and Sexual Activity   Drug Use No        O:  MSE:  Appearance: good hygiene   Attitude: cooperative and in grieving distress  Consciousness: alert  Orientation: oriented to person, place, time, general circumstance  Memory: recent and remote memory intact  Attention/Concentration: intact during session  Psychomotor Activity: normal  Eye Contact: normal  Speech: low volume and slow  Mood: sad  Affect: congruent  Perception: within normal limits  Thought Content: within normal limits  Thought Process: logical, coherent and goal-directed  Insight: good  Judgment: intact  Ability to understand instructions: Yes  Ability to respond meaningfully: Yes  Morbid Ideation: no   Suicide Assessment: no suicidal ideation, plan, or intent  Homicidal Ideation: no    A:  We talked about the grieving process and the patient seemed to take comfort in her knowing that her sister was in heaven and that this was out of her control, and so her part is to accept, have prasanth, and grieve. We talked about how the patient may make another appointment whenever she feels like she could use some support in her process. KIM 7 SCORE 6/14/2021   KIM-7 Total Score 14     Interpretation of KIM-7 score: 5-9 = mild anxiety, 10-14 = moderate anxiety, 15+ = severe anxiety. Recommend referral to behavioral health for scores 10 or greater. PHQ Scores 6/14/2021 6/9/2020 9/11/2019 3/1/2019 7/20/2017   PHQ2 Score 4 0 0 1 0   PHQ9 Score 17 0 0 1 0     Interpretation of Total Score Depression Severity: 1-4 = Minimal depression, 5-9 = Mild depression, 10-14 = Moderate depression, 15-19 = Moderately severe depression, 20-27 = Severe depression    Diagnosis:    1. Adjustment disorder with mixed anxiety and depressed mood        Patient Active Problem List   Diagnosis    Mixed hyperlipidemia    Essential hypertension    Stroke ([facility] [address]    Anxiety    Moderate episode of recurrent major depressive disorder (HCC)    Type 2 diabetes mellitus without complication (HCC)    Chronic pain syndrome    Diabetic nephropathy associated with type 2 diabetes mellitus ([facility] [address]         Plan:  Pt interventions:  Established rapport, [place]-setting to identify pt's primary goals for [facility] visit / overall health, Supportive techniques and Provided Psychoeducation re: the grieving process.        Pt Behavioral Change Plan:  Pt set the following goals:  Pt will schedule F/U visit as needed

## 2021-06-15 ENCOUNTER — CLINICAL DOCUMENTATION (OUTPATIENT)
Dept: OTHER | Age: 66
End: 2021-06-15

## 2021-06-16 ENCOUNTER — OFFICE VISIT (OUTPATIENT)
Dept: INTERNAL MEDICINE CLINIC | Age: 66
End: 2021-06-16
Payer: MEDICARE

## 2021-06-16 VITALS
OXYGEN SATURATION: 98 % | HEART RATE: 89 BPM | DIASTOLIC BLOOD PRESSURE: 60 MMHG | SYSTOLIC BLOOD PRESSURE: 108 MMHG | RESPIRATION RATE: 16 BRPM

## 2021-06-16 DIAGNOSIS — E78.2 MIXED HYPERLIPIDEMIA: ICD-10-CM

## 2021-06-16 DIAGNOSIS — R35.0 URINARY FREQUENCY: ICD-10-CM

## 2021-06-16 DIAGNOSIS — F51.04 PSYCHOPHYSIOLOGICAL INSOMNIA: ICD-10-CM

## 2021-06-16 DIAGNOSIS — I10 ESSENTIAL HYPERTENSION: ICD-10-CM

## 2021-06-16 DIAGNOSIS — Z79.4 TYPE 2 DIABETES MELLITUS WITHOUT COMPLICATION, WITH LONG-TERM CURRENT USE OF INSULIN (HCC): Primary | ICD-10-CM

## 2021-06-16 DIAGNOSIS — E11.9 TYPE 2 DIABETES MELLITUS WITHOUT COMPLICATION, WITH LONG-TERM CURRENT USE OF INSULIN (HCC): Primary | ICD-10-CM

## 2021-06-16 DIAGNOSIS — K59.00 CONSTIPATION, UNSPECIFIED CONSTIPATION TYPE: ICD-10-CM

## 2021-06-16 DIAGNOSIS — F32.9 REACTIVE DEPRESSION: ICD-10-CM

## 2021-06-16 DIAGNOSIS — I63.419 CEREBROVASCULAR ACCIDENT (CVA) DUE TO EMBOLISM OF MIDDLE CEREBRAL ARTERY, UNSPECIFIED BLOOD VESSEL LATERALITY (HCC): ICD-10-CM

## 2021-06-16 DIAGNOSIS — F41.9 ANXIETY: ICD-10-CM

## 2021-06-16 LAB — HBA1C MFR BLD: 5.9 %

## 2021-06-16 PROCEDURE — 3017F COLORECTAL CA SCREEN DOC REV: CPT | Performed by: INTERNAL MEDICINE

## 2021-06-16 PROCEDURE — G8417 CALC BMI ABV UP PARAM F/U: HCPCS | Performed by: INTERNAL MEDICINE

## 2021-06-16 PROCEDURE — 3044F HG A1C LEVEL LT 7.0%: CPT | Performed by: INTERNAL MEDICINE

## 2021-06-16 PROCEDURE — 1036F TOBACCO NON-USER: CPT | Performed by: INTERNAL MEDICINE

## 2021-06-16 PROCEDURE — 2022F DILAT RTA XM EVC RTNOPTHY: CPT | Performed by: INTERNAL MEDICINE

## 2021-06-16 PROCEDURE — G8427 DOCREV CUR MEDS BY ELIG CLIN: HCPCS | Performed by: INTERNAL MEDICINE

## 2021-06-16 PROCEDURE — 83036 HEMOGLOBIN GLYCOSYLATED A1C: CPT | Performed by: INTERNAL MEDICINE

## 2021-06-16 PROCEDURE — 99214 OFFICE O/P EST MOD 30 MIN: CPT | Performed by: INTERNAL MEDICINE

## 2021-06-16 RX ORDER — TRAZODONE HYDROCHLORIDE 50 MG/1
50 TABLET ORAL NIGHTLY
Qty: 30 TABLET | Refills: 0 | Status: SHIPPED | OUTPATIENT
Start: 2021-06-16 | End: 2022-04-19 | Stop reason: ALTCHOICE

## 2021-06-16 RX ORDER — INSULIN GLARGINE 100 [IU]/ML
INJECTION, SOLUTION SUBCUTANEOUS
Qty: 100 ML | Refills: 5 | Status: SHIPPED | OUTPATIENT
Start: 2021-06-16 | End: 2022-01-18 | Stop reason: SDUPTHER

## 2021-06-16 ASSESSMENT — ENCOUNTER SYMPTOMS
SHORTNESS OF BREATH: 0
BLURRED VISION: 0
ORTHOPNEA: 0
COUGH: 0

## 2021-06-16 NOTE — PROGRESS NOTES
Subjective:    cc:  Dm, htn, hld, cva, anxiety  Patient ID: Spencer Lynn is a 60 y.o. female. Hypertension  This is a chronic problem. The current episode started more than 1 year ago. The problem is unchanged. The problem is controlled. Pertinent negatives include no blurred vision, chest pain, headaches, orthopnea, palpitations, peripheral edema, PND, shortness of breath or sweats. There are no associated agents to hypertension. Risk factors for coronary artery disease include diabetes mellitus, dyslipidemia, obesity, stress, sedentary lifestyle and family history. Past treatments include diuretics and ACE inhibitors. The current treatment provides significant improvement. Compliance problems include exercise and diet. Diabetes  She presents for her follow-up diabetic visit. She has type 2 diabetes mellitus. Her disease course has been stable. There are no hypoglycemic associated symptoms. Pertinent negatives for hypoglycemia include no dizziness, headaches, seizures or sweats. Associated symptoms include weakness. Pertinent negatives for diabetes include no blurred vision, no chest pain, no fatigue, no polydipsia, no polyphagia and no polyuria. There are no hypoglycemic complications. Symptoms are stable. There are no diabetic complications. Risk factors for coronary artery disease include diabetes mellitus, dyslipidemia, hypertension, family history, sedentary lifestyle, stress and obesity. Current diabetic treatment includes insulin injections. She is compliant with treatment all of the time. Her weight is fluctuating minimally. She is following a diabetic diet. Meal planning includes avoidance of concentrated sweets. She has not had a previous visit with a dietitian. She never participates in exercise. Her home blood glucose trend is fluctuating minimally. Her breakfast blood glucose range is generally  mg/dl. Her dinner blood glucose range is generally 180-200 mg/dl.  Her overall blood glucose blurred vision. Respiratory: Negative for cough and shortness of breath. Cardiovascular: Negative for chest pain, palpitations, orthopnea, leg swelling and PND. Endocrine: Negative for polydipsia, polyphagia and polyuria. Musculoskeletal: Positive for arthralgias and gait problem. Negative for myalgias. Neurological: Positive for weakness. Negative for dizziness, focal weakness, seizures, syncope, light-headedness, numbness and headaches. Prior to Visit Medications    Medication Sig Taking? Authorizing Provider   insulin glargine (LANTUS) 100 UNIT/ML injection vial INJECT UNDER THE SKIN 60 UNITS EVERY NIGHT AT BEDTIME Yes Nova Guerra MD   amLODIPine (NORVASC) 5 MG tablet TAKE ONE TABLET BY MOUTH DAILY Yes Nova Guerra MD   Accu-Chek Softclix Lancets MISC USE TO TEST BLOOD SUGAR TWO TIMES A DAY Yes Nova Guerra MD   oxybutynin (DITROPAN XL) 10 MG extended release tablet Take 1 tablet by mouth daily Yes Nova Guerra MD   Insulin Syringe-Needle U-100 (BD INSULIN SYRINGE U/F) 31G X 5/16\" 0.5 ML MISC USE THREE TIMES A DAY WITH INSULIN Yes Nova Guerra MD   atorvastatin (LIPITOR) 20 MG tablet TAKE ONE TABLET BY MOUTH DAILY Yes Nova Guerra MD   ACCU-CHEK MOOSE PLUS strip USE TO TEST BLOOD SUGAR TWO TIMES A DAY AS NEEDED Yes Nova Guerra MD   NEXIUM 40 MG delayed release capsule TAKE ONE CAPSULE BY MOUTH DAILY BEFORE LUNCH Yes Nova Guerra MD   betamethasone dipropionate (DIPROLENE) 0.05 % ointment Apply topically daily.  Yes See Weiss MD   Blood Glucose Monitoring Suppl (ACCU-CHEK MOOSE CONNECT) W/DEVICE KIT 1 kit by Does not apply route daily Yes See Weiss MD   lisinopril-hydroCHLOROthiazide (PRINZIDE;ZESTORETIC) 20-12.5 MG per tablet Take 1 tablet by mouth 2 times daily  Nova Guerra MD       Allergies   Allergen Reactions    Ampicillin     Penicillins        /60 (Site: Right Upper Arm, Position: Sitting, Cuff Size: Medium Adult)   Pulse 89   Resp 16   SpO2 98%   Breastfeeding No     Objective:   Physical Exam  Constitutional:       General: She is not in acute distress. Appearance: Normal appearance. She is well-developed. She is obese. She is not ill-appearing. Neck:      Vascular: No JVD. Comments: No bruits  Cardiovascular:      Rate and Rhythm: Normal rate and regular rhythm. Heart sounds: Normal heart sounds. No murmur heard. No friction rub. No gallop. Pulmonary:      Effort: Pulmonary effort is normal. No respiratory distress. Breath sounds: Normal breath sounds. No wheezing, rhonchi or rales. Musculoskeletal:         General: No swelling. Right lower leg: No edema. Left lower leg: No edema. Skin:     General: Skin is warm and dry. Neurological:      Mental Status: She is alert and oriented to person, place, and time. Mental status is at baseline. Motor: Weakness present. Comments: Baseline r arm and leg weakness. Psychiatric:         Mood and Affect: Mood normal.         Behavior: Behavior normal.         Thought Content: Thought content normal.         Assessment:      1. Type 2 diabetes mellitus without complication, with long-term current use of insulin ([address]    2. Essential hypertension    3. Mixed hyperlipidemia    4. Anxiety    5. Cerebrovascular accident (CVA) due to embolism of middle cerebral artery, unspecified blood vessel laterality ([address]    6. Urinary frequency    7. Constipation, unspecified constipation type    8. Reactive depression    9. Psychophysiological insomnia            Plan:            Araceli Hicks was seen today for diabetes and hypertension. Diagnoses and all orders for this visit:    Type 2 diabetes mellitus without complication, with long-term current use of insulin ([address]):  Running frequent low bs.   Will reduce lantus to 60 hs as pt is inconsistent with meals.    -     POCT glycosylated hemoglobin (Hb A1C)    Essential hypertension:  Stable, cont same meds    Mixed hyperlipidemia:  Stable, cont same meds    Anxiety:  Refusing ssri currently. Will refer to [facility]    Cerebrovascular accident (CVA) due to embolism of middle cerebral artery, unspecified blood vessel laterality (HCC)    Urinary frequency:  Stable, cont same meds    Constipation, unspecified constipation type:  resolved    Depression:  Refusing ssri.   To [facility]    Insomnia:  Trial of trazodone    3 months          Aury Vivas MD

## 2021-06-21 RX ORDER — OXYBUTYNIN CHLORIDE 10 MG/1
TABLET, EXTENDED RELEASE ORAL
Qty: 90 TABLET | Refills: 3 | Status: SHIPPED | OUTPATIENT
Start: 2021-06-21 | End: 2022-06-15 | Stop reason: SDUPTHER

## 2021-06-22 ENCOUNTER — TELEPHONE (OUTPATIENT)
Dept: ORTHOPEDIC SURGERY | Age: 66
End: 2021-06-22

## 2021-06-22 NOTE — TELEPHONE ENCOUNTER
General Question     Subject: PT HAS AN APPT WITH INFECTIOUS DISEASE DR TOMORROW BUT THEY LOST THEIR PAPER, SO PT DOES NOT KNOW WHAT TIME APPT IS. THEY NEED THE PHONE NUMBER TO CALL.   Patient and /or Facility Request: [facility]  Contact Number: [phone]

## 2021-06-23 ENCOUNTER — OFFICE VISIT (OUTPATIENT)
Dept: INFECTIOUS DISEASES | Age: 66
End: 2021-06-23
Payer: MEDICARE

## 2021-06-23 DIAGNOSIS — M86.9 FINGER OSTEOMYELITIS, LEFT (HCC): Primary | ICD-10-CM

## 2021-06-23 DIAGNOSIS — M86.9 FINGER OSTEOMYELITIS, LEFT (HCC): ICD-10-CM

## 2021-06-23 LAB
C-REACTIVE PROTEIN: 7 MG/L (ref 0–5.1)
SEDIMENTATION RATE, ERYTHROCYTE: 25 MM/HR (ref 0–30)

## 2021-06-23 PROCEDURE — 99203 OFFICE O/P NEW LOW 30 MIN: CPT | Performed by: INTERNAL MEDICINE

## 2021-06-23 PROCEDURE — 3017F COLORECTAL CA SCREEN DOC REV: CPT | Performed by: INTERNAL MEDICINE

## 2021-06-23 PROCEDURE — G8417 CALC BMI ABV UP PARAM F/U: HCPCS | Performed by: INTERNAL MEDICINE

## 2021-06-23 PROCEDURE — G8427 DOCREV CUR MEDS BY ELIG CLIN: HCPCS | Performed by: INTERNAL MEDICINE

## 2021-06-23 PROCEDURE — 1036F TOBACCO NON-USER: CPT | Performed by: INTERNAL MEDICINE

## 2021-06-23 NOTE — PROGRESS NOTES
Infectious Diseases Clinic Consult Note       Reason for Consult: Left index finger infection    Requesting Physician:    Vel Moses MD   Primary Care Physician:  Lorie Hernandez MD  History Obtained From:   Patient , Medical Records     CHIEF COMPLAINT:    Chief Complaint   Patient presents with    New Patient     referred from Dr. Janis Booker office. had a dog bite on left index finger. now has osteomyelitis and was referred to us for treatment. unable to bend finger as much, no other complaints mentioned. HISTORY OF PRESENT ILLNESS: 73 y.o. female here for evaluation of possible osteomyelitis of the Left index finger after she sustained a dog bite ,it her own pet dog she was giving medication to the dog and it bit her on the index finger  and this happened in April 2021 and she was seen in ED on 4/2/21 per records. She required simple ID of the finger and per description it was bloody and purulent She was placed on oral abx Doxycycline and given Tetanus booster on that visit. She was referred to see Dr. Janis Booker on 5/10/21 and per his note there is a concern for osteomyelitis of the Left index finger middle phalanx and her main symptoms is difficulty with flexion of the index finger distal phalanx has some swelling but no open wound no drainage. She has DM on insulin not on any current antibiotic therapy.    Location : Left index finger        Quality :   Aching      Severity : 5/10     Duration : weeks    Timing :intermittent   Context :   H/o dog bite  Modifying factors : none   Associated signs and symptoms: no fevers no chills        Past Medical History:    Past Medical History:   Diagnosis Date    Alcohol abuse     Anxiety     Chronic pain syndrome 6/24/2016    [facility]     Depression     Essential hypertension     Hyperlipidemia     Hypertension     Mixed hyperlipidemia     Moderate episode of recurrent major depressive disorder ([facility]     Stroke ([facility]     Type 2 diabetes mellitus without complication [facility])        Past Surgical History:    No past surgical history on file. Current Medications:    Current Outpatient Medications   Medication Sig Dispense Refill    oxybutynin (DITROPAN-XL) 10 MG extended release tablet TAKE ONE TABLET BY MOUTH DAILY 90 tablet 3    insulin glargine (LANTUS) 100 UNIT/ML injection vial INJECT UNDER THE SKIN 60 UNITS EVERY NIGHT AT BEDTIME 100 mL 5    amLODIPine (NORVASC) 5 MG tablet TAKE ONE TABLET BY MOUTH DAILY 30 tablet 12    Accu-Chek Softclix Lancets MISC USE TO TEST BLOOD SUGAR TWO TIMES A  each 5    Insulin Syringe-Needle U-100 (BD INSULIN SYRINGE U/F) 31G X 5/16\" 0.5 ML MISC USE THREE TIMES A DAY WITH INSULIN 100 each 12    lisinopril-hydroCHLOROthiazide (PRINZIDE;ZESTORETIC) 20-12.5 MG per tablet Take 1 tablet by mouth 2 times daily 60 tablet 12    atorvastatin (LIPITOR) 20 MG tablet TAKE ONE TABLET BY MOUTH DAILY 90 tablet 3    ACCU-CHEK MOOSE PLUS strip USE TO TEST BLOOD SUGAR TWO TIMES A DAY AS NEEDED 150 strip 12    NEXIUM 40 MG delayed release capsule TAKE ONE CAPSULE BY MOUTH DAILY BEFORE LUNCH 30 capsule 12    betamethasone dipropionate (DIPROLENE) 0.05 % ointment Apply topically daily. 60 g 3    Blood Glucose Monitoring Suppl (ACCU-CHEK MOOSE CONNECT) W/DEVICE KIT 1 kit by Does not apply route daily 1 kit 0    traZODone (DESYREL) 50 MG tablet Take 1 tablet by mouth nightly (Patient not taking: Reported on 6/23/2021) 30 tablet 0     No current facility-administered medications for this visit.        Allergies:  Ampicillin and Penicillins      Immunizations :   Immunization History   Administered Date(s) Administered    Influenza Vaccine, unspecified formulation 11/13/2012    Influenza Virus Vaccine 09/24/2010, 11/13/2012, 09/16/2014, 10/02/2015    Influenza, Intradermal, Preservative free 12/06/2013    Influenza, Quadv, IM, PF (6 mo and older Fluzone, Flulaval, Fluarix, and 3 yrs and older Afluria) 09/26/2016, 12/03/2018, 09/05/2019, 10/14/2020    Pneumococcal Conjugate 13-valent ([id number]) 10/02/2015    Pneumococcal Conjugate 7-valent (Prevnar7) 12/03/2007    Pneumococcal Polysaccharide ([id number]) 01/09/2017    Tdap (Boostrix, Adacel) 04/02/2021         Social History:   Social History     Socioeconomic History    Marital status: Single     Spouse name: None    Number of children: None    Years of education: None    Highest education level: None   Occupational History    Occupation: disabled   Tobacco Use    Smoking status: Never Smoker    Smokeless tobacco: Never Used   Vaping Use    Vaping Use: Never used   Substance and Sexual Activity    Alcohol use: Not Currently    Drug use: No    Sexual activity: Never   Other Topics Concern    None   Social History Narrative    None     Social Determinants of Health     Financial Resource Strain: Low Risk     Difficulty of Paying Living Expenses: Not hard at all   Food Insecurity: No Food Insecurity    Worried About Running Out of Food in the Last Year: Never true    Tamara of Food in the Last Year: Never true   Transportation Needs: No Transportation Needs    Lack of Transportation (Medical): No    Lack of Transportation (Non-Medical):  No   Physical Activity:     Days of Exercise per Week:     Minutes of Exercise per Session:    Stress:     Feeling of Stress :    Social Connections:     Frequency of Communication with Friends and Family:     Frequency of Social Gatherings with Friends and Family:     Attends Oriental orthodox Services:     Active Member of Clubs or Organizations:     Attends Club or Organization Meetings:     Marital Status:    Intimate Partner Violence:     Fear of Current or Ex-Partner:     Emotionally Abused:     Physically Abused:     Sexually Abused:      Social History     Tobacco Use   Smoking Status Never Smoker   Smokeless Tobacco Never Used                Family History:   Family History   Problem Relation BLOODCULT2    Urine Culture: No results for input(s): Ishmael Morrissey in the last 72 hours. All pertinent results, images and reports for the current visit  were reviewed by me. IMPRESSION  Patient Active Problem List   Diagnosis    Mixed hyperlipidemia    Essential hypertension    [facility]    Anxiety    Moderate episode of recurrent major depressive disorder (HCC)    Type 2 diabetes mellitus without complication (HCC)    Chronic pain syndrome    Diabetic nephropathy associated with type 2 diabetes mellitus ([facility]        Diagnosis Orders   1. Finger osteomyelitis, left ([facility]  SEDIMENTATION RATE    C-REACTIVE PROTEIN     Suspected osteomyelitis of the LEFT index finger middle phalanx  following a Dog bite in April 2021, current exam there is some on going swelling but no drainage no open wound. Will check ESR, CRP and may need more advanced imaging to see if there is any on going evidence of deep infection to consider further abx therapy. If no abscess no tenosynovitis may be able to choose oral abx therapy if there is any evidence of deep infection we can consider IV abx therapy. Bed side exam indicates mild swelling and flexion deformity. Labs, Microbiology, Radiology and pertinent results from [facility] every where were reviewed as a part of the consultation. RECOMMENDATIONS:    1. Check ESR, CRP  2. May be able to choose oral abx if necessary   3. If markers not much elevated may consider further imaging to see if there is any deep infection as bed side exam not much impressive for any Tenosynovitis   4. Will make recommendations following the results and will up date her       D/W Patient/Family in detail at the time of consultation        Total time spent for this encounter:30 min    (including interval history,physical exam, review of data including labs, cultures, imaging,  ordering labs, development and implementation of treatment plan, co ordination of care, counseling and education ). --Juan Miguel Winters MD on 7/5/2021 at 10:03 AM    An electronic signature was used to authenticate this note. Thanks for allowing me to participate your patient's care and please call me with any questions or concerns.     Lena Harvey MD  Infectious Disease  [facility] ([facility]) Physician  Phone: [phone]   Fax : [phone]

## 2021-06-25 ENCOUNTER — OFFICE VISIT (OUTPATIENT)
Dept: PSYCHOLOGY | Age: 66
End: 2021-06-25
Payer: MEDICARE

## 2021-06-25 DIAGNOSIS — R45.4 IRRITABILITY AND ANGER: ICD-10-CM

## 2021-06-25 DIAGNOSIS — F33.1 MODERATE EPISODE OF RECURRENT MAJOR DEPRESSIVE DISORDER (HCC): Primary | Chronic | ICD-10-CM

## 2021-06-25 PROCEDURE — 1036F TOBACCO NON-USER: CPT | Performed by: PSYCHOLOGIST

## 2021-06-25 PROCEDURE — 90832 PSYTX W PT 30 MINUTES: CPT | Performed by: PSYCHOLOGIST

## 2021-06-25 NOTE — Clinical Note
Ramiro Langston- I know next week is your last week, but this patient agreed to start on meds that will help her manage her irritability and anger. Not sure what that might be, but maybe beginning an SSRI that's safe for elderly folks may help her out-    Thanks!

## 2021-06-25 NOTE — PROGRESS NOTES
Activity: normal  Eye Contact: normal  Speech: low volume and slow  Mood: sad  Affect: congruent  Perception: within normal limits  Thought Content: within normal limits  Thought Process: logical, coherent and goal-directed  Insight: good  Judgment: intact  Ability to understand instructions: Yes  Ability to respond meaningfully: Yes  Morbid Ideation: no   Suicide Assessment: no suicidal ideation, plan, or intent  Homicidal Ideation: no    A:  We talked about taking medication to help her with her self-control, as she wishes she could be different. She talked about how [facility] LOC her talk assists her in managing her moods, and reduces her irritability. KIM 7 SCORE 6/14/2021   KIM-7 Total Score 14     Interpretation of KIM-7 score: 5-9 = mild anxiety, 10-14 = moderate anxiety, 15+ = severe anxiety. Recommend referral to behavioral health for scores 10 or greater. PHQ Scores 6/14/2021 6/9/2020 9/11/2019 3/1/2019 7/20/2017   PHQ2 Score 4 0 0 1 0   PHQ9 Score 17 0 0 1 0     Interpretation of Total Score Depression Severity: 1-4 = Minimal depression, 5-9 = Mild depression, 10-14 = Moderate depression, 15-19 = Moderately severe depression, 20-27 = Severe depression    Diagnosis:    1. Moderate episode of recurrent major depressive disorder ([facility]    2. Irritability and anger        Patient Active Problem List   Diagnosis    Mixed hyperlipidemia    Essential hypertension    Stroke ([facility]    Anxiety    Moderate episode of recurrent major depressive disorder (HCC)    Type 2 diabetes mellitus without complication (HCC)    Chronic pain syndrome    Diabetic nephropathy associated with type 2 diabetes mellitus ([facility]         Plan:  Pt interventions:  Established rapport, Garber-setting to identify pt's primary goals for [facility] visit / overall health, Supportive techniques and Provided Psychoeducation re: the grieving process.        Pt Behavioral Change Plan:  Pt set the following goals:  Pt will schedule F/U visit as needed

## 2021-06-28 NOTE — PROGRESS NOTES
Start pt on celexa 10 mg po qd with evening meal.  # 30  x 3 rf. Needs to f/u with new md in 1 month to see how she is doing on this med. Thanks.

## 2021-07-01 ENCOUNTER — TELEPHONE (OUTPATIENT)
Dept: INFECTIOUS DISEASES | Age: 66
End: 2021-07-01

## 2021-07-02 NOTE — TELEPHONE ENCOUNTER
Reviewed chart -  Pt with hx dog bite, seen 6/23 by Dr John Ramirez. Note incomplete. No plan. No cult results in Epic. ESR and CRP very mildly elevated. No urgency to treat.   Will defer to Dr John Ramirez to determine treatment plan next week

## 2021-07-02 NOTE — TELEPHONE ENCOUNTER
Spoke to patients sister- advised Dr. Omayra Garibay is out of the office until next week. Please review, she is aware may need to wait until Dr. Omayra Garibay returns to decide treatment plan.

## 2021-07-06 ENCOUNTER — TELEPHONE (OUTPATIENT)
Dept: INFECTIOUS DISEASES | Age: 66
End: 2021-07-06

## 2021-07-06 RX ORDER — METRONIDAZOLE 500 MG/1
500 TABLET ORAL 3 TIMES DAILY
Qty: 84 TABLET | Refills: 0 | Status: SHIPPED | OUTPATIENT
Start: 2021-07-06 | End: 2021-08-03

## 2021-07-06 RX ORDER — DOXYCYCLINE HYCLATE 100 MG/1
100 CAPSULE ORAL 2 TIMES DAILY
Qty: 56 CAPSULE | Refills: 0 | Status: SHIPPED | OUTPATIENT
Start: 2021-07-06 | End: 2021-08-03

## 2021-07-06 NOTE — TELEPHONE ENCOUNTER
Call and spoke to her care giver her sister about oral abx plan and if not improved on abx for x 4 weeks will get MRI of the Left hand   Try Doxycycline + Flagyl combination due to Augmentin allergy   Follow up x 4 weeks

## 2021-08-11 ENCOUNTER — OFFICE VISIT (OUTPATIENT)
Dept: INFECTIOUS DISEASES | Age: 66
End: 2021-08-11
Payer: MEDICARE

## 2021-08-11 DIAGNOSIS — M86.9 FINGER OSTEOMYELITIS, LEFT (HCC): Primary | ICD-10-CM

## 2021-08-11 PROCEDURE — 3017F COLORECTAL CA SCREEN DOC REV: CPT | Performed by: INTERNAL MEDICINE

## 2021-08-11 PROCEDURE — 4040F PNEUMOC VAC/ADMIN/RCVD: CPT | Performed by: INTERNAL MEDICINE

## 2021-08-11 PROCEDURE — 1036F TOBACCO NON-USER: CPT | Performed by: INTERNAL MEDICINE

## 2021-08-11 PROCEDURE — 1123F ACP DISCUSS/DSCN MKR DOCD: CPT | Performed by: INTERNAL MEDICINE

## 2021-08-11 PROCEDURE — G8400 PT W/DXA NO RESULTS DOC: HCPCS | Performed by: INTERNAL MEDICINE

## 2021-08-11 PROCEDURE — G8427 DOCREV CUR MEDS BY ELIG CLIN: HCPCS | Performed by: INTERNAL MEDICINE

## 2021-08-11 PROCEDURE — G8417 CALC BMI ABV UP PARAM F/U: HCPCS | Performed by: INTERNAL MEDICINE

## 2021-08-11 PROCEDURE — 1090F PRES/ABSN URINE INCON ASSESS: CPT | Performed by: INTERNAL MEDICINE

## 2021-08-11 PROCEDURE — 99213 OFFICE O/P EST LOW 20 MIN: CPT | Performed by: INTERNAL MEDICINE

## 2021-08-11 RX ORDER — ESOMEPRAZOLE MAGNESIUM 40 MG/1
CAPSULE, DELAYED RELEASE ORAL
Qty: 30 CAPSULE | Refills: 0 | Status: SHIPPED | OUTPATIENT
Start: 2021-08-11 | End: 2021-09-01

## 2021-08-11 NOTE — PROGRESS NOTES
MOOSE PLUS strip USE TO TEST BLOOD SUGAR TWO TIMES A DAY AS NEEDED 150 strip 12    betamethasone dipropionate (DIPROLENE) 0.05 % ointment Apply topically daily. 60 g 3    Blood Glucose Monitoring Suppl (ACCU-CHEK MOOSE CONNECT) W/DEVICE KIT 1 kit by Does not apply route daily 1 kit 0    esomeprazole (NEXIUM) 40 MG delayed release capsule Take one capsule daily 30 minutes before breakfast 30 capsule 0    traZODone (DESYREL) 50 MG tablet Take 1 tablet by mouth nightly (Patient not taking: Reported on 6/23/2021) 30 tablet 0     No current facility-administered medications for this visit.        Allergies:  Ampicillin and Penicillins      Immunizations :   Immunization History   Administered Date(s) Administered    Influenza Vaccine, unspecified formulation 11/13/2012    Influenza Virus Vaccine 09/24/2010, 11/13/2012, 09/16/2014, 10/02/2015    Influenza, Intradermal, Preservative free 12/06/2013    Influenza, Quadv, IM, PF (6 mo and older Fluzone, Flulaval, Fluarix, and 3 yrs and older Afluria) 09/26/2016, 12/03/2018, 09/05/2019, 10/14/2020    Pneumococcal Conjugate 13-valent ([id number]) 10/02/2015    Pneumococcal Conjugate 7-valent (Prevnar7) 12/03/2007    Pneumococcal Polysaccharide ([id number]) 01/09/2017    Tdap (Boostrix, Adacel) 04/02/2021           Social History:    Social History     Socioeconomic History    Marital status: Single     Spouse name: None    Number of children: None    Years of education: None    Highest education level: None   Occupational History    Occupation: disabled   Tobacco Use    Smoking status: Never Smoker    Smokeless tobacco: Never Used   Vaping Use    Vaping Use: Never used   Substance and Sexual Activity    Alcohol use: Not Currently    Drug use: No    Sexual activity: Never   Other Topics Concern    None   Social History Narrative    None     Social Determinants of Health     Financial Resource Strain:     Difficulty of Paying Living Expenses:    Food Insecurity:     Worried About Running Out of Food in the Last Year:     [address] in the Last Year:    Transportation Needs:     Lack of Transportation (Medical):  Lack of Transportation (Non-Medical):    Physical Activity:     Days of Exercise per Week:     Minutes of Exercise per Session:    Stress:     Feeling of Stress :    Social Connections:     Frequency of Communication with Friends and Family:     Frequency of Social Gatherings with Friends and Family:     Attends Rastafarian Services:     Active Member of Clubs or Organizations:     Attends Club or Organization Meetings:     Marital Status:    Intimate Partner Violence:     Fear of Current or Ex-Partner:     Emotionally Abused:     Physically Abused:     Sexually Abused:      Social History     Tobacco Use   Smoking Status Never Smoker   Smokeless Tobacco Never Used      Family History   Problem Relation Age of Onset    Other Mother         dementia    Other Father         parkinsons    Other Sister         chf    Colon Cancer Brother         REVIEW OF SYSTEMS:     CONSTITUTIONAL:  negative for fevers, chills, diaphoresis, activity change, appetite change, fatigue, night sweats and unexpected weight change.    EYES:  negative for blurred vision, eye discharge, visual disturbance and icterus  HEENT:  negative for hearing loss, tinnitus, ear drainage, sinus pressure, nasal congestion, epistaxis and snoring  RESPIRATORY:  No cough , no sob, no sputum production , no wheeze ,no hemoptysis   CARDIOVASCULAR:  negative for chest pain, palpitations, exertional chest pressure/discomfort, edema, syncope  GASTROINTESTINAL:  negative for nausea, vomiting, diarrhea, constipation, blood in stool and abdominal pain  GENITOURINARY:  negative for frequency, dysuria, urinary incontinence, decreased urine volume, and hematuria  HEMATOLOGIC/LYMPHATIC:  negative for easy bruising, bleeding and lymphadenopathy  ALLERGIC/IMMUNOLOGIC:  negative for recurrent infections, angioedema, anaphylaxis and drug reactions  ENDOCRINE:  negative for weight changes and diabetic symptoms including polyuria, polydipsia and polyphagia  MUSCULOSKELETAL:  Left index finger swelling+ , decreased range of motion and muscle weakness  INTEGUMENTARY: negative for skin color change, rashes, blisters  NEUROLOGICAL:  negative for headaches, slurred speech, unilateral weakness  PSYCHIATRIC/BEHAVIORAL: negative for hallucinations, behavioral problems, confusion and agitation. PHYSICAL EXAM:    Vitals: There were no vitals filed for this visit.   General Appearance: alert,in no acute distress, no pallor, no icterus   Skin: warm and dry, no rash or erythema  Head: normocephalic and atraumatic  Eyes: pupils equal, round, and reactive to light, extraocular eye movements intact, conjunctivae normal  ENT: tympanic membrane, external ear and ear canal normal bilaterally, nose without deformity, nasal mucosa and turbinates normal without polyps  Neck: supple and non-tender without mass, no thyromegaly  no cervical lymphadenopathy  Pulmonary/Chest: clear to auscultation bilaterally- no wheezes, rales or rhonchi, normal air movement,  Cardiovascular: normal rate, regular rhythm, normal S1 and S2, no murmurs, rubs, clicks, or gallops,   Abdomen: soft, non-tender, non-distended, normal bowel sounds, no masses or organomegaly  Extremities: no cyanosis, clubbing or edema  Musculoskeletal: normal range of motion, no joint swelling   Integumentary: no petechiae, no purpura, no blisters  Neurologic: reflexes normal and symmetric, no cranial nerve deficit, speech normal   Psych:  Orientation, sensorium, mood normal  Left index finger DIP joint some swelling but no local warmth no active cellulitis      DATA:    CBC:   Lab Results   Component Value Date    WBC 10.4 09/05/2019    HGB 12.4 09/05/2019    HCT 38.0 09/05/2019    MCV 85.8 09/05/2019     09/05/2019     RENAL:   Lab Results   Component Value Date    CREATININE 1.1 03/10/2021    BUN 15 03/10/2021     03/10/2021    K 4.1 03/10/2021     03/10/2021    CO2 22 03/10/2021     SED RATE:   Lab Results   Component Value Date    SEDRATE 25 06/23/2021     CK: No results found for: CKTOTAL  CRP:   Lab Results   Component Value Date    CRP 7.0 06/23/2021     Hepatic Function Panel:   Lab Results   Component Value Date    ALKPHOS 45 01/25/2021    ALT 16 01/25/2021    AST 13 01/25/2021    PROT 6.2 01/25/2021    PROT 7.4 02/19/2013    BILITOT 0.3 01/25/2021    LABALBU 3.8 01/25/2021     UA:  Lab Results   Component Value Date    LABMICR 1.70 10/23/2020      Urine Microscopic: No results found for: LABCAST, BACTERIA, COMU, HYALCAST, WBCUA, RBCUA, EPIU  Urine Reflex to Culture: No results found for: URRFLXCULT    MICRO: cultures reviewed and updated by me     Respiratory Culture:  No results found for: Min German  AFB:No results found for: [facility]    Urine Culture: No results for input(s): LABURIN in the last 72 hours. CRP  7    Esr  25       Imaging:  No orders to display       Labs, Microbiology and  Radiology results pertinent to this visit and from care every where  reviewed in detail by me as part of the consultation     IMPRESSION:     Diagnosis Orders   1. Finger osteomyelitis, left (HCC)       Left index finger osteomyelitis following a dog bite and was placed on oral abx and she completed the course recently and Left index finger swelling has improved and ROM still not back to normal but improving. She has DM delaying healing. Over all exam not concerning for any residual infection at this time given comorbid conditions risk for relapse d/w pt     PLAN:  1. She finished a course of oral Doxycycline + Flagyl combination due to allergies  2. ESR, CRP not much elevation   3. Exam reassuring   4.  Follow up as needed             Total time spent for this encounter:  23 min   on visit (including interval history,physical exam, review of data including labs, cultures, imaging,  ordering labs, development and implementation of treatment plan, co ordination of care, counseling and education ). --Shawnee Villegas MD on 8/29/2021 at 11:03 PM    An electronic signature was used to authenticate this note. D/w Patient in detail at  the time of consultation. Thanks for allowing me to participate in your patient's care and please do not hesitate to  call me with any questions or concerns.     Bony Doll MD  Infectious Disease  [facility]) Physician  Phone: [phone]   Fax : [phone]

## 2021-08-11 NOTE — TELEPHONE ENCOUNTER
Request nexium last filled 7/8/20                          Last ov 6/16/21                                                   Next ov 9/22/21 with dr Horne Neighbours to [facility]

## 2021-09-01 RX ORDER — ESOMEPRAZOLE MAGNESIUM 40 MG/1
CAPSULE, DELAYED RELEASE ORAL
Qty: 30 CAPSULE | Refills: 0 | Status: SHIPPED | OUTPATIENT
Start: 2021-09-01 | End: 2021-09-09 | Stop reason: SDUPTHER

## 2021-09-01 NOTE — TELEPHONE ENCOUNTER
Last ov 06/16/21  Future Appointments   Date Time Provider aYzan Willoughby   9/22/2021 10:00 AM Elizabeth Johnson MD [facility]

## 2021-09-07 DIAGNOSIS — I10 ESSENTIAL HYPERTENSION: Chronic | ICD-10-CM

## 2021-09-07 DIAGNOSIS — E78.2 MIXED HYPERLIPIDEMIA: Primary | ICD-10-CM

## 2021-09-07 RX ORDER — LISINOPRIL AND HYDROCHLOROTHIAZIDE 20; 12.5 MG/1; MG/1
1 TABLET ORAL 2 TIMES DAILY
Qty: 60 TABLET | Refills: 0 | Status: SHIPPED | OUTPATIENT
Start: 2021-09-07 | End: 2021-12-06 | Stop reason: SDUPTHER

## 2021-09-07 RX ORDER — ATORVASTATIN CALCIUM 20 MG/1
20 TABLET, FILM COATED ORAL DAILY
Qty: 90 TABLET | Refills: 0 | Status: SHIPPED | OUTPATIENT
Start: 2021-09-07 | End: 2022-01-10 | Stop reason: SDUPTHER

## 2021-09-09 ENCOUNTER — TELEPHONE (OUTPATIENT)
Dept: INTERNAL MEDICINE CLINIC | Age: 66
End: 2021-09-09

## 2021-09-09 DIAGNOSIS — K21.9 GASTROESOPHAGEAL REFLUX DISEASE, UNSPECIFIED WHETHER ESOPHAGITIS PRESENT: Primary | ICD-10-CM

## 2021-09-09 RX ORDER — ESOMEPRAZOLE MAGNESIUM 40 MG/1
CAPSULE, DELAYED RELEASE ORAL
Qty: 30 CAPSULE | Refills: 1 | Status: SHIPPED | OUTPATIENT
Start: 2021-09-09 | End: 2022-01-10 | Stop reason: SDUPTHER

## 2021-09-09 NOTE — TELEPHONE ENCOUNTER
Pt has an appt on 9-22-21. She needs Nexium 40 mg, 1 po qd, enough to last until appt. She only has 4 doses left.      Jamila Vaughan

## 2021-09-22 ENCOUNTER — OFFICE VISIT (OUTPATIENT)
Dept: INTERNAL MEDICINE CLINIC | Age: 66
End: 2021-09-22
Payer: MEDICARE

## 2021-09-22 VITALS
DIASTOLIC BLOOD PRESSURE: 72 MMHG | HEART RATE: 96 BPM | HEIGHT: 63 IN | SYSTOLIC BLOOD PRESSURE: 124 MMHG | OXYGEN SATURATION: 97 % | BODY MASS INDEX: 35.25 KG/M2

## 2021-09-22 DIAGNOSIS — E78.2 MIXED HYPERLIPIDEMIA: ICD-10-CM

## 2021-09-22 DIAGNOSIS — F32.9 REACTIVE DEPRESSION: ICD-10-CM

## 2021-09-22 DIAGNOSIS — I10 ESSENTIAL HYPERTENSION: ICD-10-CM

## 2021-09-22 DIAGNOSIS — N39.41 URGE INCONTINENCE: ICD-10-CM

## 2021-09-22 DIAGNOSIS — E11.21 TYPE 2 DIABETES MELLITUS WITH DIABETIC NEPHROPATHY, WITH LONG-TERM CURRENT USE OF INSULIN (HCC): Primary | ICD-10-CM

## 2021-09-22 DIAGNOSIS — Z79.4 TYPE 2 DIABETES MELLITUS WITH DIABETIC NEPHROPATHY, WITH LONG-TERM CURRENT USE OF INSULIN (HCC): Primary | ICD-10-CM

## 2021-09-22 DIAGNOSIS — K21.9 GASTROESOPHAGEAL REFLUX DISEASE, UNSPECIFIED WHETHER ESOPHAGITIS PRESENT: ICD-10-CM

## 2021-09-22 DIAGNOSIS — Z11.59 NEED FOR HEPATITIS C SCREENING TEST: ICD-10-CM

## 2021-09-22 PROBLEM — E66.01 MORBID OBESITY (HCC): Status: ACTIVE | Noted: 2018-09-26

## 2021-09-22 PROBLEM — I69.359 HEMIPARESIS AFFECTING DOMINANT SIDE AS LATE EFFECT OF CEREBROVASCULAR ACCIDENT (HCC): Status: ACTIVE | Noted: 2018-09-26

## 2021-09-22 PROBLEM — B35.1 ONYCHOMYCOSIS OF MULTIPLE TOENAILS WITH TYPE 2 DIABETES MELLITUS (HCC): Status: ACTIVE | Noted: 2018-09-26

## 2021-09-22 PROBLEM — E11.69 ONYCHOMYCOSIS OF MULTIPLE TOENAILS WITH TYPE 2 DIABETES MELLITUS (HCC): Status: ACTIVE | Noted: 2018-09-26

## 2021-09-22 LAB
A/G RATIO: 1.6 (ref 1.1–2.2)
ALBUMIN SERPL-MCNC: 4.4 G/DL (ref 3.4–5)
ALP BLD-CCNC: 57 U/L (ref 40–129)
ALT SERPL-CCNC: 26 U/L (ref 10–40)
ANION GAP SERPL CALCULATED.3IONS-SCNC: 17 MMOL/L (ref 3–16)
AST SERPL-CCNC: 22 U/L (ref 15–37)
BILIRUB SERPL-MCNC: 0.4 MG/DL (ref 0–1)
BUN BLDV-MCNC: 15 MG/DL (ref 7–20)
CALCIUM SERPL-MCNC: 9.7 MG/DL (ref 8.3–10.6)
CHLORIDE BLD-SCNC: 100 MMOL/L (ref 99–110)
CO2: 22 MMOL/L (ref 21–32)
CREAT SERPL-MCNC: 0.9 MG/DL (ref 0.6–1.2)
GFR AFRICAN AMERICAN: >60
GFR NON-AFRICAN AMERICAN: >60
GLOBULIN: 2.7 G/DL
GLUCOSE BLD-MCNC: 104 MG/DL (ref 70–99)
HBA1C MFR BLD: 5.9 %
POTASSIUM SERPL-SCNC: 4 MMOL/L (ref 3.5–5.1)
SODIUM BLD-SCNC: 139 MMOL/L (ref 136–145)
TOTAL PROTEIN: 7.1 G/DL (ref 6.4–8.2)

## 2021-09-22 PROCEDURE — G8417 CALC BMI ABV UP PARAM F/U: HCPCS | Performed by: INTERNAL MEDICINE

## 2021-09-22 PROCEDURE — 0509F URINE INCON PLAN DOCD: CPT | Performed by: INTERNAL MEDICINE

## 2021-09-22 PROCEDURE — 3017F COLORECTAL CA SCREEN DOC REV: CPT | Performed by: INTERNAL MEDICINE

## 2021-09-22 PROCEDURE — G8400 PT W/DXA NO RESULTS DOC: HCPCS | Performed by: INTERNAL MEDICINE

## 2021-09-22 PROCEDURE — 99215 OFFICE O/P EST HI 40 MIN: CPT | Performed by: INTERNAL MEDICINE

## 2021-09-22 PROCEDURE — 3044F HG A1C LEVEL LT 7.0%: CPT | Performed by: INTERNAL MEDICINE

## 2021-09-22 PROCEDURE — 1036F TOBACCO NON-USER: CPT | Performed by: INTERNAL MEDICINE

## 2021-09-22 PROCEDURE — G8427 DOCREV CUR MEDS BY ELIG CLIN: HCPCS | Performed by: INTERNAL MEDICINE

## 2021-09-22 PROCEDURE — 36415 COLL VENOUS BLD VENIPUNCTURE: CPT | Performed by: INTERNAL MEDICINE

## 2021-09-22 PROCEDURE — 83036 HEMOGLOBIN GLYCOSYLATED A1C: CPT | Performed by: INTERNAL MEDICINE

## 2021-09-22 PROCEDURE — 1090F PRES/ABSN URINE INCON ASSESS: CPT | Performed by: INTERNAL MEDICINE

## 2021-09-22 PROCEDURE — 4040F PNEUMOC VAC/ADMIN/RCVD: CPT | Performed by: INTERNAL MEDICINE

## 2021-09-22 PROCEDURE — 2022F DILAT RTA XM EVC RTNOPTHY: CPT | Performed by: INTERNAL MEDICINE

## 2021-09-22 PROCEDURE — 1123F ACP DISCUSS/DSCN MKR DOCD: CPT | Performed by: INTERNAL MEDICINE

## 2021-09-22 RX ORDER — POTASSIUM CHLORIDE 1500 MG/1
TABLET, EXTENDED RELEASE ORAL
COMMUNITY
Start: 2021-09-01

## 2021-09-22 RX ORDER — PHENOL 1.4 %
1 AEROSOL, SPRAY (ML) MUCOUS MEMBRANE DAILY
COMMUNITY

## 2021-09-22 RX ORDER — POTASSIUM CHLORIDE 1.5 G/1.77G
20 POWDER, FOR SOLUTION ORAL 2 TIMES DAILY
COMMUNITY

## 2021-09-22 RX ORDER — M-VIT,TX,IRON,MINS/CALC/FOLIC 27MG-0.4MG
1 TABLET ORAL DAILY
COMMUNITY

## 2021-09-22 RX ORDER — FUROSEMIDE 40 MG/1
TABLET ORAL
COMMUNITY
Start: 2021-09-01 | End: 2021-12-15 | Stop reason: SDUPTHER

## 2021-09-22 ASSESSMENT — ENCOUNTER SYMPTOMS
SORE THROAT: 0
NAUSEA: 0
ABDOMINAL PAIN: 0
BLOOD IN STOOL: 0
COUGH: 0
VOMITING: 0
SHORTNESS OF BREATH: 0

## 2021-09-22 NOTE — PROGRESS NOTES
Nikki Shay (:  1955) is a 73 y.o. female, Established patient, here for evaluation of the following chief complaint(s):  Diabetes         ASSESSMENT/PLAN:  1. Type 2 diabetes mellitus with diabetic nephropathy, with long-term current use of insulin ([facility] [address]  -     Ambulatory referral to Podiatry  -Stable, continue current dose of insulin glargine. 2. Essential hypertension  -     Comprehensive Metabolic Panel; Future  Stable continue current dose of amlodipine, lisinopril-hydrochlorothiazide. 3. Mixed hyperlipidemia  Stable, continue current dose of atorvastatin. 4. Gastroesophageal reflux disease, unspecified whether esophagitis present  Stable, continue current dose of esomeprazole  5. Urge incontinence  -     SHIELA - John Mcneil MD, Urology, [facility]  Continue oxybutynin, bedpan prescribed. 6. Reactive depression  Follow-up with psychologist.    Return in about 3 months (around 2021) for Hypertension, Diabetes. Subjective   SUBJECTIVE/OBJECTIVE:  GERD:  Stable on PPI. Urge incontinence:  Patient takes oxybutynin at night, as per the family she still has multiple episodes of incontinence during the night. Depression:  As per the caretaker patient is depressed after the death of her sister 4 months ago, has been seen by psychologist in  but she did not follow-up after that. Diabetes  She presents for her follow-up diabetic visit. She has type 2 diabetes mellitus. There are no hypoglycemic associated symptoms. Pertinent negatives for hypoglycemia include no dizziness. There are no diabetic associated symptoms. Pertinent negatives for diabetes include no chest pain, no fatigue and no weakness. There are no hypoglycemic complications. Diabetic complications include nephropathy. Risk factors for coronary artery disease include diabetes mellitus, dyslipidemia and hypertension. Current diabetic treatment includes insulin injections.  She is compliant with treatment all of the time. She has not had a previous visit with a dietitian. She rarely participates in exercise. An ACE inhibitor/angiotensin II receptor blocker is being taken. She does not see a podiatrist.Eye exam is current. Hypertension  This is a chronic problem. The current episode started more than 1 year ago. The problem is controlled. Pertinent negatives include no chest pain, palpitations or shortness of breath. Risk factors for coronary artery disease include diabetes mellitus and dyslipidemia. Past treatments include ACE inhibitors, diuretics and calcium channel blockers. The current treatment provides significant improvement. There are no compliance problems. Hypertensive end-organ damage includes CAD/MI. Hyperlipidemia  This is a chronic problem. The current episode started more than 1 year ago. The problem is controlled. Exacerbating diseases include diabetes. Pertinent negatives include no chest pain or shortness of breath. Current antihyperlipidemic treatment includes statins. The current treatment provides significant improvement of lipids. There are no compliance problems. Risk factors for coronary artery disease include diabetes mellitus, dyslipidemia and hypertension. Review of Systems   Constitutional: Negative for fatigue and fever. HENT: Negative for nosebleeds and sore throat. Respiratory: Negative for cough and shortness of breath. Cardiovascular: Negative for chest pain, palpitations and leg swelling. Gastrointestinal: Negative for abdominal pain, blood in stool, nausea and vomiting. Genitourinary: Positive for enuresis and frequency. Neurological: Negative for dizziness and weakness. Psychiatric/Behavioral: Positive for dysphoric mood. Objective   Physical Exam  Constitutional:       Appearance: Normal appearance. Comments: Patient on wheel chair   HENT:      Head: Normocephalic and atraumatic. Eyes:      General: No scleral icterus. Conjunctiva/sclera: Conjunctivae normal.   Cardiovascular:      Rate and Rhythm: Normal rate and regular rhythm. Pulses: Normal pulses. Heart sounds: Normal heart sounds. Pulmonary:      Effort: Pulmonary effort is normal.      Breath sounds: Normal breath sounds. Musculoskeletal:         General: No swelling. Skin:     General: Skin is warm and dry. Neurological:      Mental Status: She is alert and oriented to person, place, and time. Mental status is at baseline. Motor: Weakness present. Psychiatric:         Mood and Affect: Mood normal.         Behavior: Behavior normal.          During this visit I have spent 40 minutes reviewing previous notes, test results, medications and to perform face to face encounter with the patient discussing the diagnosis, lab results, medications and importance of compliance with the treatment plan as well as to complete documentation in electronic health records. An electronic signature was used to authenticate this note.     --Luis Schroeder MD

## 2021-10-14 DIAGNOSIS — E11.8 TYPE 2 DIABETES MELLITUS WITH COMPLICATION, WITH LONG-TERM CURRENT USE OF INSULIN (HCC): ICD-10-CM

## 2021-10-14 DIAGNOSIS — Z79.4 TYPE 2 DIABETES MELLITUS WITH COMPLICATION, WITH LONG-TERM CURRENT USE OF INSULIN (HCC): ICD-10-CM

## 2021-10-14 RX ORDER — BLOOD SUGAR DIAGNOSTIC
STRIP MISCELLANEOUS
Qty: 100 EACH | Refills: 12 | Status: SHIPPED | OUTPATIENT
Start: 2021-10-14 | End: 2022-01-13 | Stop reason: SDUPTHER

## 2021-11-15 ENCOUNTER — CLINICAL DOCUMENTATION (OUTPATIENT)
Dept: OTHER | Age: 66
End: 2021-11-15

## 2021-12-06 ENCOUNTER — TELEPHONE (OUTPATIENT)
Dept: INTERNAL MEDICINE CLINIC | Age: 66
End: 2021-12-06

## 2021-12-06 DIAGNOSIS — I10 ESSENTIAL HYPERTENSION: Chronic | ICD-10-CM

## 2021-12-06 RX ORDER — LISINOPRIL AND HYDROCHLOROTHIAZIDE 20; 12.5 MG/1; MG/1
1 TABLET ORAL 2 TIMES DAILY
Qty: 60 TABLET | Refills: 1 | Status: SHIPPED | OUTPATIENT
Start: 2021-12-06 | End: 2022-02-03

## 2021-12-06 NOTE — TELEPHONE ENCOUNTER
Patient is out of Lisinopril-HCTZ 20 mg-12.5 mg,, #60, 1 po bid. Jamila Vaughan.      Last ov 9-22-21

## 2021-12-15 DIAGNOSIS — I10 ESSENTIAL HYPERTENSION: Primary | ICD-10-CM

## 2021-12-15 RX ORDER — FUROSEMIDE 40 MG/1
40 TABLET ORAL DAILY
Qty: 90 TABLET | Refills: 0 | Status: SHIPPED | OUTPATIENT
Start: 2021-12-15

## 2022-01-10 ENCOUNTER — TELEPHONE (OUTPATIENT)
Dept: INTERNAL MEDICINE CLINIC | Age: 67
End: 2022-01-10

## 2022-01-10 DIAGNOSIS — E78.2 MIXED HYPERLIPIDEMIA: ICD-10-CM

## 2022-01-10 DIAGNOSIS — K21.9 GASTROESOPHAGEAL REFLUX DISEASE, UNSPECIFIED WHETHER ESOPHAGITIS PRESENT: ICD-10-CM

## 2022-01-10 RX ORDER — ATORVASTATIN CALCIUM 20 MG/1
20 TABLET, FILM COATED ORAL DAILY
Qty: 90 TABLET | Refills: 1 | Status: SHIPPED | OUTPATIENT
Start: 2022-01-10 | End: 2022-07-11

## 2022-01-10 RX ORDER — ESOMEPRAZOLE MAGNESIUM 40 MG/1
CAPSULE, DELAYED RELEASE ORAL
Qty: 30 CAPSULE | Refills: 1 | Status: SHIPPED | OUTPATIENT
Start: 2022-01-10 | End: 2022-03-09

## 2022-01-10 NOTE — TELEPHONE ENCOUNTER
Last seen: 9/22/21        Future Appointments   Date Time Provider Yazan Willoughby   1/17/2022 10:45 AM Martha De Luna MD [facility]

## 2022-01-10 NOTE — TELEPHONE ENCOUNTER
----- Message from Sriram Wilder sent at 1/8/2022 12:08 PM EST -----  Subject: Refill Request    QUESTIONS  Name of Medication? esomeprazole (NEXIUM) 40 MG delayed release capsule  Patient-reported dosage and instructions? 40mg 1x a day   How many days do you have left? 2  Preferred Pharmacy? [address]  Pharmacy phone number (if available)? [phone]  ---------------------------------------------------------------------------  --------------,  Name of Medication? atorvastatin (LIPITOR) 20 MG tablet  Patient-reported dosage and instructions? 20 mg 1x a day   How many days do you have left? 2  Preferred Pharmacy? [address]  Pharmacy phone number (if available)? [phone]  ---------------------------------------------------------------------------  --------------  Abdirashid ANGLIN  What is the best way for the office to contact you? OK to leave message on   voicemail  Preferred Call Back Phone Number?  [phone]

## 2022-01-13 ENCOUNTER — TELEPHONE (OUTPATIENT)
Dept: INTERNAL MEDICINE CLINIC | Age: 67
End: 2022-01-13

## 2022-01-13 DIAGNOSIS — E11.8 TYPE 2 DIABETES MELLITUS WITH COMPLICATION, WITH LONG-TERM CURRENT USE OF INSULIN (HCC): ICD-10-CM

## 2022-01-13 DIAGNOSIS — Z79.4 TYPE 2 DIABETES MELLITUS WITH COMPLICATION, WITH LONG-TERM CURRENT USE OF INSULIN (HCC): ICD-10-CM

## 2022-01-13 RX ORDER — BLOOD SUGAR DIAGNOSTIC
STRIP MISCELLANEOUS
Qty: 100 EACH | Refills: 1 | Status: SHIPPED | OUTPATIENT
Start: 2022-01-13 | End: 2022-01-18 | Stop reason: SDUPTHER

## 2022-01-18 ENCOUNTER — OFFICE VISIT (OUTPATIENT)
Dept: INTERNAL MEDICINE CLINIC | Age: 67
End: 2022-01-18
Payer: MEDICARE

## 2022-01-18 VITALS — OXYGEN SATURATION: 97 % | DIASTOLIC BLOOD PRESSURE: 72 MMHG | HEART RATE: 74 BPM | SYSTOLIC BLOOD PRESSURE: 122 MMHG

## 2022-01-18 DIAGNOSIS — F33.1 MODERATE EPISODE OF RECURRENT MAJOR DEPRESSIVE DISORDER (HCC): Chronic | ICD-10-CM

## 2022-01-18 DIAGNOSIS — N39.41 URGE INCONTINENCE: ICD-10-CM

## 2022-01-18 DIAGNOSIS — K21.9 GASTROESOPHAGEAL REFLUX DISEASE, UNSPECIFIED WHETHER ESOPHAGITIS PRESENT: ICD-10-CM

## 2022-01-18 DIAGNOSIS — E78.2 MIXED HYPERLIPIDEMIA: ICD-10-CM

## 2022-01-18 DIAGNOSIS — Z79.4 TYPE 2 DIABETES MELLITUS WITH COMPLICATION, WITH LONG-TERM CURRENT USE OF INSULIN (HCC): Primary | ICD-10-CM

## 2022-01-18 DIAGNOSIS — I10 ESSENTIAL HYPERTENSION: ICD-10-CM

## 2022-01-18 DIAGNOSIS — E11.8 TYPE 2 DIABETES MELLITUS WITH COMPLICATION, WITH LONG-TERM CURRENT USE OF INSULIN (HCC): Primary | ICD-10-CM

## 2022-01-18 LAB — HBA1C MFR BLD: 5.8 %

## 2022-01-18 PROCEDURE — 83036 HEMOGLOBIN GLYCOSYLATED A1C: CPT | Performed by: INTERNAL MEDICINE

## 2022-01-18 PROCEDURE — 1123F ACP DISCUSS/DSCN MKR DOCD: CPT | Performed by: INTERNAL MEDICINE

## 2022-01-18 PROCEDURE — 1036F TOBACCO NON-USER: CPT | Performed by: INTERNAL MEDICINE

## 2022-01-18 PROCEDURE — 2022F DILAT RTA XM EVC RTNOPTHY: CPT | Performed by: INTERNAL MEDICINE

## 2022-01-18 PROCEDURE — G8484 FLU IMMUNIZE NO ADMIN: HCPCS | Performed by: INTERNAL MEDICINE

## 2022-01-18 PROCEDURE — G8400 PT W/DXA NO RESULTS DOC: HCPCS | Performed by: INTERNAL MEDICINE

## 2022-01-18 PROCEDURE — G8417 CALC BMI ABV UP PARAM F/U: HCPCS | Performed by: INTERNAL MEDICINE

## 2022-01-18 PROCEDURE — 0509F URINE INCON PLAN DOCD: CPT | Performed by: INTERNAL MEDICINE

## 2022-01-18 PROCEDURE — 3017F COLORECTAL CA SCREEN DOC REV: CPT | Performed by: INTERNAL MEDICINE

## 2022-01-18 PROCEDURE — 4040F PNEUMOC VAC/ADMIN/RCVD: CPT | Performed by: INTERNAL MEDICINE

## 2022-01-18 PROCEDURE — 1090F PRES/ABSN URINE INCON ASSESS: CPT | Performed by: INTERNAL MEDICINE

## 2022-01-18 PROCEDURE — 3044F HG A1C LEVEL LT 7.0%: CPT | Performed by: INTERNAL MEDICINE

## 2022-01-18 PROCEDURE — 99214 OFFICE O/P EST MOD 30 MIN: CPT | Performed by: INTERNAL MEDICINE

## 2022-01-18 PROCEDURE — G8427 DOCREV CUR MEDS BY ELIG CLIN: HCPCS | Performed by: INTERNAL MEDICINE

## 2022-01-18 RX ORDER — BLOOD SUGAR DIAGNOSTIC
STRIP MISCELLANEOUS
Qty: 100 EACH | Refills: 1 | Status: SHIPPED | OUTPATIENT
Start: 2022-01-18 | End: 2022-01-20

## 2022-01-18 RX ORDER — INSULIN GLARGINE 100 [IU]/ML
INJECTION, SOLUTION SUBCUTANEOUS
Qty: 100 ML | Refills: 5 | Status: SHIPPED | OUTPATIENT
Start: 2022-01-18

## 2022-01-18 ASSESSMENT — ENCOUNTER SYMPTOMS
ABDOMINAL PAIN: 0
BLOOD IN STOOL: 0
VOMITING: 0
SHORTNESS OF BREATH: 0
NAUSEA: 0
COUGH: 0
SORE THROAT: 0

## 2022-01-18 NOTE — PROGRESS NOTES
Kenton Alfonso (:  1955) is a 73 y.o. female, Established patient, here for evaluation of the following chief complaint(s):  Diabetes and Hypertension         ASSESSMENT/PLAN:  1. Type 2 diabetes mellitus with diabetic nephropathy, with long-term current use of insulin (HCC)  -Stable, continue current dose of insulin glargine. 2. Essential hypertension  Stable continue current dose of amlodipine, lisinopril-hydrochlorothiazide. 3. Mixed hyperlipidemia  Stable, continue current dose of atorvastatin. 4. Gastroesophageal reflux disease, unspecified whether esophagitis present  Stable, continue current dose of esomeprazole  5. Urge incontinence  Continue oxybutynin, bedpads prescribed. 6. Reactive depression  Follow-up with psychiatrist.    Return in about 3 months (around 2022). Subjective   SUBJECTIVE/OBJECTIVE:  GERD:  Stable on PPI. Urge incontinence:  Patient takes oxybutynin at night, as per the family she still has multiple episodes of incontinence during the night. Depression:  As per the caretaker patient is depressed after the death of her sister 7 months ago, has been seen by psychologist in  but she did not follow-up after that. Diabetes  She presents for her follow-up diabetic visit. She has type 2 diabetes mellitus. There are no hypoglycemic associated symptoms. Pertinent negatives for hypoglycemia include no dizziness. There are no diabetic associated symptoms. Pertinent negatives for diabetes include no chest pain, no fatigue and no weakness. There are no hypoglycemic complications. Diabetic complications include nephropathy. Risk factors for coronary artery disease include diabetes mellitus, dyslipidemia and hypertension. Current diabetic treatment includes insulin injections. She is compliant with treatment all of the time. She has not had a previous visit with a dietitian. She rarely participates in exercise.  An ACE inhibitor/angiotensin II receptor blocker is being taken. She does not see a podiatrist.Eye exam is current. Hypertension  This is a chronic problem. The current episode started more than 1 year ago. The problem is controlled. Pertinent negatives include no chest pain, palpitations or shortness of breath. Risk factors for coronary artery disease include diabetes mellitus and dyslipidemia. Past treatments include ACE inhibitors, diuretics and calcium channel blockers. The current treatment provides significant improvement. There are no compliance problems. Hypertensive end-organ damage includes CAD/MI. Hyperlipidemia  This is a chronic problem. The current episode started more than 1 year ago. The problem is controlled. Exacerbating diseases include diabetes. Pertinent negatives include no chest pain or shortness of breath. Current antihyperlipidemic treatment includes statins. The current treatment provides significant improvement of lipids. There are no compliance problems. Risk factors for coronary artery disease include diabetes mellitus, dyslipidemia and hypertension. Review of Systems   Constitutional: Negative for fatigue and fever. HENT: Negative for nosebleeds and sore throat. Respiratory: Negative for cough and shortness of breath. Cardiovascular: Negative for chest pain, palpitations and leg swelling. Gastrointestinal: Negative for abdominal pain, blood in stool, nausea and vomiting. Neurological: Negative for dizziness and weakness. Psychiatric/Behavioral: Positive for dysphoric mood. Objective   Physical Exam  Constitutional:       Appearance: Normal appearance. Comments: Patient on wheel chair   HENT:      Head: Normocephalic and atraumatic. Eyes:      General: No scleral icterus. Conjunctiva/sclera: Conjunctivae normal.   Cardiovascular:      Rate and Rhythm: Normal rate and regular rhythm. Pulses: Normal pulses. Heart sounds: Normal heart sounds.    Pulmonary:      Effort: Pulmonary effort is normal.      Breath sounds: Normal breath sounds. Musculoskeletal:         General: No swelling. Skin:     General: Skin is warm and dry. Neurological:      Mental Status: She is alert and oriented to person, place, and time. Mental status is at baseline. Motor: Weakness present. Psychiatric:         Mood and Affect: Mood normal.         Behavior: Behavior normal.              An electronic signature was used to authenticate this note.     --Judy Espinosa MD

## 2022-01-19 NOTE — TELEPHONE ENCOUNTER
syringes  need to 6 mm not     1/2 ml 6 mm  Syringes 31 gauge .     We sent in 8 mm and that is not right    [facility] [phone]

## 2022-01-20 DIAGNOSIS — E11.8 TYPE 2 DIABETES MELLITUS WITH COMPLICATION, WITH LONG-TERM CURRENT USE OF INSULIN (HCC): ICD-10-CM

## 2022-01-20 DIAGNOSIS — Z79.4 TYPE 2 DIABETES MELLITUS WITH COMPLICATION, WITH LONG-TERM CURRENT USE OF INSULIN (HCC): ICD-10-CM

## 2022-02-02 DIAGNOSIS — I10 ESSENTIAL HYPERTENSION: Chronic | ICD-10-CM

## 2022-02-03 RX ORDER — LISINOPRIL AND HYDROCHLOROTHIAZIDE 20; 12.5 MG/1; MG/1
TABLET ORAL
Qty: 60 TABLET | Refills: 2 | Status: SHIPPED | OUTPATIENT
Start: 2022-02-03 | End: 2022-04-21 | Stop reason: SDUPTHER

## 2022-03-08 DIAGNOSIS — K21.9 GASTROESOPHAGEAL REFLUX DISEASE, UNSPECIFIED WHETHER ESOPHAGITIS PRESENT: ICD-10-CM

## 2022-03-09 RX ORDER — ESOMEPRAZOLE MAGNESIUM 40 MG/1
CAPSULE, DELAYED RELEASE ORAL
Qty: 30 CAPSULE | Refills: 3 | Status: SHIPPED | OUTPATIENT
Start: 2022-03-09 | End: 2022-07-06

## 2022-03-09 NOTE — TELEPHONE ENCOUNTER
Request nexium last filled 1/10/22                            Last ov 1/18/22            Next ov 4/19/22

## 2022-03-14 ENCOUNTER — TELEPHONE (OUTPATIENT)
Dept: PHARMACY | Facility: CLINIC | Age: 67
End: 2022-03-14

## 2022-03-14 NOTE — LETTER
[place]  [address], [address]        [address] [address]           03/15/22     Dear Toshia Saxena,    We tried to reach you recently regarding your Lisinopril HCTZ, but were unable to reach you on the telephone. This medication can be filled for a 3-month supply to save you time and trips to the pharmacy  if you would like assistance in switching your prescriptions to a 3-month supply, please contact us    If you are no longer taking it, please call us at the number below so that we can discuss this and update your medication profile.           Sincerely,     [address]   Phone: [phone]

## 2022-03-14 NOTE — TELEPHONE ENCOUNTER
[facility] CLINICAL PHARMACY REVIEW: ADHERENCE REVIEW  Identified care gap per [facility]; fills at [place]: ACE/ARB and Statin adherence      Last Visit: 22    ASSESSMENT  ACE/ARB ADHERENCE  Per Insurance Records :   RX:  Lisinopril HCTZ 20-12.5 mg tab  last filled on 22 for 30 day supply. Next refill due: 22. Fail Date:  22. Per Reconciled Dispense Report:  (same as above). Per Pharmacy: [place]  last picked up on 22 for 30 day supply. Refills remainin  Billed through [facility]     BP Readings from Last 3 Encounters:   22 122/72   21 124/72   21 108/60     CrCl cannot be calculated (Patient's most recent lab result is older than the maximum 120 days allowed. ). DIABETES ADHERENCE  RX:  Insulin glargine (Lantus) 100 unit/mL vial    Lab Results   Component Value Date    LABA1C 5.8 2022    LABA1C 5.9 2021    LABA1C 5.9 2021     NOTE A1c <9%    STATIN ADHERENCE  Per Insurance Records:   RX:  Atorvastatin 20 mg tab  last filled on 01.10.22 for 90 day supply. Next refill due: 04.10.22. Fail Date:  22. Per Reconciled Dispense Report:  (same as above). Per Pharmacy: [place]  last picked up on 22 for 90 day supply. Refills remainin  Billed through [employer]   Component Value Date    CHOL 145 2021    TRIG 121 2021    HDL 36 (L) 2021    LDLCALC 85 2021    LDLDIRECT 95 2020     ALT   Date Value Ref Range Status   2021 26 10 - 40 U/L Final     AST   Date Value Ref Range Status   2021 22 15 - 37 U/L Final     The ASCVD Risk score (Danny Sparks, et al., 2013) failed to calculate for the following reasons: The patient has a prior MI or stroke diagnosis     PLAN  The following are interventions that have been identified:     - Patient current with refills.     - Patient identified as LIS = 1, therefore patient may be able to receive a 90-day supply for the same cost as a 30-day supply   - Lisinopril HCTZ filled as 30 day supply, is patient interested in filling as a 90 day supply? Attempting to reach patient to review; left message asking for return call.         Future Appointments   Date Time Provider Yazan Willoughby   4/19/2022 10:30 AM Allie Guzman  [address] [place] [address]  Population Health   [address] [facility] Clinical [facility]  Phone: toll free [phone]

## 2022-03-15 NOTE — TELEPHONE ENCOUNTER
For Pharmacy [address] in place:  No   Recommendation Provided To: Patient/Caregiver: 1 via Telephone   Intervention Detail: Adherence Monitorin   Gap Closed?: No    Intervention Accepted By: Patient/Caregiver: 0   Time Spent (min): 15

## 2022-03-29 ENCOUNTER — TELEPHONE (OUTPATIENT)
Dept: INTERNAL MEDICINE CLINIC | Age: 67
End: 2022-03-29

## 2022-03-29 DIAGNOSIS — I10 ESSENTIAL HYPERTENSION: ICD-10-CM

## 2022-03-29 RX ORDER — AMLODIPINE BESYLATE 5 MG/1
TABLET ORAL
Qty: 30 TABLET | Refills: 12 | Status: CANCELLED | OUTPATIENT
Start: 2022-03-29

## 2022-03-29 RX ORDER — AMLODIPINE BESYLATE 5 MG/1
TABLET ORAL
Qty: 30 TABLET | Refills: 12 | Status: SHIPPED | OUTPATIENT
Start: 2022-03-29

## 2022-03-29 NOTE — TELEPHONE ENCOUNTER
Last seen: 1/18/22        Future Appointments   Date Time Provider Yazan Willoughby   4/19/2022 10:30 AM Carolann Parker MD [facility]

## 2022-04-13 ENCOUNTER — TELEPHONE (OUTPATIENT)
Dept: PHARMACY | Facility: CLINIC | Age: 67
End: 2022-04-13

## 2022-04-13 NOTE — TELEPHONE ENCOUNTER
Luz Castillo MD    Just an fyi, this patient has something called Medicare [address] and can get 90 day supplies of maintenance medication for the same price as 30 day supplies. She has an upcoming appt with you on 4/19/22. Please consider writing prescriptions a 90 day supplies to promote adherence and help save her money. Thank you,  ANGELI Heck PharmD, [address] [facility], toll free: [phone]        =======================================================      POPULATION HEALTH CLINICAL PHARMACY REVIEW: ADHERENCE REVIEW  Identified care gap per Trace; ronaks at [facility]: ACE/ARB and Statin adherence    Patient identified as LIS = 1, therefore patient may be able to receive a 90-day supply for the same cost as a 30-day supply    Patients sister returned our call today after she received our letter in the mail. I explained that she has low income subsidy with her current insurance plan, therefore a 90ds cost the exact same as a 30 day supply. She did not realize this, and would like to get 90ds if possible. Encouraged her to discuss with her provider at her upcoming appt on 4/19. I will send a message to her PCP as well. They typically send 30ds with multiple refills. Also reminded patient that they can request the pharmacy to combine refills on non-controlled medications if they would like. Patient is currently out of refills on her Lisinopril/hctz. Recently picked up a 30ds on time and has enough to last through 5/4    W.  Lis Heck, PharmD, [address] [facility], toll free: [phone]    For Pharmacy [id number] in place:  No   Recommendation Provided To: Provider: 1 via Note to Provider and Patient/Caregiver: 1 via Telephone   Intervention Detail: Adherence Monitorin and Refill(s) Provided   Gap Closed?: Yes    Intervention Accepted By: Provider: 0 and Patient/Caregiver: 1   Time Spent (min): 20

## 2022-04-19 ENCOUNTER — OFFICE VISIT (OUTPATIENT)
Dept: INTERNAL MEDICINE CLINIC | Age: 67
End: 2022-04-19
Payer: MEDICARE

## 2022-04-19 VITALS
TEMPERATURE: 97.3 F | BODY MASS INDEX: 34.26 KG/M2 | DIASTOLIC BLOOD PRESSURE: 74 MMHG | WEIGHT: 193.4 LBS | OXYGEN SATURATION: 98 % | RESPIRATION RATE: 16 BRPM | HEART RATE: 74 BPM | SYSTOLIC BLOOD PRESSURE: 120 MMHG

## 2022-04-19 DIAGNOSIS — I10 ESSENTIAL HYPERTENSION: ICD-10-CM

## 2022-04-19 DIAGNOSIS — E11.8 TYPE 2 DIABETES MELLITUS WITH COMPLICATION, WITH LONG-TERM CURRENT USE OF INSULIN (HCC): Primary | ICD-10-CM

## 2022-04-19 DIAGNOSIS — N39.41 URGE INCONTINENCE: ICD-10-CM

## 2022-04-19 DIAGNOSIS — E78.2 MIXED HYPERLIPIDEMIA: ICD-10-CM

## 2022-04-19 DIAGNOSIS — Z79.4 TYPE 2 DIABETES MELLITUS WITH COMPLICATION, WITH LONG-TERM CURRENT USE OF INSULIN (HCC): Primary | ICD-10-CM

## 2022-04-19 DIAGNOSIS — Z11.59 NEED FOR HEPATITIS C SCREENING TEST: ICD-10-CM

## 2022-04-19 DIAGNOSIS — F32.9 REACTIVE DEPRESSION: ICD-10-CM

## 2022-04-19 DIAGNOSIS — K21.9 GASTROESOPHAGEAL REFLUX DISEASE, UNSPECIFIED WHETHER ESOPHAGITIS PRESENT: ICD-10-CM

## 2022-04-19 DIAGNOSIS — Z11.4 SCREENING FOR HIV (HUMAN IMMUNODEFICIENCY VIRUS): ICD-10-CM

## 2022-04-19 LAB
A/G RATIO: 1.8 (ref 1.1–2.2)
ALBUMIN SERPL-MCNC: 4.4 G/DL (ref 3.4–5)
ALP BLD-CCNC: 53 U/L (ref 40–129)
ALT SERPL-CCNC: 20 U/L (ref 10–40)
ANION GAP SERPL CALCULATED.3IONS-SCNC: 13 MMOL/L (ref 3–16)
AST SERPL-CCNC: 17 U/L (ref 15–37)
BILIRUB SERPL-MCNC: 0.6 MG/DL (ref 0–1)
BUN BLDV-MCNC: 16 MG/DL (ref 7–20)
CALCIUM SERPL-MCNC: 9.5 MG/DL (ref 8.3–10.6)
CHLORIDE BLD-SCNC: 99 MMOL/L (ref 99–110)
CHOLESTEROL, TOTAL: 147 MG/DL (ref 0–199)
CO2: 24 MMOL/L (ref 21–32)
CREAT SERPL-MCNC: 0.8 MG/DL (ref 0.6–1.2)
GFR AFRICAN AMERICAN: >60
GFR NON-AFRICAN AMERICAN: >60
GLUCOSE BLD-MCNC: 89 MG/DL (ref 70–99)
HBA1C MFR BLD: 5.9 %
HDLC SERPL-MCNC: 51 MG/DL (ref 40–60)
HEPATITIS C ANTIBODY INTERPRETATION: NORMAL
LDL CHOLESTEROL CALCULATED: 74 MG/DL
POTASSIUM SERPL-SCNC: 3.8 MMOL/L (ref 3.5–5.1)
SODIUM BLD-SCNC: 136 MMOL/L (ref 136–145)
TOTAL PROTEIN: 6.9 G/DL (ref 6.4–8.2)
TRIGL SERPL-MCNC: 108 MG/DL (ref 0–150)
VLDLC SERPL CALC-MCNC: 22 MG/DL

## 2022-04-19 PROCEDURE — 0509F URINE INCON PLAN DOCD: CPT | Performed by: INTERNAL MEDICINE

## 2022-04-19 PROCEDURE — G8417 CALC BMI ABV UP PARAM F/U: HCPCS | Performed by: INTERNAL MEDICINE

## 2022-04-19 PROCEDURE — 36415 COLL VENOUS BLD VENIPUNCTURE: CPT | Performed by: INTERNAL MEDICINE

## 2022-04-19 PROCEDURE — 1090F PRES/ABSN URINE INCON ASSESS: CPT | Performed by: INTERNAL MEDICINE

## 2022-04-19 PROCEDURE — G8427 DOCREV CUR MEDS BY ELIG CLIN: HCPCS | Performed by: INTERNAL MEDICINE

## 2022-04-19 PROCEDURE — 3017F COLORECTAL CA SCREEN DOC REV: CPT | Performed by: INTERNAL MEDICINE

## 2022-04-19 PROCEDURE — 1123F ACP DISCUSS/DSCN MKR DOCD: CPT | Performed by: INTERNAL MEDICINE

## 2022-04-19 PROCEDURE — 1036F TOBACCO NON-USER: CPT | Performed by: INTERNAL MEDICINE

## 2022-04-19 PROCEDURE — G8400 PT W/DXA NO RESULTS DOC: HCPCS | Performed by: INTERNAL MEDICINE

## 2022-04-19 PROCEDURE — 4040F PNEUMOC VAC/ADMIN/RCVD: CPT | Performed by: INTERNAL MEDICINE

## 2022-04-19 PROCEDURE — 99214 OFFICE O/P EST MOD 30 MIN: CPT | Performed by: INTERNAL MEDICINE

## 2022-04-19 PROCEDURE — 3044F HG A1C LEVEL LT 7.0%: CPT | Performed by: INTERNAL MEDICINE

## 2022-04-19 PROCEDURE — 83036 HEMOGLOBIN GLYCOSYLATED A1C: CPT | Performed by: INTERNAL MEDICINE

## 2022-04-19 PROCEDURE — 2022F DILAT RTA XM EVC RTNOPTHY: CPT | Performed by: INTERNAL MEDICINE

## 2022-04-19 SDOH — ECONOMIC STABILITY: FOOD INSECURITY: WITHIN THE PAST 12 MONTHS, YOU WORRIED THAT YOUR FOOD WOULD RUN OUT BEFORE YOU GOT MONEY TO BUY MORE.: NEVER TRUE

## 2022-04-19 SDOH — ECONOMIC STABILITY: FOOD INSECURITY: WITHIN THE PAST 12 MONTHS, THE FOOD YOU BOUGHT JUST DIDN'T LAST AND YOU DIDN'T HAVE MONEY TO GET MORE.: NEVER TRUE

## 2022-04-19 ASSESSMENT — PATIENT HEALTH QUESTIONNAIRE - PHQ9
SUM OF ALL RESPONSES TO PHQ QUESTIONS 1-9: 6
10. IF YOU CHECKED OFF ANY PROBLEMS, HOW DIFFICULT HAVE THESE PROBLEMS MADE IT FOR YOU TO DO YOUR WORK, TAKE CARE OF THINGS AT HOME, OR GET ALONG WITH OTHER PEOPLE: 0
3. TROUBLE FALLING OR STAYING ASLEEP: 0
SUM OF ALL RESPONSES TO PHQ QUESTIONS 1-9: 6
SUM OF ALL RESPONSES TO PHQ9 QUESTIONS 1 & 2: 3
8. MOVING OR SPEAKING SO SLOWLY THAT OTHER PEOPLE COULD HAVE NOTICED. OR THE OPPOSITE, BEING SO FIGETY OR RESTLESS THAT YOU HAVE BEEN MOVING AROUND A LOT MORE THAN USUAL: 0
9. THOUGHTS THAT YOU WOULD BE BETTER OFF DEAD, OR OF HURTING YOURSELF: 0
5. POOR APPETITE OR OVEREATING: 0
1. LITTLE INTEREST OR PLEASURE IN DOING THINGS: 1
7. TROUBLE CONCENTRATING ON THINGS, SUCH AS READING THE NEWSPAPER OR WATCHING TELEVISION: 3
SUM OF ALL RESPONSES TO PHQ QUESTIONS 1-9: 6
4. FEELING TIRED OR HAVING LITTLE ENERGY: 0
SUM OF ALL RESPONSES TO PHQ QUESTIONS 1-9: 6
2. FEELING DOWN, DEPRESSED OR HOPELESS: 2

## 2022-04-19 ASSESSMENT — ENCOUNTER SYMPTOMS
COUGH: 0
ABDOMINAL PAIN: 0
SHORTNESS OF BREATH: 0
NAUSEA: 0
SORE THROAT: 0
BLOOD IN STOOL: 0
VOMITING: 0

## 2022-04-19 ASSESSMENT — SOCIAL DETERMINANTS OF HEALTH (SDOH): HOW HARD IS IT FOR YOU TO PAY FOR THE VERY BASICS LIKE FOOD, HOUSING, MEDICAL CARE, AND HEATING?: NOT HARD AT ALL

## 2022-04-19 NOTE — PROGRESS NOTES
James Smyth (:  1955) is a 73 y.o. female, Established patient, here for evaluation of the following chief complaint(s):  Diabetes         ASSESSMENT/PLAN:  1. Type 2 diabetes mellitus with diabetic nephropathy, with long-term current use of insulin (HCC)  -Stable, continue current dose of insulin glargine. 2. Essential hypertension  Stable continue current dose of amlodipine, lisinopril-hydrochlorothiazide. 3. Mixed hyperlipidemia  Stable, continue current dose of atorvastatin. 4. Gastroesophageal reflux disease, unspecified whether esophagitis present  Stable, continue current dose of esomeprazole  5. Urge incontinence  Continue oxybutynin. 6. Reactive depression  Follow-up with psychiatrist.  7. Screening for HIV (human immunodeficiency virus)  -     HIV Screen; Future  8. Need for hepatitis C screening test  -     Hepatitis C Antibody; Future      Return in about 3 months (around 2022). Subjective   SUBJECTIVE/OBJECTIVE:  GERD:  Stable on PPI. Urge incontinence:  Patient takes oxybutynin at night. Depression:  As per the caretaker patient is depressed after the death of her sister, has been seen by psychologist in  but she did not follow-up after that. Diabetes  She presents for her follow-up diabetic visit. She has type 2 diabetes mellitus. There are no hypoglycemic associated symptoms. Pertinent negatives for hypoglycemia include no dizziness. There are no diabetic associated symptoms. Pertinent negatives for diabetes include no chest pain, no fatigue and no weakness. There are no hypoglycemic complications. Diabetic complications include nephropathy. Risk factors for coronary artery disease include diabetes mellitus, dyslipidemia and hypertension. Current diabetic treatment includes insulin injections. She is compliant with treatment all of the time. She has not had a previous visit with a dietitian. She rarely participates in exercise.  An ACE inhibitor/angiotensin II receptor blocker is being taken. She does not see a podiatrist.Eye exam is current. Hypertension  This is a chronic problem. The current episode started more than 1 year ago. The problem is controlled. Pertinent negatives include no chest pain, palpitations or shortness of breath. Risk factors for coronary artery disease include diabetes mellitus and dyslipidemia. Past treatments include ACE inhibitors, diuretics and calcium channel blockers. The current treatment provides significant improvement. There are no compliance problems. Hypertensive end-organ damage includes CAD/MI. Hyperlipidemia  This is a chronic problem. The current episode started more than 1 year ago. The problem is controlled. Exacerbating diseases include diabetes. Pertinent negatives include no chest pain or shortness of breath. Current antihyperlipidemic treatment includes statins. The current treatment provides significant improvement of lipids. There are no compliance problems. Risk factors for coronary artery disease include diabetes mellitus, dyslipidemia and hypertension. Review of Systems   Constitutional: Negative for fatigue and fever. HENT: Negative for nosebleeds and sore throat. Respiratory: Negative for cough and shortness of breath. Cardiovascular: Negative for chest pain, palpitations and leg swelling. Gastrointestinal: Negative for abdominal pain, blood in stool, nausea and vomiting. Neurological: Negative for dizziness and weakness. Psychiatric/Behavioral: Positive for dysphoric mood. Objective   Physical Exam  Constitutional:       Appearance: Normal appearance. Comments: Patient on wheel chair   HENT:      Head: Normocephalic and atraumatic. Eyes:      General: No scleral icterus. Conjunctiva/sclera: Conjunctivae normal.   Cardiovascular:      Rate and Rhythm: Normal rate and regular rhythm. Pulses: Normal pulses. Heart sounds: Normal heart sounds.    Pulmonary: Effort: Pulmonary effort is normal.      Breath sounds: Normal breath sounds. Musculoskeletal:         General: No swelling. Skin:     General: Skin is warm and dry. Neurological:      Mental Status: She is alert and oriented to person, place, and time. Mental status is at baseline. Motor: Weakness present. Psychiatric:         Mood and Affect: Mood normal.         Behavior: Behavior normal.              An electronic signature was used to authenticate this note.     --Socorro Harden MD

## 2022-04-20 LAB
HIV AG/AB: NORMAL
HIV ANTIGEN: NORMAL
HIV-1 ANTIBODY: NORMAL
HIV-2 AB: NORMAL

## 2022-04-21 DIAGNOSIS — I10 ESSENTIAL HYPERTENSION: Chronic | ICD-10-CM

## 2022-04-21 RX ORDER — LISINOPRIL AND HYDROCHLOROTHIAZIDE 20; 12.5 MG/1; MG/1
TABLET ORAL
Qty: 180 TABLET | Refills: 0 | Status: SHIPPED | OUTPATIENT
Start: 2022-04-21 | End: 2022-05-07

## 2022-04-21 NOTE — TELEPHONE ENCOUNTER
Cosimo Buerger, MD, patient out of refills. Rx pended for your signature/modification as appropriate. 90 day supply will cost the same as 30. LOV: 4/19/22  Next: 7/21/22    Thank you,  ANGELI Martínez, PharmD, [address] [facility]  [facility], toll free: [phone]

## 2022-04-21 NOTE — TELEPHONE ENCOUNTER
Provider done'd note. Will pend prescription refill request in separate encounter since nothing addressed at [facility]    W.  Jordan Pires, PharmD, [address] [facility], toll free: [phone]

## 2022-04-22 ENCOUNTER — TELEPHONE (OUTPATIENT)
Dept: INTERNAL MEDICINE CLINIC | Age: 67
End: 2022-04-22

## 2022-04-22 DIAGNOSIS — E11.8 TYPE 2 DIABETES MELLITUS WITH COMPLICATION, WITH LONG-TERM CURRENT USE OF INSULIN (HCC): ICD-10-CM

## 2022-04-22 DIAGNOSIS — Z79.4 TYPE 2 DIABETES MELLITUS WITH COMPLICATION, WITH LONG-TERM CURRENT USE OF INSULIN (HCC): ICD-10-CM

## 2022-04-26 RX ORDER — SYRING-NEEDL,DISP,INSUL,0.3 ML 31GX15/64"
SYRINGE, EMPTY DISPOSABLE MISCELLANEOUS
Qty: 100 EACH | Refills: 3 | Status: SHIPPED | OUTPATIENT
Start: 2022-04-26

## 2022-05-06 DIAGNOSIS — I10 ESSENTIAL HYPERTENSION: Chronic | ICD-10-CM

## 2022-05-06 NOTE — TELEPHONE ENCOUNTER
Last ov 04/19/22    Future Appointments   Date Time Provider Yazan Willoughby   7/21/2022 11:30 AM Gasper Trivedi MD [facility]

## 2022-05-07 RX ORDER — LISINOPRIL AND HYDROCHLOROTHIAZIDE 20; 12.5 MG/1; MG/1
TABLET ORAL
Qty: 60 TABLET | Refills: 3 | Status: SHIPPED | OUTPATIENT
Start: 2022-05-07

## 2022-05-13 ENCOUNTER — TELEPHONE (OUTPATIENT)
Dept: INTERNAL MEDICINE CLINIC | Age: 67
End: 2022-05-13

## 2022-05-13 DIAGNOSIS — R41.3 MEMORY PROBLEM: Primary | ICD-10-CM

## 2022-05-13 NOTE — TELEPHONE ENCOUNTER
Ask Dr. Danette Sanchez, if he feels comfortable doing a letter. Since I have never seen her before, I am unable.

## 2022-05-13 NOTE — TELEPHONE ENCOUNTER
----- Message from Lis Hall sent at 5/13/2022  9:04 AM EDT -----  Subject: Message to Provider    QUESTIONS  Information for Provider? Pt's sister Evette Ignacio calling She is   taking over as caregiver for patient since her other sister passed away   who was her care giver They are finding where there were credit cards   opened up in patients name by the former caregiver. they need   documentation stated that patient has know mental disabilities in which   she is receiving disability payments for and could not consent to credit   cards   ---------------------------------------------------------------------------  --------------  Kim Chawla INFO  What is the best way for the office to contact you? OK to leave message on   voicemail  Preferred Call Back Phone Number? [phone]  ---------------------------------------------------------------------------  --------------  SCRIPT ANSWERS  Relationship to Patient? Sibling  Representative Name? Sister   Is the Representative on the appropriate HIPAA document in Epic?  Yes

## 2022-05-13 NOTE — TELEPHONE ENCOUNTER
Tom Garduno [address] [place] [address], [facility]  Phone: [phone]  Fax: [phone]    Referral placed for memory evaluation. I reviewed Dr. Maday Issa notes, but there was no mention of memory problems, so I am not able to write a letter at this time. However we can have her formally evaluated now and with the current evaluation could not write a letter stating what her condition is now and this is a condition that does not happen overnight and is slowly progressive. Call to schedule the appointment. Also schedule evaluation in the office for thyroid and B12 levels which can affect memory.

## 2022-05-13 NOTE — TELEPHONE ENCOUNTER
Pt sister was not happy about bringing her in to be seen when she is not due until July . She said she seen guerrero and would like to know he can do a letter for her just stating that she is not mentally able to apply or handle a credit card .

## 2022-05-13 NOTE — TELEPHONE ENCOUNTER
Nima Bolanos MD at 5/13/2022  2:14 PM    Status: Signed      [facility]   [id number], [address] [place] [address]  Phone: [phone]  Fax: [phone]   Bernadette's notes, but there was no mention of memory problems, so I am not able to write a letter at this time. However we can have her formally evaluated now and with the current evaluation could not write a letter stating what her condition is now and this is a condition that does not happen overnight and is slowly progressive. Call to schedule the appointment. Also schedule evaluation in the office for thyroid and B12 levels which can affect memory.

## 2022-06-16 ENCOUNTER — TELEPHONE (OUTPATIENT)
Dept: INTERNAL MEDICINE CLINIC | Age: 67
End: 2022-06-16

## 2022-06-16 RX ORDER — OXYBUTYNIN CHLORIDE 10 MG/1
TABLET, EXTENDED RELEASE ORAL
Qty: 90 TABLET | Refills: 3 | Status: SHIPPED | OUTPATIENT
Start: 2022-06-16

## 2022-06-16 RX ORDER — OXYBUTYNIN CHLORIDE 10 MG/1
TABLET, EXTENDED RELEASE ORAL
Qty: 90 TABLET | Refills: 3 | Status: SHIPPED | OUTPATIENT
Start: 2022-06-16 | End: 2022-06-16 | Stop reason: SDUPTHER

## 2022-06-16 NOTE — TELEPHONE ENCOUNTER
PT calling in for refill:      Created by  Oxybutynin Chloride TAKE ONE TABLET BY MOUTH DAILY      [facility] [id number] - [place], [address] [place] [address] [phone] - F [phone]

## 2022-07-06 DIAGNOSIS — K21.9 GASTROESOPHAGEAL REFLUX DISEASE, UNSPECIFIED WHETHER ESOPHAGITIS PRESENT: ICD-10-CM

## 2022-07-06 RX ORDER — ESOMEPRAZOLE MAGNESIUM 40 MG/1
CAPSULE, DELAYED RELEASE ORAL
Qty: 30 CAPSULE | Refills: 5 | Status: SHIPPED | OUTPATIENT
Start: 2022-07-06

## 2022-07-06 NOTE — TELEPHONE ENCOUNTER
Last office visit :04/19/2022    Future Appointments   Date Time Provider Yazan Willoughby   7/21/2022 11:30 AM Tatum Mallory MD [facility]

## 2022-07-11 ENCOUNTER — TELEPHONE (OUTPATIENT)
Dept: INTERNAL MEDICINE CLINIC | Age: 67
End: 2022-07-11

## 2022-07-11 DIAGNOSIS — E78.2 MIXED HYPERLIPIDEMIA: ICD-10-CM

## 2022-07-11 NOTE — TELEPHONE ENCOUNTER
Last office visit : 04/19/2022    Future Appointments   Date Time Provider Yazan Willoughby   7/21/2022 11:30 AM Eugene Jackson MD [facility]

## 2022-07-12 RX ORDER — ATORVASTATIN CALCIUM 20 MG/1
TABLET, FILM COATED ORAL
Qty: 90 TABLET | Refills: 1 | Status: SHIPPED | OUTPATIENT
Start: 2022-07-12 | End: 2022-07-12

## 2022-07-12 RX ORDER — ATORVASTATIN CALCIUM 20 MG/1
20 TABLET, FILM COATED ORAL DAILY
Qty: 90 TABLET | Refills: 1 | Status: SHIPPED | OUTPATIENT
Start: 2022-07-12

## 2022-07-21 ENCOUNTER — OFFICE VISIT (OUTPATIENT)
Dept: INTERNAL MEDICINE CLINIC | Age: 67
End: 2022-07-21
Payer: MEDICARE

## 2022-07-21 VITALS
HEIGHT: 63 IN | RESPIRATION RATE: 16 BRPM | SYSTOLIC BLOOD PRESSURE: 110 MMHG | WEIGHT: 195.4 LBS | HEART RATE: 74 BPM | BODY MASS INDEX: 34.62 KG/M2 | OXYGEN SATURATION: 99 % | DIASTOLIC BLOOD PRESSURE: 72 MMHG | TEMPERATURE: 97.4 F

## 2022-07-21 DIAGNOSIS — E78.2 MIXED HYPERLIPIDEMIA: ICD-10-CM

## 2022-07-21 DIAGNOSIS — F32.9 REACTIVE DEPRESSION: ICD-10-CM

## 2022-07-21 DIAGNOSIS — K21.9 GASTROESOPHAGEAL REFLUX DISEASE, UNSPECIFIED WHETHER ESOPHAGITIS PRESENT: ICD-10-CM

## 2022-07-21 DIAGNOSIS — N39.41 URGE INCONTINENCE: ICD-10-CM

## 2022-07-21 DIAGNOSIS — I10 ESSENTIAL HYPERTENSION: ICD-10-CM

## 2022-07-21 DIAGNOSIS — Z79.4 TYPE 2 DIABETES MELLITUS WITH COMPLICATION, WITH LONG-TERM CURRENT USE OF INSULIN (HCC): Primary | ICD-10-CM

## 2022-07-21 DIAGNOSIS — E11.8 TYPE 2 DIABETES MELLITUS WITH COMPLICATION, WITH LONG-TERM CURRENT USE OF INSULIN (HCC): Primary | ICD-10-CM

## 2022-07-21 DIAGNOSIS — Z23 NEED FOR PNEUMOCOCCAL VACCINATION: ICD-10-CM

## 2022-07-21 LAB — HBA1C MFR BLD: 5.6 %

## 2022-07-21 PROCEDURE — 1123F ACP DISCUSS/DSCN MKR DOCD: CPT | Performed by: INTERNAL MEDICINE

## 2022-07-21 PROCEDURE — 83036 HEMOGLOBIN GLYCOSYLATED A1C: CPT | Performed by: INTERNAL MEDICINE

## 2022-07-21 PROCEDURE — 3044F HG A1C LEVEL LT 7.0%: CPT | Performed by: INTERNAL MEDICINE

## 2022-07-21 PROCEDURE — 99214 OFFICE O/P EST MOD 30 MIN: CPT | Performed by: INTERNAL MEDICINE

## 2022-07-21 ASSESSMENT — ENCOUNTER SYMPTOMS
SORE THROAT: 0
VOMITING: 0
NAUSEA: 0
ABDOMINAL PAIN: 0
COUGH: 0
BLOOD IN STOOL: 0
SHORTNESS OF BREATH: 0

## 2022-07-21 NOTE — PROGRESS NOTES
Flakita Pride (:  1955) is a 78 y.o. female, Established patient, here for evaluation of the following chief complaint(s):  3 Month Follow-Up and Diabetes         ASSESSMENT/PLAN:  1. Type 2 diabetes mellitus with diabetic nephropathy, with long-term current use of insulin (HCC)  -Stable, reduce dose of  insulin glargine to 50 units daily  2. Essential hypertension  Stable continue current dose of amlodipine, lisinopril-hydrochlorothiazide. 3. Mixed hyperlipidemia  Stable, continue current dose of atorvastatin. 4. Gastroesophageal reflux disease, unspecified whether esophagitis present  Stable, continue current dose of esomeprazole  5. Urge incontinence  - Stable   - Continue current dose of  oxybutynin. 6. Reactive depression  Mood is not stable  Recommended to follow-up with psychiatrist.  7. Need for pneumococcal vaccination  -     pneumococcal 20-valent conjugat (PREVNAR) 0.5 ML CURLY inj; Inject 0.5 mLs into the muscle once for 1 dose, Disp-0.5 mL, R-0Normal      No follow-ups on file. Subjective   SUBJECTIVE/OBJECTIVE:  GERD:  Stable on PPI. Urge incontinence:  Patient takes oxybutynin at night. Depression:  As per the caretaker patient is depressed after the death of her sister, has seen psychologist before. She has episodes of depressed mood intermittently. Diabetes  She presents for her follow-up diabetic visit. She has type 2 diabetes mellitus. There are no hypoglycemic associated symptoms. Pertinent negatives for hypoglycemia include no dizziness. There are no diabetic associated symptoms. Pertinent negatives for diabetes include no chest pain, no fatigue and no weakness. There are no hypoglycemic complications. Diabetic complications include nephropathy. Risk factors for coronary artery disease include diabetes mellitus, dyslipidemia and hypertension. Current diabetic treatment includes insulin injections. She is compliant with treatment all of the time.  She has not had a previous visit with a dietitian. She rarely participates in exercise. An ACE inhibitor/angiotensin II receptor blocker is being taken. She does not see a podiatrist.Eye exam is current. Hypertension  This is a chronic problem. The current episode started more than 1 year ago. The problem is controlled. Pertinent negatives include no chest pain, palpitations or shortness of breath. Risk factors for coronary artery disease include diabetes mellitus and dyslipidemia. Past treatments include ACE inhibitors, diuretics and calcium channel blockers. The current treatment provides significant improvement. There are no compliance problems. Hypertensive end-organ damage includes CAD/MI. Hyperlipidemia  This is a chronic problem. The current episode started more than 1 year ago. The problem is controlled. Exacerbating diseases include diabetes. Pertinent negatives include no chest pain or shortness of breath. Current antihyperlipidemic treatment includes statins. The current treatment provides significant improvement of lipids. There are no compliance problems. Risk factors for coronary artery disease include diabetes mellitus, dyslipidemia and hypertension. Review of Systems   Constitutional:  Negative for fatigue and fever. HENT:  Negative for nosebleeds and sore throat. Respiratory:  Negative for cough and shortness of breath. Cardiovascular:  Negative for chest pain, palpitations and leg swelling. Gastrointestinal:  Negative for abdominal pain, blood in stool, nausea and vomiting. Neurological:  Negative for dizziness and weakness. Psychiatric/Behavioral:  Positive for dysphoric mood. Objective   Physical Exam  Constitutional:       Appearance: Normal appearance. Comments: Patient on wheel chair   HENT:      Head: Normocephalic and atraumatic. Eyes:      General: No scleral icterus.      Conjunctiva/sclera: Conjunctivae normal.   Cardiovascular:      Rate and Rhythm: Normal rate and regular rhythm. Pulses: Normal pulses. Heart sounds: Normal heart sounds. Pulmonary:      Effort: Pulmonary effort is normal.      Breath sounds: Normal breath sounds. Musculoskeletal:         General: No swelling. Skin:     General: Skin is warm and dry. Neurological:      Mental Status: She is alert and oriented to person, place, and time. Mental status is at baseline. Motor: Weakness present. Psychiatric:         Mood and Affect: Mood normal.         Behavior: Behavior normal.            An electronic signature was used to authenticate this note.     --Belinda Rico MD

## 2022-07-22 ENCOUNTER — TELEPHONE (OUTPATIENT)
Dept: INTERNAL MEDICINE CLINIC | Age: 67
End: 2022-07-22

## 2022-07-22 NOTE — TELEPHONE ENCOUNTER
----- Message from Kassandra Cortez MD sent at 7/22/2022  8:16 AM EDT -----  Diabetes is well controlled.   Advised patient to reduce dose of  insulin glargine to 50 units daily

## 2022-10-26 ENCOUNTER — OFFICE VISIT (OUTPATIENT)
Dept: INTERNAL MEDICINE CLINIC | Age: 67
End: 2022-10-26
Payer: MEDICARE

## 2022-10-26 VITALS
WEIGHT: 180 LBS | BODY MASS INDEX: 31.89 KG/M2 | TEMPERATURE: 97.7 F | OXYGEN SATURATION: 98 % | SYSTOLIC BLOOD PRESSURE: 118 MMHG | HEART RATE: 77 BPM | DIASTOLIC BLOOD PRESSURE: 66 MMHG

## 2022-10-26 DIAGNOSIS — L98.9 SKIN LESIONS: ICD-10-CM

## 2022-10-26 DIAGNOSIS — Z79.4 TYPE 2 DIABETES MELLITUS WITH COMPLICATION, WITH LONG-TERM CURRENT USE OF INSULIN (HCC): ICD-10-CM

## 2022-10-26 DIAGNOSIS — I10 ESSENTIAL HYPERTENSION: ICD-10-CM

## 2022-10-26 DIAGNOSIS — K21.9 GASTROESOPHAGEAL REFLUX DISEASE, UNSPECIFIED WHETHER ESOPHAGITIS PRESENT: ICD-10-CM

## 2022-10-26 DIAGNOSIS — F79 MENTAL DISABILITY: ICD-10-CM

## 2022-10-26 DIAGNOSIS — E78.2 MIXED HYPERLIPIDEMIA: ICD-10-CM

## 2022-10-26 DIAGNOSIS — E11.8 TYPE 2 DIABETES MELLITUS WITH COMPLICATION, WITH LONG-TERM CURRENT USE OF INSULIN (HCC): ICD-10-CM

## 2022-10-26 DIAGNOSIS — F32.9 REACTIVE DEPRESSION: ICD-10-CM

## 2022-10-26 DIAGNOSIS — N39.41 URGE INCONTINENCE: ICD-10-CM

## 2022-10-26 DIAGNOSIS — I69.359 HEMIPARESIS AFFECTING DOMINANT SIDE AS LATE EFFECT OF CEREBROVASCULAR ACCIDENT (HCC): ICD-10-CM

## 2022-10-26 DIAGNOSIS — R42 DIZZINESS: ICD-10-CM

## 2022-10-26 DIAGNOSIS — Z00.00 MEDICARE ANNUAL WELLNESS VISIT, SUBSEQUENT: Primary | ICD-10-CM

## 2022-10-26 PROCEDURE — G8484 FLU IMMUNIZE NO ADMIN: HCPCS | Performed by: INTERNAL MEDICINE

## 2022-10-26 PROCEDURE — 1090F PRES/ABSN URINE INCON ASSESS: CPT | Performed by: INTERNAL MEDICINE

## 2022-10-26 PROCEDURE — 99214 OFFICE O/P EST MOD 30 MIN: CPT | Performed by: INTERNAL MEDICINE

## 2022-10-26 PROCEDURE — 3017F COLORECTAL CA SCREEN DOC REV: CPT | Performed by: INTERNAL MEDICINE

## 2022-10-26 PROCEDURE — 0509F URINE INCON PLAN DOCD: CPT | Performed by: INTERNAL MEDICINE

## 2022-10-26 PROCEDURE — G8400 PT W/DXA NO RESULTS DOC: HCPCS | Performed by: INTERNAL MEDICINE

## 2022-10-26 PROCEDURE — 36415 COLL VENOUS BLD VENIPUNCTURE: CPT | Performed by: INTERNAL MEDICINE

## 2022-10-26 PROCEDURE — 3074F SYST BP LT 130 MM HG: CPT | Performed by: INTERNAL MEDICINE

## 2022-10-26 PROCEDURE — 1036F TOBACCO NON-USER: CPT | Performed by: INTERNAL MEDICINE

## 2022-10-26 PROCEDURE — G0439 PPPS, SUBSEQ VISIT: HCPCS | Performed by: INTERNAL MEDICINE

## 2022-10-26 PROCEDURE — G8427 DOCREV CUR MEDS BY ELIG CLIN: HCPCS | Performed by: INTERNAL MEDICINE

## 2022-10-26 PROCEDURE — G8417 CALC BMI ABV UP PARAM F/U: HCPCS | Performed by: INTERNAL MEDICINE

## 2022-10-26 PROCEDURE — 3044F HG A1C LEVEL LT 7.0%: CPT | Performed by: INTERNAL MEDICINE

## 2022-10-26 PROCEDURE — 1123F ACP DISCUSS/DSCN MKR DOCD: CPT | Performed by: INTERNAL MEDICINE

## 2022-10-26 PROCEDURE — 2022F DILAT RTA XM EVC RTNOPTHY: CPT | Performed by: INTERNAL MEDICINE

## 2022-10-26 PROCEDURE — 3078F DIAST BP <80 MM HG: CPT | Performed by: INTERNAL MEDICINE

## 2022-10-26 ASSESSMENT — PATIENT HEALTH QUESTIONNAIRE - PHQ9
SUM OF ALL RESPONSES TO PHQ9 QUESTIONS 1 & 2: 3
10. IF YOU CHECKED OFF ANY PROBLEMS, HOW DIFFICULT HAVE THESE PROBLEMS MADE IT FOR YOU TO DO YOUR WORK, TAKE CARE OF THINGS AT HOME, OR GET ALONG WITH OTHER PEOPLE: 0
7. TROUBLE CONCENTRATING ON THINGS, SUCH AS READING THE NEWSPAPER OR WATCHING TELEVISION: 0
6. FEELING BAD ABOUT YOURSELF - OR THAT YOU ARE A FAILURE OR HAVE LET YOURSELF OR YOUR FAMILY DOWN: 0
5. POOR APPETITE OR OVEREATING: 1
3. TROUBLE FALLING OR STAYING ASLEEP: 0
9. THOUGHTS THAT YOU WOULD BE BETTER OFF DEAD, OR OF HURTING YOURSELF: 0
1. LITTLE INTEREST OR PLEASURE IN DOING THINGS: 0
SUM OF ALL RESPONSES TO PHQ QUESTIONS 1-9: 8
8. MOVING OR SPEAKING SO SLOWLY THAT OTHER PEOPLE COULD HAVE NOTICED. OR THE OPPOSITE, BEING SO FIGETY OR RESTLESS THAT YOU HAVE BEEN MOVING AROUND A LOT MORE THAN USUAL: 1
2. FEELING DOWN, DEPRESSED OR HOPELESS: 3
SUM OF ALL RESPONSES TO PHQ QUESTIONS 1-9: 8
SUM OF ALL RESPONSES TO PHQ QUESTIONS 1-9: 8
4. FEELING TIRED OR HAVING LITTLE ENERGY: 3
SUM OF ALL RESPONSES TO PHQ QUESTIONS 1-9: 8

## 2022-10-26 ASSESSMENT — LIFESTYLE VARIABLES
HOW MANY STANDARD DRINKS CONTAINING ALCOHOL DO YOU HAVE ON A TYPICAL DAY: PATIENT DOES NOT DRINK
HOW OFTEN DO YOU HAVE A DRINK CONTAINING ALCOHOL: NEVER

## 2022-10-26 NOTE — PROGRESS NOTES
Medicare Annual Wellness Visit    Ephraim Abarca is here for Medicare AWV, Dizziness (Been going on for the past 3-4 days), and Depression (Sister  last year, \"seems like its just now affecting her\")    Assessment & Plan   Medicare annual wellness visit, subsequent    Dizziness  Acute, intermittent  Etiology is unclear   Probably due to dehydration  Advised patient to increase water intake  Ordered following labs for further workup, will treat based on the results. -     Comprehensive Metabolic Panel; Future  -     Magnesium; Future  -     CBC with Auto Differential; Future    Skin lesions  Chronic, worsening  Referring to dermatology for further workup   -     Lety Brower MD, Dermatology, [place]    Hemiparesis affecting dominant side as late effect of cerebrovascular accident ([facility] [address]  - Stable   - Continue current dose of atorvastatin     Type 2 diabetes mellitus with complication, with long-term current use of insulin ([address]  Improving   Recommended to reduce the dose of insulin glargine to 40 units daily if HbA1c is less than 6  -     Hemoglobin A1C; Future    Mixed hyperlipidemia  Stable  continue current dose of atorvastatin.  -     TSH with Reflex; Future    Mental disability  Chronic problem  Patient has developmental disabilities and is taken care by her family     Essential hypertension  Stable   continue current dose of amlodipine, lisinopril-hydrochlorothiazide. Gastroesophageal reflux disease, unspecified whether esophagitis present  Stable  continue current dose of esomeprazole    Urge incontinence  Stable   Continue current dose of  oxybutynin. Reactive depression  Patient is depressed since the death of her sister. Referred to psychiatrist as per the request of her care taker.             Recommendations for Preventive Services Due: see orders and patient instructions/AVS.  Recommended screening schedule for the next 5-10 years is provided to the patient in written form: see Patient Instructions/AVS.     Return in 3 months (on 1/26/2023) for Medicare Annual Wellness Visit in 1 year. Subjective   The following acute and/or chronic problems were also addressed today:  Dizziness    Skin lesions    Hemiparesis affecting dominant side as late effect of cerebrovascular accident ([facility] [address]    Type 2 diabetes mellitus with complication, with long-term current use of insulin ([facility] [address]    Mixed hyperlipidemia    Essential hypertension    Gastroesophageal reflux disease, unspecified whether esophagitis present    Urge incontinence    Reactive depression    Review of Systems   Constitutional:  Negative for fatigue and fever. HENT:  Negative for nosebleeds and sore throat. Respiratory:  Negative for cough and shortness of breath. Cardiovascular:  Negative for chest pain, palpitations and leg swelling. Gastrointestinal:  Negative for abdominal pain, blood in stool, nausea and vomiting. Neurological:  Positive for dizziness. Negative for weakness. Psychiatric/Behavioral:  Positive for dysphoric mood. Patient's complete Health Risk Assessment and screening values have been reviewed and are found in Flowsheets. The following problems were reviewed today and where indicated follow up appointments were made and/or referrals ordered. Positive Risk Factor Screenings with Interventions:    Fall Risk:  Do you feel unsteady or are you worried about falling? : (!) yes  2 or more falls in past year?: no  Fall with injury in past year?: no   Fall Risk Interventions:    Patient has dizziness, treating the problem as mentioned above. Cognitive: Words recalled: 0 Words Recalled  Total Score Interpretation: Abnormal Mini-Cog  Did the patient refuse to take the cognition test?: No  Cognitive Impairment Interventions:  Patient has developmental delays from childhood.     Depression:  PHQ-2 Score: 3  PHQ-9 Total Score: 8    Severity:1-4 = minimal depression, 5-9 = mild depression, 10-14 = moderate depression, 15-19 = moderately severe depression, 20-27 = severe depression  Depression Interventions:  Psychiatry referral ordered for further evaluation/treatment          General Health and ACP:  General  In general, how would you say your health is?: Fair  In the past 7 days, have you experienced any of the following: New or Increased Pain, New or Increased Fatigue, Loneliness, Social Isolation, Stress or Anger?: (!) Yes  Select all that apply: (!) Stress, Anger  Do you get the social and emotional support that you need?: (!) No  Do you have a Living Will?: (!) No    Advance Directives       Power of [facility] Will ACP-Advance Directive ACP-Power of     Not on File Not on File Not on File Not on File          General Health Risk Interventions:  Stress: psychiatry referral ordered for further evaluation/treatment  Anger: psychiatry referral ordered for further evaluation/treatment    Health Habits/Nutrition:  Physical Activity: Inactive    Days of Exercise per Week: 0 days    Minutes of Exercise per Session: 0 min     Have you lost any weight without trying in the past 3 months?: No     Have you seen the dentist within the past year?: (!) No  Health Habits/Nutrition Interventions:  Inadequate physical activity:  patient is not ready to increase his/her physical activity level at this time    Hearing/Vision:  Do you or your family notice any trouble with your hearing that hasn't been managed with hearing aids?: No  Do you have difficulty driving, watching TV, or doing any of your daily activities because of your eyesight?: No  Have you had an eye exam within the past year?: (!) No  No results found.   Hearing/Vision Interventions:  Vision concerns:  patient encouraged to make appointment with his/her eye specialist    Safety:  Do you have working smoke detectors?: Yes  Do you have any tripping hazards - loose or unsecured carpets or rugs?: No  Do you have any tripping hazards - clutter in doorways, halls, or stairs?: No  Do you have either shower bars, grab bars, non-slip mats or non-slip surfaces in your shower or bathtub?: (!) No (she has a chair to sit in)  Do all of your stairways have a railing or banister?: Yes  Do you always fasten your seatbelt when you are in a car?: Yes  Safety Interventions:  Home safety tips provided    ADLs:  In the past 7 days, did you need help from others to perform any of the following everyday activities: Eating, dressing, grooming, bathing, toileting, or walking/balance?: No  In the past 7 days, did you need help from others to take care of any of the following: Laundry, housekeeping, banking/finances, shopping, telephone use, food preparation, transportation, or taking medications?: (!) Yes  Select all that apply: (!) Taking Medications  ADL Interventions:  Referred for Care Coordination          Objective   Vitals:    10/26/22 1120   BP: 118/66   Site: Left Upper Arm   Position: Sitting   Cuff Size: Large Adult   Pulse: 77   Temp: 97.7 °F (36.5 °C)   TempSrc: Temporal   SpO2: 98%   Weight: 180 lb (81.6 kg)      Body mass index is 31.89 kg/m². Physical Exam  Constitutional:       Appearance: Normal appearance. Comments: Patient on wheel chair   HENT:      Head: Normocephalic and atraumatic. Eyes:      General: No scleral icterus. Conjunctiva/sclera: Conjunctivae normal.   Cardiovascular:      Rate and Rhythm: Normal rate and regular rhythm. Pulses: Normal pulses. Heart sounds: Normal heart sounds. Pulmonary:      Effort: Pulmonary effort is normal.      Breath sounds: Normal breath sounds. Musculoskeletal:         General: No swelling. Skin:     General: Skin is warm and dry. Neurological:      Mental Status: She is alert and oriented to person, place, and time. Mental status is at baseline. Motor: Weakness present.    Psychiatric:         Mood and Affect: Mood normal.         Behavior: Behavior normal.            Allergies   Allergen Reactions    Ampicillin     Penicillins      Prior to Visit Medications    Medication Sig Taking? Authorizing Provider   atorvastatin (LIPITOR) 20 MG tablet Take 1 tablet by mouth daily Need apt for refill. Yes Warden Lyndon MD   esomeprazole (RIISnet) 40 MG delayed release capsule TAKE ONE CAPSULE BY MOUTH EVERY MORNING BEFORE BREAKFAST Yes Warden Lyndon MD   oxybutynin (DITROPAN-XL) 10 MG extended release tablet TAKE ONE TABLET BY MOUTH DAILY Yes Warden Lyndon MD   lisinopril-hydroCHLOROthiazide (PRINZIDE;ZESTORETIC) 20-12.5 MG per tablet TAKE ONE TABLET BY MOUTH TWICE A DAY Yes Clay Ansari MD   Insulin Syringe-Needle U-100 (BD VEO INSULIN SYRINGE U/F) 31G X 15/64\" 0.5 ML MISC USE WITH INSULIN THREE TIMES A DAY Yes Warden Lyndon MD   amLODIPine (NORVASC) 5 MG tablet TAKE ONE TABLET BY MOUTH DAILY Yes Clay Ansari MD   insulin glargine (LANTUS) 100 UNIT/ML injection vial INJECT UNDER THE SKIN 60 UNITS EVERY NIGHT AT BEDTIME  Patient taking differently: Inject 50 Units into the skin INJECT UNDER THE SKIN 50 UNITS EVERY NIGHT AT BEDTIME (per note 07/22/2022) Yes Warden Lyndon MD   furosemide (LASIX) 40 MG tablet Take 1 tablet by mouth daily Yes Clay Ansari MD   potassium chloride (KLOR-CON) 20 MEQ packet Take 20 mEq by mouth 2 times daily Yes Historical Provider, MD   KLOR-CON M20 20 MEQ extended release tablet  Yes Historical Provider, MD   calcium carbonate 600 MG TABS tablet Take 1 tablet by mouth daily Yes Historical Provider, MD   Multiple Vitamins-Minerals (THERAPEUTIC MULTIVITAMIN-MINERALS) tablet Take 1 tablet by mouth daily Yes Historical Provider, MD   Accu-Chek Softclix Lancets MISC USE TO TEST BLOOD SUGAR TWO TIMES A DAY Yes Moises Ruvalcaba MD   betamethasone dipropionate (DIPROLENE) 0.05 % ointment Apply topically daily.  Yes Darryn Felder MD   Blood Glucose Monitoring Suppl (ACCU-CHEK MOOSE CONNECT) W/DEVICE KIT 1 kit by Does not apply route daily Yes Shahla Bar MD   ACCU-CHEK MOOSE PLUS strip USE TO TEST BLOOD SUGAR TWO TIMES A DAY AS NEEDED  Kayla Amin MD       [facility] (Including outside providers/suppliers regularly involved in providing care):   Patient Care Team:  Merlyn Navas MD as PCP - General (Internal Medicine)  Merlyn Navas MD as PCP - [facility] Empaneled Provider     Reviewed and updated this visit:  Tobacco  Allergies  Meds  Problems  Med Hx  Surg Hx  Soc Hx  Fam Hx

## 2022-10-26 NOTE — PATIENT INSTRUCTIONS
If HbA1c is less than 6 reduce Insulin Lantus to 40 units at night. Personalized Preventive Plan for Harris Hanks - 10/26/2022  Medicare offers a range of preventive health benefits. Some of the tests and screenings are paid in full while other may be subject to a deductible, co-insurance, and/or copay. Some of these benefits include a comprehensive review of your medical history including lifestyle, illnesses that may run in your family, and various assessments and screenings as appropriate. After reviewing your medical record and screening and assessments performed today your provider may have ordered immunizations, labs, imaging, and/or referrals for you. A list of these orders (if applicable) as well as your Preventive Care list are included within your After Visit Summary for your review. Other Preventive Recommendations:    A preventive eye exam performed by an eye specialist is recommended every 1-2 years to screen for glaucoma; cataracts, macular degeneration, and other eye disorders. A preventive dental visit is recommended every 6 months. Try to get at least 150 minutes of exercise per week or 10,000 steps per day on a pedometer . Order or download the FREE \"Exercise & Physical Activity: Your Everyday Guide\" from [employer]. Call 9-851.873.6946 or search [employer] online. You need 6848-2997 mg of calcium and 7577-1721 IU of vitamin D per day. It is possible to meet your calcium requirement with diet alone, but a vitamin D supplement is usually necessary to meet this goal.  When exposed to the sun, use a sunscreen that protects against both UVA and UVB radiation with an SPF of 30 or greater. Reapply every 2 to 3 hours or after sweating, drying off with a towel, or swimming. Always wear a seat belt when traveling in a car. Always wear a helmet when riding a bicycle or motorcycle.

## 2022-10-27 ENCOUNTER — TELEPHONE (OUTPATIENT)
Dept: INTERNAL MEDICINE CLINIC | Age: 67
End: 2022-10-27

## 2022-10-27 LAB
BASOPHILS ABSOLUTE: 0.1 K/UL (ref 0–0.2)
BASOPHILS RELATIVE PERCENT: 0.9 %
EOSINOPHILS ABSOLUTE: 0.1 K/UL (ref 0–0.6)
EOSINOPHILS RELATIVE PERCENT: 0.5 %
ESTIMATED AVERAGE GLUCOSE: 116.9 MG/DL
HBA1C MFR BLD: 5.7 %
HCT VFR BLD CALC: 39.2 % (ref 36–48)
HEMOGLOBIN: 13.1 G/DL (ref 12–16)
LYMPHOCYTES ABSOLUTE: 1.4 K/UL (ref 1–5.1)
LYMPHOCYTES RELATIVE PERCENT: 11.2 %
MAGNESIUM: 1.8 MG/DL (ref 1.8–2.4)
MCH RBC QN AUTO: 28.7 PG (ref 26–34)
MCHC RBC AUTO-ENTMCNC: 33.4 G/DL (ref 31–36)
MCV RBC AUTO: 86 FL (ref 80–100)
MONOCYTES ABSOLUTE: 0.5 K/UL (ref 0–1.3)
MONOCYTES RELATIVE PERCENT: 3.7 %
NEUTROPHILS ABSOLUTE: 10.4 K/UL (ref 1.7–7.7)
NEUTROPHILS RELATIVE PERCENT: 83.7 %
PDW BLD-RTO: 13.5 % (ref 12.4–15.4)
PLATELET # BLD: 292 K/UL (ref 135–450)
PMV BLD AUTO: 9.7 FL (ref 5–10.5)
RBC # BLD: 4.55 M/UL (ref 4–5.2)
TSH REFLEX: 3.63 UIU/ML (ref 0.27–4.2)
WBC # BLD: 12.5 K/UL (ref 4–11)

## 2022-10-27 NOTE — TELEPHONE ENCOUNTER
Patient's sister Lonnie Marquez said someone called her but si not leave their name. PLease call her back.

## 2022-10-28 ENCOUNTER — TELEPHONE (OUTPATIENT)
Dept: INTERNAL MEDICINE CLINIC | Age: 67
End: 2022-10-28

## 2022-10-28 DIAGNOSIS — R42 DIZZINESS: Primary | ICD-10-CM

## 2022-10-28 RX ORDER — NITROFURANTOIN 25; 75 MG/1; MG/1
100 CAPSULE ORAL 2 TIMES DAILY
Qty: 10 CAPSULE | Refills: 0 | Status: SHIPPED | OUTPATIENT
Start: 2022-10-28 | End: 2022-11-02

## 2022-10-28 NOTE — TELEPHONE ENCOUNTER
Patient might probably have UTI. Please advise her to provide urine for Routine with reflex to culture. I'm sending a course of nitrofurantoin to her pharmacy. She can start taking it after providing the urine sample and increase water intake.

## 2022-10-28 NOTE — TELEPHONE ENCOUNTER
Pts sister called again regarding results wants to know what can be done about dizziness    Wants call back asap

## 2022-10-30 ASSESSMENT — ENCOUNTER SYMPTOMS
NAUSEA: 0
BLOOD IN STOOL: 0
VOMITING: 0
SORE THROAT: 0
SHORTNESS OF BREATH: 0
COUGH: 0
ABDOMINAL PAIN: 0

## 2022-11-01 ENCOUNTER — TELEPHONE (OUTPATIENT)
Dept: INTERNAL MEDICINE CLINIC | Age: 67
End: 2022-11-01

## 2022-11-01 ENCOUNTER — NURSE ONLY (OUTPATIENT)
Dept: INTERNAL MEDICINE CLINIC | Age: 67
End: 2022-11-01
Payer: MEDICARE

## 2022-11-01 DIAGNOSIS — R42 DIZZINESS: Primary | ICD-10-CM

## 2022-11-01 LAB
BACTERIA: ABNORMAL /HPF
BILIRUBIN URINE: NEGATIVE
BLOOD, URINE: NEGATIVE
CLARITY: CLEAR
COLOR: YELLOW
EPITHELIAL CELLS, UA: 7 /HPF (ref 0–5)
GLUCOSE URINE: NEGATIVE MG/DL
HYALINE CASTS: 4 /LPF (ref 0–8)
KETONES, URINE: NEGATIVE MG/DL
LEUKOCYTE ESTERASE, URINE: ABNORMAL
MICROSCOPIC EXAMINATION: YES
NITRITE, URINE: NEGATIVE
PH UA: 7.5 (ref 5–8)
PROTEIN UA: ABNORMAL MG/DL
RBC UA: 2 /HPF (ref 0–4)
SPECIFIC GRAVITY UA: 1.01 (ref 1–1.03)
URINE REFLEX TO CULTURE: ABNORMAL
URINE TYPE: ABNORMAL
UROBILINOGEN, URINE: 0.2 E.U./DL
WBC UA: 3 /HPF (ref 0–5)

## 2022-11-01 PROCEDURE — 81003 URINALYSIS AUTO W/O SCOPE: CPT | Performed by: INTERNAL MEDICINE

## 2022-11-01 PROCEDURE — 99999 PR OFFICE/OUTPT VISIT,PROCEDURE ONLY: CPT | Performed by: INTERNAL MEDICINE

## 2022-11-01 NOTE — TELEPHONE ENCOUNTER
She has been on the medication for 2 days , and she is still dizzy. They are going to bring in a urine sample today.

## 2022-11-07 DIAGNOSIS — I10 ESSENTIAL HYPERTENSION: Chronic | ICD-10-CM

## 2022-11-08 RX ORDER — LISINOPRIL AND HYDROCHLOROTHIAZIDE 20; 12.5 MG/1; MG/1
TABLET ORAL
Qty: 60 TABLET | Refills: 3 | Status: SHIPPED | OUTPATIENT
Start: 2022-11-08

## 2022-11-11 ENCOUNTER — TELEPHONE (OUTPATIENT)
Dept: INTERNAL MEDICINE CLINIC | Age: 67
End: 2022-11-11

## 2022-11-11 DIAGNOSIS — R42 DIZZINESS: Primary | ICD-10-CM

## 2022-11-11 NOTE — TELEPHONE ENCOUNTER
She is still dizzy, when she moves her head it is worst,  head congestion, what would you like to do.  Please call them back [phone]

## 2022-11-14 NOTE — TELEPHONE ENCOUNTER
Called and spoke with the patients sister and she says the head congestion is not the issue more than the dizziness. Didn't know if she would still need to see the ENT or not.

## 2022-12-21 RX ORDER — OXYBUTYNIN CHLORIDE 10 MG/1
TABLET, EXTENDED RELEASE ORAL
Qty: 90 TABLET | Refills: 3 | Status: SHIPPED | OUTPATIENT
Start: 2022-12-21

## 2023-01-02 DIAGNOSIS — K21.9 GASTROESOPHAGEAL REFLUX DISEASE, UNSPECIFIED WHETHER ESOPHAGITIS PRESENT: ICD-10-CM

## 2023-01-03 RX ORDER — ESOMEPRAZOLE MAGNESIUM 40 MG/1
CAPSULE, DELAYED RELEASE ORAL
Qty: 90 CAPSULE | Refills: 1 | Status: SHIPPED | OUTPATIENT
Start: 2023-01-03

## 2023-01-03 NOTE — TELEPHONE ENCOUNTER
Future Appointments   Date Time Provider Yazan Willoughby   1/26/2023 11:30 AM Nilda Ray MD [address]     Last appt on 10.26.2022

## 2023-01-30 ENCOUNTER — OFFICE VISIT (OUTPATIENT)
Dept: INTERNAL MEDICINE CLINIC | Age: 68
End: 2023-01-30
Payer: MEDICARE

## 2023-01-30 VITALS
RESPIRATION RATE: 16 BRPM | BODY MASS INDEX: 33.84 KG/M2 | HEART RATE: 68 BPM | SYSTOLIC BLOOD PRESSURE: 118 MMHG | OXYGEN SATURATION: 97 % | HEIGHT: 63 IN | WEIGHT: 191 LBS | DIASTOLIC BLOOD PRESSURE: 62 MMHG | TEMPERATURE: 97.4 F

## 2023-01-30 DIAGNOSIS — I69.359 HEMIPARESIS AFFECTING DOMINANT SIDE AS LATE EFFECT OF CEREBROVASCULAR ACCIDENT (HCC): ICD-10-CM

## 2023-01-30 DIAGNOSIS — N39.41 URGE INCONTINENCE: ICD-10-CM

## 2023-01-30 DIAGNOSIS — I10 ESSENTIAL HYPERTENSION: ICD-10-CM

## 2023-01-30 DIAGNOSIS — F33.1 MODERATE EPISODE OF RECURRENT MAJOR DEPRESSIVE DISORDER (HCC): ICD-10-CM

## 2023-01-30 DIAGNOSIS — Z79.4 TYPE 2 DIABETES MELLITUS WITH COMPLICATION, WITH LONG-TERM CURRENT USE OF INSULIN (HCC): Primary | ICD-10-CM

## 2023-01-30 DIAGNOSIS — E78.2 MIXED HYPERLIPIDEMIA: ICD-10-CM

## 2023-01-30 DIAGNOSIS — K21.9 GASTROESOPHAGEAL REFLUX DISEASE, UNSPECIFIED WHETHER ESOPHAGITIS PRESENT: ICD-10-CM

## 2023-01-30 DIAGNOSIS — E11.8 TYPE 2 DIABETES MELLITUS WITH COMPLICATION, WITH LONG-TERM CURRENT USE OF INSULIN (HCC): Primary | ICD-10-CM

## 2023-01-30 LAB — HBA1C MFR BLD: 6.4 %

## 2023-01-30 PROCEDURE — G8484 FLU IMMUNIZE NO ADMIN: HCPCS | Performed by: INTERNAL MEDICINE

## 2023-01-30 PROCEDURE — G8417 CALC BMI ABV UP PARAM F/U: HCPCS | Performed by: INTERNAL MEDICINE

## 2023-01-30 PROCEDURE — G8427 DOCREV CUR MEDS BY ELIG CLIN: HCPCS | Performed by: INTERNAL MEDICINE

## 2023-01-30 PROCEDURE — 2022F DILAT RTA XM EVC RTNOPTHY: CPT | Performed by: INTERNAL MEDICINE

## 2023-01-30 PROCEDURE — 1036F TOBACCO NON-USER: CPT | Performed by: INTERNAL MEDICINE

## 2023-01-30 PROCEDURE — 3074F SYST BP LT 130 MM HG: CPT | Performed by: INTERNAL MEDICINE

## 2023-01-30 PROCEDURE — 99214 OFFICE O/P EST MOD 30 MIN: CPT | Performed by: INTERNAL MEDICINE

## 2023-01-30 PROCEDURE — 1123F ACP DISCUSS/DSCN MKR DOCD: CPT | Performed by: INTERNAL MEDICINE

## 2023-01-30 PROCEDURE — G8400 PT W/DXA NO RESULTS DOC: HCPCS | Performed by: INTERNAL MEDICINE

## 2023-01-30 PROCEDURE — 3046F HEMOGLOBIN A1C LEVEL >9.0%: CPT | Performed by: INTERNAL MEDICINE

## 2023-01-30 PROCEDURE — 0509F URINE INCON PLAN DOCD: CPT | Performed by: INTERNAL MEDICINE

## 2023-01-30 PROCEDURE — 83036 HEMOGLOBIN GLYCOSYLATED A1C: CPT | Performed by: INTERNAL MEDICINE

## 2023-01-30 PROCEDURE — 1090F PRES/ABSN URINE INCON ASSESS: CPT | Performed by: INTERNAL MEDICINE

## 2023-01-30 PROCEDURE — 3078F DIAST BP <80 MM HG: CPT | Performed by: INTERNAL MEDICINE

## 2023-01-30 PROCEDURE — 3017F COLORECTAL CA SCREEN DOC REV: CPT | Performed by: INTERNAL MEDICINE

## 2023-01-30 RX ORDER — OXYBUTYNIN CHLORIDE 10 MG/1
TABLET, EXTENDED RELEASE ORAL
Qty: 90 TABLET | Refills: 1 | Status: SHIPPED | OUTPATIENT
Start: 2023-01-30

## 2023-01-30 RX ORDER — ESCITALOPRAM OXALATE 5 MG/1
5 TABLET ORAL DAILY
Qty: 30 TABLET | Refills: 5 | Status: SHIPPED | OUTPATIENT
Start: 2023-01-30

## 2023-01-30 ASSESSMENT — ENCOUNTER SYMPTOMS
COUGH: 0
BLOOD IN STOOL: 0
NAUSEA: 0
VOMITING: 0
SHORTNESS OF BREATH: 0
SORE THROAT: 0
ABDOMINAL PAIN: 0

## 2023-01-30 ASSESSMENT — PATIENT HEALTH QUESTIONNAIRE - PHQ9
3. TROUBLE FALLING OR STAYING ASLEEP: 0
4. FEELING TIRED OR HAVING LITTLE ENERGY: 1
9. THOUGHTS THAT YOU WOULD BE BETTER OFF DEAD, OR OF HURTING YOURSELF: 0
10. IF YOU CHECKED OFF ANY PROBLEMS, HOW DIFFICULT HAVE THESE PROBLEMS MADE IT FOR YOU TO DO YOUR WORK, TAKE CARE OF THINGS AT HOME, OR GET ALONG WITH OTHER PEOPLE: 2
SUM OF ALL RESPONSES TO PHQ9 QUESTIONS 1 & 2: 6
SUM OF ALL RESPONSES TO PHQ QUESTIONS 1-9: 8
SUM OF ALL RESPONSES TO PHQ QUESTIONS 1-9: 8
6. FEELING BAD ABOUT YOURSELF - OR THAT YOU ARE A FAILURE OR HAVE LET YOURSELF OR YOUR FAMILY DOWN: 0
SUM OF ALL RESPONSES TO PHQ QUESTIONS 1-9: 8
2. FEELING DOWN, DEPRESSED OR HOPELESS: 3
SUM OF ALL RESPONSES TO PHQ QUESTIONS 1-9: 8
1. LITTLE INTEREST OR PLEASURE IN DOING THINGS: 3
5. POOR APPETITE OR OVEREATING: 0
8. MOVING OR SPEAKING SO SLOWLY THAT OTHER PEOPLE COULD HAVE NOTICED. OR THE OPPOSITE, BEING SO FIGETY OR RESTLESS THAT YOU HAVE BEEN MOVING AROUND A LOT MORE THAN USUAL: 0
7. TROUBLE CONCENTRATING ON THINGS, SUCH AS READING THE NEWSPAPER OR WATCHING TELEVISION: 1

## 2023-01-30 NOTE — PROGRESS NOTES
Gilles Arcos (:  1955) is a 78 y.o. female, Established patient, here for evaluation of the following chief complaint(s):  Follow-up Chronic Condition and Diabetes         ASSESSMENT/PLAN:  1. Type 2 diabetes mellitus with complication, with long-term current use of insulin (HCC)  - Stable   - Continue current dose of insulin glargine  -     Microalbumin / Creatinine Urine Ratio  -      DIABETES FOOT EXAM  -     POCT glycosylated hemoglobin (Hb A1C)  2. Urge incontinence  Chronic  - Stable   - Continue current dose of  oxybutynin. -     oxybutynin (DITROPAN-XL) 10 MG extended release tablet; TAKE ONE TABLET BY MOUTH DAILY, Disp-90 tablet, R-1Normal  3. Hemiparesis affecting dominant side as late effect of cerebrovascular accident ([facility] [address]  - Stable   - Continue current dose of atorvastatin   4. Moderate episode of recurrent major depressive disorder (HCC)  Chronic, uncontrolled. Patient saw psychologist before and there is no improvement in symptoms. Patient and caregiver are willing to start pharmacotherapy for depression. Starting on Lexapro after discussing with both of them. -     escitalopram (LEXAPRO) 5 MG tablet; Take 1 tablet by mouth daily, Disp-30 tablet, R-5Normal  5. Mixed hyperlipidemia  - Stable   - Continue current dose of atorvastatin   6. Essential hypertension  Stable   continue current dose of amlodipine, lisinopril-hydrochlorothiazide. 7.  Gastroesophageal reflux disease, unspecified whether esophagitis present:  - Stable   - Continue current dose of Nexium  Return in about 4 weeks (around 2023). Subjective   SUBJECTIVE/OBJECTIVE:  GERD:  Stable on PPI. Urge incontinence:  Patient takes oxybutynin at night. Depression:  As per the caretaker patient is depressed after the death of her sister, has seen psychologist before. She has episodes of depressed mood intermittently. Diabetes  She presents for her follow-up diabetic visit.  She has type 2 diabetes mellitus. There are no hypoglycemic associated symptoms. Pertinent negatives for hypoglycemia include no dizziness. There are no diabetic associated symptoms. Pertinent negatives for diabetes include no chest pain, no fatigue and no weakness. There are no hypoglycemic complications. Diabetic complications include nephropathy. Risk factors for coronary artery disease include diabetes mellitus, dyslipidemia and hypertension. Current diabetic treatment includes insulin injections. She is compliant with treatment all of the time. She has not had a previous visit with a dietitian. She rarely participates in exercise. An ACE inhibitor/angiotensin II receptor blocker is being taken. She does not see a podiatrist.Eye exam is current. Hypertension  This is a chronic problem. The current episode started more than 1 year ago. The problem is controlled. Pertinent negatives include no chest pain, palpitations or shortness of breath. Risk factors for coronary artery disease include diabetes mellitus and dyslipidemia. Past treatments include ACE inhibitors, diuretics and calcium channel blockers. The current treatment provides significant improvement. There are no compliance problems. Hypertensive end-organ damage includes CAD/MI. Hyperlipidemia  This is a chronic problem. The current episode started more than 1 year ago. The problem is controlled. Exacerbating diseases include diabetes. Pertinent negatives include no chest pain or shortness of breath. Current antihyperlipidemic treatment includes statins. The current treatment provides significant improvement of lipids. There are no compliance problems. Risk factors for coronary artery disease include diabetes mellitus, dyslipidemia and hypertension. Review of Systems   Constitutional:  Negative for fatigue and fever. HENT:  Negative for nosebleeds and sore throat. Respiratory:  Negative for cough and shortness of breath.     Cardiovascular:  Negative for chest pain, palpitations and leg swelling. Gastrointestinal:  Negative for abdominal pain, blood in stool, nausea and vomiting. Neurological:  Negative for dizziness and weakness. Psychiatric/Behavioral:  Positive for dysphoric mood. Objective   Physical Exam  Constitutional:       Appearance: Normal appearance. HENT:      Head: Normocephalic and atraumatic. Eyes:      General: No scleral icterus. Conjunctiva/sclera: Conjunctivae normal.   Cardiovascular:      Rate and Rhythm: Normal rate and regular rhythm. Pulses: Normal pulses. Heart sounds: Normal heart sounds. Pulmonary:      Effort: Pulmonary effort is normal.      Breath sounds: Normal breath sounds. Musculoskeletal:         General: No swelling. Right lower leg: No edema. Left lower leg: No edema. Feet:      Comments: Visual inspection:  Deformity/amputation: absent  Skin lesions/pre-ulcerative calluses: absent  Edema: right- negative, left- negative    Sensory exam:  Monofilament sensation: normal  (minimum of 5 random plantar locations tested, avoiding callused areas - > 1 area with absence of sensation is + for neuropathy)    Plus at least one of the following:  Pulses: normal,   Proprioception: Intact  Vibration (128 Hz): Intact    Skin:     General: Skin is warm and dry. Neurological:      Mental Status: She is alert and oriented to person, place, and time. Mental status is at baseline. Psychiatric:         Mood and Affect: Mood is depressed. Behavior: Behavior normal.            An electronic signature was used to authenticate this note.     --Christopher Prieto MD Denis

## 2023-02-10 DIAGNOSIS — E11.8 TYPE 2 DIABETES MELLITUS WITH COMPLICATION, WITH LONG-TERM CURRENT USE OF INSULIN (HCC): ICD-10-CM

## 2023-02-10 DIAGNOSIS — Z79.4 TYPE 2 DIABETES MELLITUS WITH COMPLICATION, WITH LONG-TERM CURRENT USE OF INSULIN (HCC): ICD-10-CM

## 2023-02-10 RX ORDER — SYRING-NEEDL,DISP,INSUL,0.3 ML 31GX15/64"
SYRINGE, EMPTY DISPOSABLE MISCELLANEOUS
Qty: 100 EACH | Refills: 3 | Status: SHIPPED | OUTPATIENT
Start: 2023-02-10

## 2023-02-20 ENCOUNTER — TELEPHONE (OUTPATIENT)
Dept: INTERNAL MEDICINE CLINIC | Age: 68
End: 2023-02-20

## 2023-02-20 DIAGNOSIS — E11.8 TYPE 2 DIABETES MELLITUS WITH COMPLICATION, WITH LONG-TERM CURRENT USE OF INSULIN (HCC): ICD-10-CM

## 2023-02-20 DIAGNOSIS — Z79.4 TYPE 2 DIABETES MELLITUS WITH COMPLICATION, WITH LONG-TERM CURRENT USE OF INSULIN (HCC): ICD-10-CM

## 2023-02-22 ENCOUNTER — TELEPHONE (OUTPATIENT)
Dept: INTERNAL MEDICINE CLINIC | Age: 68
End: 2023-02-22

## 2023-02-22 DIAGNOSIS — E11.8 TYPE 2 DIABETES MELLITUS WITH COMPLICATION, WITH LONG-TERM CURRENT USE OF INSULIN (HCC): Primary | ICD-10-CM

## 2023-02-22 DIAGNOSIS — Z79.4 TYPE 2 DIABETES MELLITUS WITH COMPLICATION, WITH LONG-TERM CURRENT USE OF INSULIN (HCC): Primary | ICD-10-CM

## 2023-02-22 RX ORDER — BLOOD SUGAR DIAGNOSTIC
1 STRIP MISCELLANEOUS 3 TIMES DAILY
Qty: 100 EACH | Refills: 3 | Status: SHIPPED | OUTPATIENT
Start: 2023-02-22

## 2023-02-24 DIAGNOSIS — I10 ESSENTIAL HYPERTENSION: Chronic | ICD-10-CM

## 2023-02-24 RX ORDER — LISINOPRIL AND HYDROCHLOROTHIAZIDE 20; 12.5 MG/1; MG/1
TABLET ORAL
Qty: 90 TABLET | Refills: 0 | Status: SHIPPED | OUTPATIENT
Start: 2023-02-24

## 2023-03-02 ENCOUNTER — TELEPHONE (OUTPATIENT)
Dept: INTERNAL MEDICINE CLINIC | Age: 68
End: 2023-03-02

## 2023-03-03 NOTE — ADDENDUM NOTE
Addended by: Muriel Esparza on: 10/14/2020 04:11 PM     Modules accepted: Orders Recorded Pressure: LV, Ao, HR=78, Condition=Condition 1 (Left Ventricle) /-3/10, (Aorta) Ao 96/60/78

## 2023-03-05 DIAGNOSIS — E11.8 TYPE 2 DIABETES MELLITUS WITH COMPLICATION, WITH LONG-TERM CURRENT USE OF INSULIN (HCC): ICD-10-CM

## 2023-03-05 DIAGNOSIS — Z79.4 TYPE 2 DIABETES MELLITUS WITH COMPLICATION, WITH LONG-TERM CURRENT USE OF INSULIN (HCC): ICD-10-CM

## 2023-03-06 ENCOUNTER — TELEPHONE (OUTPATIENT)
Dept: INTERNAL MEDICINE CLINIC | Age: 68
End: 2023-03-06

## 2023-03-06 RX ORDER — INSULIN GLARGINE 100 [IU]/ML
50 INJECTION, SOLUTION SUBCUTANEOUS NIGHTLY
Qty: 2 ADJUSTABLE DOSE PRE-FILLED PEN SYRINGE | Refills: 0 | Status: SHIPPED | OUTPATIENT
Start: 2023-03-06

## 2023-04-22 DIAGNOSIS — I10 ESSENTIAL HYPERTENSION: ICD-10-CM

## 2023-04-24 RX ORDER — AMLODIPINE BESYLATE 5 MG/1
TABLET ORAL
Qty: 30 TABLET | Refills: 12 | Status: SHIPPED | OUTPATIENT
Start: 2023-04-24

## 2023-05-09 ENCOUNTER — OFFICE VISIT (OUTPATIENT)
Dept: INTERNAL MEDICINE CLINIC | Age: 68
End: 2023-05-09
Payer: MEDICARE

## 2023-05-09 VITALS
TEMPERATURE: 98 F | SYSTOLIC BLOOD PRESSURE: 117 MMHG | BODY MASS INDEX: 33.48 KG/M2 | WEIGHT: 189 LBS | DIASTOLIC BLOOD PRESSURE: 77 MMHG | OXYGEN SATURATION: 97 % | HEART RATE: 80 BPM

## 2023-05-09 DIAGNOSIS — I10 ESSENTIAL HYPERTENSION: ICD-10-CM

## 2023-05-09 DIAGNOSIS — E78.2 MIXED HYPERLIPIDEMIA: ICD-10-CM

## 2023-05-09 DIAGNOSIS — Z79.4 TYPE 2 DIABETES MELLITUS WITH COMPLICATION, WITH LONG-TERM CURRENT USE OF INSULIN (HCC): Primary | ICD-10-CM

## 2023-05-09 DIAGNOSIS — K21.9 GASTROESOPHAGEAL REFLUX DISEASE, UNSPECIFIED WHETHER ESOPHAGITIS PRESENT: ICD-10-CM

## 2023-05-09 DIAGNOSIS — I69.359 HEMIPARESIS AFFECTING DOMINANT SIDE AS LATE EFFECT OF CEREBROVASCULAR ACCIDENT (HCC): ICD-10-CM

## 2023-05-09 DIAGNOSIS — E11.8 TYPE 2 DIABETES MELLITUS WITH COMPLICATION, WITH LONG-TERM CURRENT USE OF INSULIN (HCC): Primary | ICD-10-CM

## 2023-05-09 DIAGNOSIS — F33.1 MODERATE EPISODE OF RECURRENT MAJOR DEPRESSIVE DISORDER (HCC): ICD-10-CM

## 2023-05-09 DIAGNOSIS — N39.41 URGE INCONTINENCE: ICD-10-CM

## 2023-05-09 LAB
ALBUMIN SERPL-MCNC: 4.3 G/DL (ref 3.4–5)
ALBUMIN/GLOB SERPL: 1.7 {RATIO} (ref 1.1–2.2)
ALP SERPL-CCNC: 49 U/L (ref 40–129)
ALT SERPL-CCNC: 18 U/L (ref 10–40)
ANION GAP SERPL CALCULATED.3IONS-SCNC: 14 MMOL/L (ref 3–16)
AST SERPL-CCNC: 14 U/L (ref 15–37)
BILIRUB SERPL-MCNC: 0.5 MG/DL (ref 0–1)
BUN SERPL-MCNC: 23 MG/DL (ref 7–20)
CALCIUM SERPL-MCNC: 9.3 MG/DL (ref 8.3–10.6)
CHLORIDE SERPL-SCNC: 103 MMOL/L (ref 99–110)
CO2 SERPL-SCNC: 23 MMOL/L (ref 21–32)
CREAT SERPL-MCNC: 1.1 MG/DL (ref 0.6–1.2)
GFR SERPLBLD CREATININE-BSD FMLA CKD-EPI: 55 ML/MIN/{1.73_M2}
GLUCOSE SERPL-MCNC: 167 MG/DL (ref 70–99)
POTASSIUM SERPL-SCNC: 4.2 MMOL/L (ref 3.5–5.1)
PROT SERPL-MCNC: 6.9 G/DL (ref 6.4–8.2)
SODIUM SERPL-SCNC: 140 MMOL/L (ref 136–145)
T4 FREE SERPL-MCNC: 1.1 NG/DL (ref 0.9–1.8)
TSH SERPL DL<=0.005 MIU/L-ACNC: 4.78 UIU/ML (ref 0.27–4.2)

## 2023-05-09 PROCEDURE — 1123F ACP DISCUSS/DSCN MKR DOCD: CPT | Performed by: INTERNAL MEDICINE

## 2023-05-09 PROCEDURE — 99214 OFFICE O/P EST MOD 30 MIN: CPT | Performed by: INTERNAL MEDICINE

## 2023-05-09 PROCEDURE — 3074F SYST BP LT 130 MM HG: CPT | Performed by: INTERNAL MEDICINE

## 2023-05-09 PROCEDURE — 3044F HG A1C LEVEL LT 7.0%: CPT | Performed by: INTERNAL MEDICINE

## 2023-05-09 PROCEDURE — 2022F DILAT RTA XM EVC RTNOPTHY: CPT | Performed by: INTERNAL MEDICINE

## 2023-05-09 PROCEDURE — 3017F COLORECTAL CA SCREEN DOC REV: CPT | Performed by: INTERNAL MEDICINE

## 2023-05-09 PROCEDURE — G8427 DOCREV CUR MEDS BY ELIG CLIN: HCPCS | Performed by: INTERNAL MEDICINE

## 2023-05-09 PROCEDURE — 1090F PRES/ABSN URINE INCON ASSESS: CPT | Performed by: INTERNAL MEDICINE

## 2023-05-09 PROCEDURE — 0509F URINE INCON PLAN DOCD: CPT | Performed by: INTERNAL MEDICINE

## 2023-05-09 PROCEDURE — 36415 COLL VENOUS BLD VENIPUNCTURE: CPT | Performed by: INTERNAL MEDICINE

## 2023-05-09 PROCEDURE — 1036F TOBACCO NON-USER: CPT | Performed by: INTERNAL MEDICINE

## 2023-05-09 PROCEDURE — 3078F DIAST BP <80 MM HG: CPT | Performed by: INTERNAL MEDICINE

## 2023-05-09 PROCEDURE — G8400 PT W/DXA NO RESULTS DOC: HCPCS | Performed by: INTERNAL MEDICINE

## 2023-05-09 PROCEDURE — G8417 CALC BMI ABV UP PARAM F/U: HCPCS | Performed by: INTERNAL MEDICINE

## 2023-05-09 SDOH — ECONOMIC STABILITY: INCOME INSECURITY: HOW HARD IS IT FOR YOU TO PAY FOR THE VERY BASICS LIKE FOOD, HOUSING, MEDICAL CARE, AND HEATING?: NOT HARD AT ALL

## 2023-05-09 SDOH — ECONOMIC STABILITY: FOOD INSECURITY: WITHIN THE PAST 12 MONTHS, YOU WORRIED THAT YOUR FOOD WOULD RUN OUT BEFORE YOU GOT MONEY TO BUY MORE.: NEVER TRUE

## 2023-05-09 SDOH — ECONOMIC STABILITY: HOUSING INSECURITY
IN THE LAST 12 MONTHS, WAS THERE A TIME WHEN YOU DID NOT HAVE A STEADY PLACE TO SLEEP OR SLEPT IN A SHELTER (INCLUDING NOW)?: NO

## 2023-05-09 SDOH — ECONOMIC STABILITY: FOOD INSECURITY: WITHIN THE PAST 12 MONTHS, THE FOOD YOU BOUGHT JUST DIDN'T LAST AND YOU DIDN'T HAVE MONEY TO GET MORE.: NEVER TRUE

## 2023-05-09 ASSESSMENT — ENCOUNTER SYMPTOMS
BLOOD IN STOOL: 0
COUGH: 0
SHORTNESS OF BREATH: 0
VOMITING: 0
ABDOMINAL PAIN: 0
SORE THROAT: 0
NAUSEA: 0

## 2023-05-09 NOTE — PROGRESS NOTES
Gabriela Jesus (:  1955) is a 78 y.o. female, Established patient, here for evaluation of the following chief complaint(s):  3 Month Follow-Up and Diabetes         ASSESSMENT/PLAN:  1. Type 2 diabetes mellitus with complication, with long-term current use of insulin (HCC)  - Stable   - Continue current dose of insulin glargine  -     Hemoglobin A1C  -     Microalbumin / Creatinine Urine Ratio    2. Urge incontinence  Chronic  - Stable   - Continue current dose of  oxybutynin. 3. Hemiparesis affecting dominant side as late effect of cerebrovascular accident ([facility] [address]  - Stable   - Continue current dose of atorvastatin     4. Moderate episode of recurrent major depressive disorder (HCC)  Chronic, uncontrolled. Patient stopped taking Lexapro since it is making her dizzy. Referring to psychiatry for further management. -     Ambulatory referral to Psychiatry    5. Mixed hyperlipidemia  - Stable   - Continue current dose of atorvastatin   -     Lipid Panel; Future    6. Essential hypertension  Stable   continue current dose of amlodipine, lisinopril-hydrochlorothiazide. -     Comprehensive Metabolic Panel  -     TSH with Reflex    7. Gastroesophageal reflux disease, unspecified whether esophagitis present:  - Stable   - Continue current dose of Nexium    Return in about 3 months (around 2023). Subjective   SUBJECTIVE/OBJECTIVE:  GERD:  Stable on PPI. Urge incontinence:  Patient takes oxybutynin at night. Depression:  Worsening, patient stopped taking Lexapro since it was making her feel dizzy    Diabetes  She presents for her follow-up diabetic visit. She has type 2 diabetes mellitus. There are no hypoglycemic associated symptoms. Pertinent negatives for hypoglycemia include no dizziness. There are no diabetic associated symptoms. Pertinent negatives for diabetes include no chest pain, no fatigue and no weakness. There are no hypoglycemic complications.  Diabetic complications include

## 2023-05-10 LAB
EST. AVERAGE GLUCOSE BLD GHB EST-MCNC: 137 MG/DL
HBA1C MFR BLD: 6.4 %

## 2023-05-26 DIAGNOSIS — Z79.4 TYPE 2 DIABETES MELLITUS WITH COMPLICATION, WITH LONG-TERM CURRENT USE OF INSULIN (HCC): ICD-10-CM

## 2023-05-26 DIAGNOSIS — E11.8 TYPE 2 DIABETES MELLITUS WITH COMPLICATION, WITH LONG-TERM CURRENT USE OF INSULIN (HCC): ICD-10-CM

## 2023-05-28 RX ORDER — INSULIN GLARGINE 100 [IU]/ML
50 INJECTION, SOLUTION SUBCUTANEOUS NIGHTLY
Qty: 2 ADJUSTABLE DOSE PRE-FILLED PEN SYRINGE | Refills: 0 | Status: SHIPPED | OUTPATIENT
Start: 2023-05-28

## 2023-05-30 DIAGNOSIS — I10 ESSENTIAL HYPERTENSION: Chronic | ICD-10-CM

## 2023-05-30 RX ORDER — LISINOPRIL AND HYDROCHLOROTHIAZIDE 20; 12.5 MG/1; MG/1
1 TABLET ORAL 2 TIMES DAILY
Qty: 90 TABLET | Refills: 0 | Status: ON HOLD | OUTPATIENT
Start: 2023-05-30

## 2023-05-30 NOTE — TELEPHONE ENCOUNTER
Pt ionly has one dose of Lisinopril -Hctz 10-12.5 mg.  Robert Khanh    Her sister sprained  her hip trying to get patient up the driveway. She won't be able to bring pt in for labs for a couple of weeks.

## 2023-06-04 ENCOUNTER — APPOINTMENT (OUTPATIENT)
Dept: GENERAL RADIOLOGY | Age: 68
DRG: 854 | End: 2023-06-04
Payer: MEDICARE

## 2023-06-04 ENCOUNTER — HOSPITAL ENCOUNTER (INPATIENT)
Age: 68
LOS: 3 days | Discharge: HOME | DRG: 854 | End: 2023-06-07
Attending: EMERGENCY MEDICINE | Admitting: INTERNAL MEDICINE
Payer: MEDICARE

## 2023-06-04 DIAGNOSIS — E11.8 TYPE 2 DIABETES MELLITUS WITH COMPLICATION, WITH LONG-TERM CURRENT USE OF INSULIN (HCC): ICD-10-CM

## 2023-06-04 DIAGNOSIS — S60.411A ABRASION OF LEFT INDEX FINGER WITH INFECTION: ICD-10-CM

## 2023-06-04 DIAGNOSIS — L08.9 WOUND INFECTION: Primary | ICD-10-CM

## 2023-06-04 DIAGNOSIS — T14.8XXA WOUND INFECTION: Primary | ICD-10-CM

## 2023-06-04 DIAGNOSIS — L08.9 ABRASION OF LEFT INDEX FINGER WITH INFECTION: ICD-10-CM

## 2023-06-04 DIAGNOSIS — Z79.4 TYPE 2 DIABETES MELLITUS WITH COMPLICATION, WITH LONG-TERM CURRENT USE OF INSULIN (HCC): ICD-10-CM

## 2023-06-04 PROBLEM — S60.512A: Status: ACTIVE | Noted: 2023-06-04

## 2023-06-04 PROBLEM — S60.459A: Status: ACTIVE | Noted: 2023-06-04

## 2023-06-04 LAB
ANION GAP SERPL CALCULATED.3IONS-SCNC: 13 MMOL/L (ref 3–16)
BASOPHILS # BLD: 0.1 K/UL (ref 0–0.2)
BASOPHILS NFR BLD: 0.5 %
BUN SERPL-MCNC: 38 MG/DL (ref 7–20)
CALCIUM SERPL-MCNC: 9.2 MG/DL (ref 8.3–10.6)
CHLORIDE SERPL-SCNC: 99 MMOL/L (ref 99–110)
CO2 SERPL-SCNC: 24 MMOL/L (ref 21–32)
CREAT SERPL-MCNC: 1.2 MG/DL (ref 0.6–1.2)
DEPRECATED RDW RBC AUTO: 12.7 % (ref 12.4–15.4)
EOSINOPHIL # BLD: 0.1 K/UL (ref 0–0.6)
EOSINOPHIL NFR BLD: 0.7 %
ERYTHROCYTE [SEDIMENTATION RATE] IN BLOOD BY WESTERGREN METHOD: 63 MM/HR (ref 0–30)
GFR SERPLBLD CREATININE-BSD FMLA CKD-EPI: 50 ML/MIN/{1.73_M2}
GLUCOSE BLD-MCNC: 231 MG/DL (ref 70–99)
GLUCOSE SERPL-MCNC: 144 MG/DL (ref 70–99)
HCT VFR BLD AUTO: 34.1 % (ref 36–48)
HGB BLD-MCNC: 11.7 G/DL (ref 12–16)
LYMPHOCYTES # BLD: 1.7 K/UL (ref 1–5.1)
LYMPHOCYTES NFR BLD: 11.3 %
MCH RBC QN AUTO: 29.4 PG (ref 26–34)
MCHC RBC AUTO-ENTMCNC: 34.3 G/DL (ref 31–36)
MCV RBC AUTO: 85.6 FL (ref 80–100)
MONOCYTES # BLD: 0.7 K/UL (ref 0–1.3)
MONOCYTES NFR BLD: 4.4 %
NEUTROPHILS # BLD: 12.3 K/UL (ref 1.7–7.7)
NEUTROPHILS NFR BLD: 83.1 %
PERFORMED ON: ABNORMAL
PLATELET # BLD AUTO: 313 K/UL (ref 135–450)
PMV BLD AUTO: 8.6 FL (ref 5–10.5)
POTASSIUM SERPL-SCNC: 3.7 MMOL/L (ref 3.5–5.1)
RBC # BLD AUTO: 3.99 M/UL (ref 4–5.2)
SODIUM SERPL-SCNC: 136 MMOL/L (ref 136–145)
TROPONIN, HIGH SENSITIVITY: 18 NG/L (ref 0–14)
WBC # BLD AUTO: 14.9 K/UL (ref 4–11)

## 2023-06-04 PROCEDURE — 84484 ASSAY OF TROPONIN QUANT: CPT

## 2023-06-04 PROCEDURE — 2580000003 HC RX 258: Performed by: STUDENT IN AN ORGANIZED HEALTH CARE EDUCATION/TRAINING PROGRAM

## 2023-06-04 PROCEDURE — 80048 BASIC METABOLIC PNL TOTAL CA: CPT

## 2023-06-04 PROCEDURE — 73130 X-RAY EXAM OF HAND: CPT

## 2023-06-04 PROCEDURE — 36415 COLL VENOUS BLD VENIPUNCTURE: CPT

## 2023-06-04 PROCEDURE — 6370000000 HC RX 637 (ALT 250 FOR IP): Performed by: EMERGENCY MEDICINE

## 2023-06-04 PROCEDURE — 85652 RBC SED RATE AUTOMATED: CPT

## 2023-06-04 PROCEDURE — 96367 TX/PROPH/DG ADDL SEQ IV INF: CPT

## 2023-06-04 PROCEDURE — 99285 EMERGENCY DEPT VISIT HI MDM: CPT

## 2023-06-04 PROCEDURE — 96375 TX/PRO/DX INJ NEW DRUG ADDON: CPT

## 2023-06-04 PROCEDURE — 87040 BLOOD CULTURE FOR BACTERIA: CPT

## 2023-06-04 PROCEDURE — 2580000003 HC RX 258: Performed by: EMERGENCY MEDICINE

## 2023-06-04 PROCEDURE — 2580000003 HC RX 258: Performed by: INTERNAL MEDICINE

## 2023-06-04 PROCEDURE — 6360000002 HC RX W HCPCS: Performed by: EMERGENCY MEDICINE

## 2023-06-04 PROCEDURE — 6370000000 HC RX 637 (ALT 250 FOR IP): Performed by: INTERNAL MEDICINE

## 2023-06-04 PROCEDURE — 85025 COMPLETE CBC W/AUTO DIFF WBC: CPT

## 2023-06-04 PROCEDURE — 1200000000 HC SEMI PRIVATE

## 2023-06-04 PROCEDURE — 96365 THER/PROPH/DIAG IV INF INIT: CPT

## 2023-06-04 PROCEDURE — 96366 THER/PROPH/DIAG IV INF ADDON: CPT

## 2023-06-04 RX ORDER — M-VIT,TX,IRON,MINS/CALC/FOLIC 27MG-0.4MG
1 TABLET ORAL DAILY
Status: DISCONTINUED | OUTPATIENT
Start: 2023-06-05 | End: 2023-06-07 | Stop reason: HOSPADM

## 2023-06-04 RX ORDER — POTASSIUM CHLORIDE 1.5 G/1.77G
20 POWDER, FOR SOLUTION ORAL 2 TIMES DAILY
Status: DISCONTINUED | OUTPATIENT
Start: 2023-06-04 | End: 2023-06-04

## 2023-06-04 RX ORDER — LISINOPRIL 20 MG/1
20 TABLET ORAL 2 TIMES DAILY
Status: DISCONTINUED | OUTPATIENT
Start: 2023-06-04 | End: 2023-06-07 | Stop reason: HOSPADM

## 2023-06-04 RX ORDER — ACETAMINOPHEN 650 MG/1
650 SUPPOSITORY RECTAL EVERY 6 HOURS PRN
Status: DISCONTINUED | OUTPATIENT
Start: 2023-06-04 | End: 2023-06-07 | Stop reason: HOSPADM

## 2023-06-04 RX ORDER — ONDANSETRON 2 MG/ML
4 INJECTION INTRAMUSCULAR; INTRAVENOUS EVERY 6 HOURS PRN
Status: DISCONTINUED | OUTPATIENT
Start: 2023-06-04 | End: 2023-06-04 | Stop reason: SDUPTHER

## 2023-06-04 RX ORDER — HYDROCHLOROTHIAZIDE 25 MG/1
12.5 TABLET ORAL 2 TIMES DAILY
Status: DISCONTINUED | OUTPATIENT
Start: 2023-06-04 | End: 2023-06-04

## 2023-06-04 RX ORDER — SODIUM CHLORIDE 0.9 % (FLUSH) 0.9 %
5-40 SYRINGE (ML) INJECTION EVERY 12 HOURS SCHEDULED
Status: DISCONTINUED | OUTPATIENT
Start: 2023-06-04 | End: 2023-06-07 | Stop reason: HOSPADM

## 2023-06-04 RX ORDER — DEXTROSE MONOHYDRATE 100 MG/ML
INJECTION, SOLUTION INTRAVENOUS CONTINUOUS PRN
Status: DISCONTINUED | OUTPATIENT
Start: 2023-06-04 | End: 2023-06-07 | Stop reason: HOSPADM

## 2023-06-04 RX ORDER — ONDANSETRON 4 MG/1
4 TABLET, ORALLY DISINTEGRATING ORAL EVERY 8 HOURS PRN
Status: DISCONTINUED | OUTPATIENT
Start: 2023-06-04 | End: 2023-06-04

## 2023-06-04 RX ORDER — ONDANSETRON 4 MG/1
4 TABLET, ORALLY DISINTEGRATING ORAL EVERY 8 HOURS PRN
Status: DISCONTINUED | OUTPATIENT
Start: 2023-06-04 | End: 2023-06-04 | Stop reason: SDUPTHER

## 2023-06-04 RX ORDER — PANTOPRAZOLE SODIUM 40 MG/1
40 TABLET, DELAYED RELEASE ORAL
Status: DISCONTINUED | OUTPATIENT
Start: 2023-06-05 | End: 2023-06-07 | Stop reason: HOSPADM

## 2023-06-04 RX ORDER — ACETAMINOPHEN 500 MG
1000 TABLET ORAL ONCE
Status: COMPLETED | OUTPATIENT
Start: 2023-06-04 | End: 2023-06-04

## 2023-06-04 RX ORDER — SODIUM CHLORIDE 0.9 % (FLUSH) 0.9 %
5-40 SYRINGE (ML) INJECTION PRN
Status: DISCONTINUED | OUTPATIENT
Start: 2023-06-04 | End: 2023-06-07 | Stop reason: HOSPADM

## 2023-06-04 RX ORDER — KETOROLAC TROMETHAMINE 15 MG/ML
10 INJECTION, SOLUTION INTRAMUSCULAR; INTRAVENOUS ONCE
Status: COMPLETED | OUTPATIENT
Start: 2023-06-04 | End: 2023-06-04

## 2023-06-04 RX ORDER — ONDANSETRON 2 MG/ML
4 INJECTION INTRAMUSCULAR; INTRAVENOUS EVERY 6 HOURS PRN
Status: DISCONTINUED | OUTPATIENT
Start: 2023-06-04 | End: 2023-06-07 | Stop reason: HOSPADM

## 2023-06-04 RX ORDER — POLYETHYLENE GLYCOL 3350 17 G/17G
17 POWDER, FOR SOLUTION ORAL DAILY PRN
Status: DISCONTINUED | OUTPATIENT
Start: 2023-06-04 | End: 2023-06-07 | Stop reason: HOSPADM

## 2023-06-04 RX ORDER — 0.9 % SODIUM CHLORIDE 0.9 %
1000 INTRAVENOUS SOLUTION INTRAVENOUS ONCE
Status: COMPLETED | OUTPATIENT
Start: 2023-06-04 | End: 2023-06-04

## 2023-06-04 RX ORDER — FUROSEMIDE 40 MG/1
40 TABLET ORAL DAILY
Status: DISCONTINUED | OUTPATIENT
Start: 2023-06-05 | End: 2023-06-05

## 2023-06-04 RX ORDER — ACETAMINOPHEN 325 MG/1
650 TABLET ORAL EVERY 6 HOURS PRN
Status: DISCONTINUED | OUTPATIENT
Start: 2023-06-04 | End: 2023-06-07 | Stop reason: HOSPADM

## 2023-06-04 RX ORDER — AMLODIPINE BESYLATE 5 MG/1
5 TABLET ORAL DAILY
Status: DISCONTINUED | OUTPATIENT
Start: 2023-06-05 | End: 2023-06-07 | Stop reason: HOSPADM

## 2023-06-04 RX ORDER — BETAMETHASONE DIPROPIONATE 0.05 %
OINTMENT (GRAM) TOPICAL DAILY
Status: DISCONTINUED | OUTPATIENT
Start: 2023-06-05 | End: 2023-06-05 | Stop reason: ALTCHOICE

## 2023-06-04 RX ORDER — POTASSIUM CHLORIDE 20 MEQ/1
10 TABLET, EXTENDED RELEASE ORAL
Status: DISCONTINUED | OUTPATIENT
Start: 2023-06-05 | End: 2023-06-04

## 2023-06-04 RX ORDER — SODIUM CHLORIDE 9 MG/ML
INJECTION, SOLUTION INTRAVENOUS CONTINUOUS
Status: DISCONTINUED | OUTPATIENT
Start: 2023-06-04 | End: 2023-06-04

## 2023-06-04 RX ORDER — VANCOMYCIN 1.75 G/350ML
15 INJECTION, SOLUTION INTRAVENOUS EVERY 12 HOURS
Status: DISCONTINUED | OUTPATIENT
Start: 2023-06-04 | End: 2023-06-04

## 2023-06-04 RX ORDER — INSULIN GLARGINE 100 [IU]/ML
50 INJECTION, SOLUTION SUBCUTANEOUS NIGHTLY
Status: DISCONTINUED | OUTPATIENT
Start: 2023-06-04 | End: 2023-06-06

## 2023-06-04 RX ORDER — ENOXAPARIN SODIUM 100 MG/ML
40 INJECTION SUBCUTANEOUS DAILY
Status: DISCONTINUED | OUTPATIENT
Start: 2023-06-05 | End: 2023-06-04 | Stop reason: SDUPTHER

## 2023-06-04 RX ORDER — OXYBUTYNIN CHLORIDE 10 MG/1
10 TABLET, EXTENDED RELEASE ORAL NIGHTLY
Status: DISCONTINUED | OUTPATIENT
Start: 2023-06-04 | End: 2023-06-07 | Stop reason: HOSPADM

## 2023-06-04 RX ORDER — SODIUM CHLORIDE 9 MG/ML
INJECTION, SOLUTION INTRAVENOUS CONTINUOUS
Status: ACTIVE | OUTPATIENT
Start: 2023-06-04 | End: 2023-06-05

## 2023-06-04 RX ORDER — LISINOPRIL AND HYDROCHLOROTHIAZIDE 20; 12.5 MG/1; MG/1
1 TABLET ORAL 2 TIMES DAILY
Status: DISCONTINUED | OUTPATIENT
Start: 2023-06-04 | End: 2023-06-04

## 2023-06-04 RX ORDER — ASPIRIN 81 MG/1
81 TABLET, CHEWABLE ORAL DAILY
Status: DISCONTINUED | OUTPATIENT
Start: 2023-06-05 | End: 2023-06-07 | Stop reason: HOSPADM

## 2023-06-04 RX ORDER — ATORVASTATIN CALCIUM 20 MG/1
20 TABLET, FILM COATED ORAL DAILY
Status: DISCONTINUED | OUTPATIENT
Start: 2023-06-05 | End: 2023-06-07 | Stop reason: HOSPADM

## 2023-06-04 RX ORDER — SODIUM CHLORIDE 9 MG/ML
INJECTION, SOLUTION INTRAVENOUS PRN
Status: DISCONTINUED | OUTPATIENT
Start: 2023-06-04 | End: 2023-06-07 | Stop reason: HOSPADM

## 2023-06-04 RX ORDER — CALCIUM CARBONATE 500(1250)
500 TABLET ORAL DAILY
Status: DISCONTINUED | OUTPATIENT
Start: 2023-06-05 | End: 2023-06-07 | Stop reason: HOSPADM

## 2023-06-04 RX ORDER — ENOXAPARIN SODIUM 100 MG/ML
40 INJECTION SUBCUTANEOUS DAILY
Status: DISCONTINUED | OUTPATIENT
Start: 2023-06-05 | End: 2023-06-07 | Stop reason: HOSPADM

## 2023-06-04 RX ADMIN — SODIUM CHLORIDE 1000 ML: 9 INJECTION, SOLUTION INTRAVENOUS at 14:22

## 2023-06-04 RX ADMIN — ACETAMINOPHEN 1000 MG: 500 TABLET ORAL at 14:22

## 2023-06-04 RX ADMIN — LISINOPRIL 20 MG: 20 TABLET ORAL at 23:23

## 2023-06-04 RX ADMIN — VANCOMYCIN HYDROCHLORIDE 1750 MG: 1 INJECTION, POWDER, LYOPHILIZED, FOR SOLUTION INTRAVENOUS at 15:16

## 2023-06-04 RX ADMIN — CEFTRIAXONE SODIUM 2000 MG: 1 INJECTION, POWDER, FOR SOLUTION INTRAMUSCULAR; INTRAVENOUS at 14:21

## 2023-06-04 RX ADMIN — SODIUM CHLORIDE: 9 INJECTION, SOLUTION INTRAVENOUS at 23:23

## 2023-06-04 RX ADMIN — INSULIN GLARGINE 50 UNITS: 100 INJECTION, SOLUTION SUBCUTANEOUS at 23:24

## 2023-06-04 RX ADMIN — SODIUM CHLORIDE, PRESERVATIVE FREE 10 ML: 5 INJECTION INTRAVENOUS at 23:22

## 2023-06-04 RX ADMIN — OXYBUTYNIN CHLORIDE 10 MG: 10 TABLET, EXTENDED RELEASE ORAL at 23:23

## 2023-06-04 RX ADMIN — KETOROLAC TROMETHAMINE 10 MG: 15 INJECTION, SOLUTION INTRAMUSCULAR; INTRAVENOUS at 14:22

## 2023-06-04 ASSESSMENT — PAIN SCALES - GENERAL: PAINLEVEL_OUTOF10: 10

## 2023-06-04 ASSESSMENT — ENCOUNTER SYMPTOMS: COLOR CHANGE: 1

## 2023-06-05 ENCOUNTER — ANESTHESIA EVENT (OUTPATIENT)
Dept: OPERATING ROOM | Age: 68
DRG: 854 | End: 2023-06-05
Payer: MEDICARE

## 2023-06-05 ENCOUNTER — ANESTHESIA (OUTPATIENT)
Dept: OPERATING ROOM | Age: 68
DRG: 854 | End: 2023-06-05
Payer: MEDICARE

## 2023-06-05 PROBLEM — I10 HTN (HYPERTENSION): Status: ACTIVE | Noted: 2023-06-05

## 2023-06-05 PROBLEM — L08.9: Status: RESOLVED | Noted: 2023-06-04 | Resolved: 2023-06-05

## 2023-06-05 PROBLEM — E11.9 DM2 (DIABETES MELLITUS, TYPE 2) (HCC): Status: ACTIVE | Noted: 2023-06-05

## 2023-06-05 PROBLEM — S60.512A: Status: RESOLVED | Noted: 2023-06-04 | Resolved: 2023-06-05

## 2023-06-05 PROBLEM — L08.9 WOUND INFECTION: Status: ACTIVE | Noted: 2023-06-05

## 2023-06-05 PROBLEM — T14.8XXA WOUND INFECTION: Status: ACTIVE | Noted: 2023-06-05

## 2023-06-05 PROBLEM — S60.411A ABRASION OF LEFT INDEX FINGER WITH INFECTION: Status: ACTIVE | Noted: 2023-06-04

## 2023-06-05 LAB
ALBUMIN SERPL-MCNC: 3.4 G/DL (ref 3.4–5)
ANION GAP SERPL CALCULATED.3IONS-SCNC: 14 MMOL/L (ref 3–16)
BUN SERPL-MCNC: 19 MG/DL (ref 7–20)
CALCIUM SERPL-MCNC: 8.4 MG/DL (ref 8.3–10.6)
CHLORIDE SERPL-SCNC: 106 MMOL/L (ref 99–110)
CO2 SERPL-SCNC: 21 MMOL/L (ref 21–32)
CREAT SERPL-MCNC: 0.8 MG/DL (ref 0.6–1.2)
DEPRECATED RDW RBC AUTO: 12.5 % (ref 12.4–15.4)
GFR SERPLBLD CREATININE-BSD FMLA CKD-EPI: >60 ML/MIN/{1.73_M2}
GLUCOSE BLD-MCNC: 100 MG/DL (ref 70–99)
GLUCOSE BLD-MCNC: 115 MG/DL (ref 70–99)
GLUCOSE BLD-MCNC: 66 MG/DL (ref 70–99)
GLUCOSE BLD-MCNC: 71 MG/DL (ref 70–99)
GLUCOSE BLD-MCNC: 91 MG/DL (ref 70–99)
GLUCOSE SERPL-MCNC: 80 MG/DL (ref 70–99)
HCT VFR BLD AUTO: 32.9 % (ref 36–48)
HGB BLD-MCNC: 11.2 G/DL (ref 12–16)
MAGNESIUM SERPL-MCNC: 1.7 MG/DL (ref 1.8–2.4)
MCH RBC QN AUTO: 28.9 PG (ref 26–34)
MCHC RBC AUTO-ENTMCNC: 34.1 G/DL (ref 31–36)
MCV RBC AUTO: 84.7 FL (ref 80–100)
PERFORMED ON: ABNORMAL
PERFORMED ON: NORMAL
PERFORMED ON: NORMAL
PHOSPHATE SERPL-MCNC: 2.8 MG/DL (ref 2.5–4.9)
PLATELET # BLD AUTO: 306 K/UL (ref 135–450)
PMV BLD AUTO: 9.4 FL (ref 5–10.5)
POTASSIUM SERPL-SCNC: 3.5 MMOL/L (ref 3.5–5.1)
RBC # BLD AUTO: 3.88 M/UL (ref 4–5.2)
SODIUM SERPL-SCNC: 141 MMOL/L (ref 136–145)
TROPONIN, HIGH SENSITIVITY: 18 NG/L (ref 0–14)
WBC # BLD AUTO: 10.1 K/UL (ref 4–11)

## 2023-06-05 PROCEDURE — 36415 COLL VENOUS BLD VENIPUNCTURE: CPT

## 2023-06-05 PROCEDURE — 85027 COMPLETE CBC AUTOMATED: CPT

## 2023-06-05 PROCEDURE — 80069 RENAL FUNCTION PANEL: CPT

## 2023-06-05 PROCEDURE — 6360000002 HC RX W HCPCS: Performed by: NURSE ANESTHETIST, CERTIFIED REGISTERED

## 2023-06-05 PROCEDURE — 2500000003 HC RX 250 WO HCPCS: Performed by: NURSE ANESTHETIST, CERTIFIED REGISTERED

## 2023-06-05 PROCEDURE — 6360000002 HC RX W HCPCS: Performed by: ANESTHESIOLOGY

## 2023-06-05 PROCEDURE — 2709999900 HC NON-CHARGEABLE SUPPLY: Performed by: ORTHOPAEDIC SURGERY

## 2023-06-05 PROCEDURE — 6370000000 HC RX 637 (ALT 250 FOR IP): Performed by: INTERNAL MEDICINE

## 2023-06-05 PROCEDURE — 6370000000 HC RX 637 (ALT 250 FOR IP): Performed by: STUDENT IN AN ORGANIZED HEALTH CARE EDUCATION/TRAINING PROGRAM

## 2023-06-05 PROCEDURE — 83735 ASSAY OF MAGNESIUM: CPT

## 2023-06-05 PROCEDURE — 3600000002 HC SURGERY LEVEL 2 BASE: Performed by: ORTHOPAEDIC SURGERY

## 2023-06-05 PROCEDURE — 2580000003 HC RX 258: Performed by: INTERNAL MEDICINE

## 2023-06-05 PROCEDURE — 87205 SMEAR GRAM STAIN: CPT

## 2023-06-05 PROCEDURE — 3600000012 HC SURGERY LEVEL 2 ADDTL 15MIN: Performed by: ORTHOPAEDIC SURGERY

## 2023-06-05 PROCEDURE — 2580000003 HC RX 258: Performed by: ORTHOPAEDIC SURGERY

## 2023-06-05 PROCEDURE — 3700000001 HC ADD 15 MINUTES (ANESTHESIA): Performed by: ORTHOPAEDIC SURGERY

## 2023-06-05 PROCEDURE — 7100000001 HC PACU RECOVERY - ADDTL 15 MIN: Performed by: ORTHOPAEDIC SURGERY

## 2023-06-05 PROCEDURE — 3700000000 HC ANESTHESIA ATTENDED CARE: Performed by: ORTHOPAEDIC SURGERY

## 2023-06-05 PROCEDURE — 2580000003 HC RX 258: Performed by: NURSE ANESTHETIST, CERTIFIED REGISTERED

## 2023-06-05 PROCEDURE — 2500000003 HC RX 250 WO HCPCS: Performed by: ANESTHESIOLOGY

## 2023-06-05 PROCEDURE — 7100000000 HC PACU RECOVERY - FIRST 15 MIN: Performed by: ORTHOPAEDIC SURGERY

## 2023-06-05 PROCEDURE — 1200000000 HC SEMI PRIVATE

## 2023-06-05 PROCEDURE — 84484 ASSAY OF TROPONIN QUANT: CPT

## 2023-06-05 PROCEDURE — A4217 STERILE WATER/SALINE, 500 ML: HCPCS | Performed by: ORTHOPAEDIC SURGERY

## 2023-06-05 PROCEDURE — 6360000002 HC RX W HCPCS: Performed by: INTERNAL MEDICINE

## 2023-06-05 PROCEDURE — 94760 N-INVAS EAR/PLS OXIMETRY 1: CPT

## 2023-06-05 PROCEDURE — 0R9V0ZZ DRAINAGE OF LEFT METACARPOPHALANGEAL JOINT, OPEN APPROACH: ICD-10-PCS | Performed by: ORTHOPAEDIC SURGERY

## 2023-06-05 PROCEDURE — 87070 CULTURE OTHR SPECIMN AEROBIC: CPT

## 2023-06-05 RX ORDER — SODIUM CHLORIDE 9 MG/ML
INJECTION, SOLUTION INTRAVENOUS PRN
Status: DISCONTINUED | OUTPATIENT
Start: 2023-06-05 | End: 2023-06-05 | Stop reason: HOSPADM

## 2023-06-05 RX ORDER — SODIUM CHLORIDE 9 MG/ML
INJECTION, SOLUTION INTRAVENOUS CONTINUOUS PRN
Status: DISCONTINUED | OUTPATIENT
Start: 2023-06-05 | End: 2023-06-05 | Stop reason: SDUPTHER

## 2023-06-05 RX ORDER — PROPOFOL 10 MG/ML
INJECTION, EMULSION INTRAVENOUS PRN
Status: DISCONTINUED | OUTPATIENT
Start: 2023-06-05 | End: 2023-06-05 | Stop reason: SDUPTHER

## 2023-06-05 RX ORDER — MAGNESIUM HYDROXIDE 1200 MG/15ML
LIQUID ORAL CONTINUOUS PRN
Status: COMPLETED | OUTPATIENT
Start: 2023-06-05 | End: 2023-06-05

## 2023-06-05 RX ORDER — OXYCODONE HYDROCHLORIDE 5 MG/1
5 TABLET ORAL
Status: DISCONTINUED | OUTPATIENT
Start: 2023-06-05 | End: 2023-06-05 | Stop reason: HOSPADM

## 2023-06-05 RX ORDER — LIDOCAINE HYDROCHLORIDE 20 MG/ML
INJECTION, SOLUTION EPIDURAL; INFILTRATION; INTRACAUDAL; PERINEURAL PRN
Status: DISCONTINUED | OUTPATIENT
Start: 2023-06-05 | End: 2023-06-05 | Stop reason: SDUPTHER

## 2023-06-05 RX ORDER — MEPERIDINE HYDROCHLORIDE 25 MG/ML
12.5 INJECTION INTRAMUSCULAR; INTRAVENOUS; SUBCUTANEOUS EVERY 5 MIN PRN
Status: DISCONTINUED | OUTPATIENT
Start: 2023-06-05 | End: 2023-06-05 | Stop reason: HOSPADM

## 2023-06-05 RX ORDER — ONDANSETRON 2 MG/ML
4 INJECTION INTRAMUSCULAR; INTRAVENOUS
Status: DISCONTINUED | OUTPATIENT
Start: 2023-06-05 | End: 2023-06-05 | Stop reason: HOSPADM

## 2023-06-05 RX ORDER — DROPERIDOL 2.5 MG/ML
0.62 INJECTION, SOLUTION INTRAMUSCULAR; INTRAVENOUS
Status: DISCONTINUED | OUTPATIENT
Start: 2023-06-05 | End: 2023-06-05 | Stop reason: HOSPADM

## 2023-06-05 RX ORDER — FENTANYL CITRATE 50 UG/ML
25 INJECTION, SOLUTION INTRAMUSCULAR; INTRAVENOUS EVERY 5 MIN PRN
Status: DISCONTINUED | OUTPATIENT
Start: 2023-06-05 | End: 2023-06-05 | Stop reason: HOSPADM

## 2023-06-05 RX ORDER — ROPIVACAINE HYDROCHLORIDE 5 MG/ML
INJECTION, SOLUTION EPIDURAL; INFILTRATION; PERINEURAL
Status: DISCONTINUED | OUTPATIENT
Start: 2023-06-05 | End: 2023-06-05 | Stop reason: SDUPTHER

## 2023-06-05 RX ORDER — DEXTROSE MONOHYDRATE 25 G/50ML
12.5 INJECTION, SOLUTION INTRAVENOUS ONCE
Status: COMPLETED | OUTPATIENT
Start: 2023-06-05 | End: 2023-06-05

## 2023-06-05 RX ORDER — POTASSIUM CHLORIDE 20 MEQ/1
40 TABLET, EXTENDED RELEASE ORAL PRN
Status: DISCONTINUED | OUTPATIENT
Start: 2023-06-05 | End: 2023-06-07 | Stop reason: HOSPADM

## 2023-06-05 RX ORDER — SODIUM CHLORIDE 0.9 % (FLUSH) 0.9 %
5-40 SYRINGE (ML) INJECTION PRN
Status: DISCONTINUED | OUTPATIENT
Start: 2023-06-05 | End: 2023-06-05 | Stop reason: HOSPADM

## 2023-06-05 RX ORDER — SODIUM CHLORIDE 0.9 % (FLUSH) 0.9 %
5-40 SYRINGE (ML) INJECTION EVERY 12 HOURS SCHEDULED
Status: DISCONTINUED | OUTPATIENT
Start: 2023-06-05 | End: 2023-06-05 | Stop reason: HOSPADM

## 2023-06-05 RX ORDER — MAGNESIUM SULFATE IN WATER 40 MG/ML
2000 INJECTION, SOLUTION INTRAVENOUS PRN
Status: DISCONTINUED | OUTPATIENT
Start: 2023-06-05 | End: 2023-06-07 | Stop reason: HOSPADM

## 2023-06-05 RX ORDER — POTASSIUM CHLORIDE 7.45 MG/ML
10 INJECTION INTRAVENOUS PRN
Status: DISCONTINUED | OUTPATIENT
Start: 2023-06-05 | End: 2023-06-07 | Stop reason: HOSPADM

## 2023-06-05 RX ADMIN — LISINOPRIL 20 MG: 20 TABLET ORAL at 20:44

## 2023-06-05 RX ADMIN — PROPOFOL 50 MG: 10 INJECTION, EMULSION INTRAVENOUS at 12:28

## 2023-06-05 RX ADMIN — ROPIVACAINE HYDROCHLORIDE 30 ML: 5 INJECTION, SOLUTION EPIDURAL; INFILTRATION; PERINEURAL at 12:00

## 2023-06-05 RX ADMIN — SODIUM CHLORIDE, PRESERVATIVE FREE 10 ML: 5 INJECTION INTRAVENOUS at 20:43

## 2023-06-05 RX ADMIN — AMLODIPINE BESYLATE 5 MG: 5 TABLET ORAL at 09:27

## 2023-06-05 RX ADMIN — VANCOMYCIN HYDROCHLORIDE 750 MG: 750 INJECTION, POWDER, LYOPHILIZED, FOR SOLUTION INTRAVENOUS at 17:30

## 2023-06-05 RX ADMIN — PANTOPRAZOLE SODIUM 40 MG: 40 TABLET, DELAYED RELEASE ORAL at 05:17

## 2023-06-05 RX ADMIN — CEFTRIAXONE SODIUM 2000 MG: 2 INJECTION, POWDER, FOR SOLUTION INTRAMUSCULAR; INTRAVENOUS at 14:27

## 2023-06-05 RX ADMIN — SODIUM CHLORIDE: 9 INJECTION, SOLUTION INTRAVENOUS at 12:16

## 2023-06-05 RX ADMIN — OXYBUTYNIN CHLORIDE 10 MG: 10 TABLET, EXTENDED RELEASE ORAL at 20:44

## 2023-06-05 RX ADMIN — DEXTROSE MONOHYDRATE 12.5 G: 25 INJECTION, SOLUTION INTRAVENOUS at 11:35

## 2023-06-05 RX ADMIN — VANCOMYCIN HYDROCHLORIDE 750 MG: 750 INJECTION, POWDER, LYOPHILIZED, FOR SOLUTION INTRAVENOUS at 05:18

## 2023-06-05 RX ADMIN — LIDOCAINE HYDROCHLORIDE 40 MG: 20 INJECTION, SOLUTION EPIDURAL; INFILTRATION; INTRACAUDAL; PERINEURAL at 12:24

## 2023-06-05 RX ADMIN — PROPOFOL 200 MG: 10 INJECTION, EMULSION INTRAVENOUS at 12:30

## 2023-06-05 SDOH — ECONOMIC STABILITY: FOOD INSECURITY: WITHIN THE PAST 12 MONTHS, YOU WORRIED THAT YOUR FOOD WOULD RUN OUT BEFORE YOU GOT MONEY TO BUY MORE.: NEVER TRUE

## 2023-06-05 SDOH — ECONOMIC STABILITY: INCOME INSECURITY: IN THE LAST 12 MONTHS, WAS THERE A TIME WHEN YOU WERE NOT ABLE TO PAY THE MORTGAGE OR RENT ON TIME?: NO

## 2023-06-05 SDOH — HEALTH STABILITY: PHYSICAL HEALTH: ON AVERAGE, HOW MANY MINUTES DO YOU ENGAGE IN EXERCISE AT THIS LEVEL?: 0 MIN

## 2023-06-05 SDOH — HEALTH STABILITY: PHYSICAL HEALTH: ON AVERAGE, HOW MANY DAYS PER WEEK DO YOU ENGAGE IN MODERATE TO STRENUOUS EXERCISE (LIKE A BRISK WALK)?: 0 DAYS

## 2023-06-05 SDOH — ECONOMIC STABILITY: FOOD INSECURITY: WITHIN THE PAST 12 MONTHS, THE FOOD YOU BOUGHT JUST DIDN'T LAST AND YOU DIDN'T HAVE MONEY TO GET MORE.: NEVER TRUE

## 2023-06-05 ASSESSMENT — SOCIAL DETERMINANTS OF HEALTH (SDOH)
DO YOU BELONG TO ANY CLUBS OR ORGANIZATIONS SUCH AS CHURCH GROUPS UNIONS, FRATERNAL OR ATHLETIC GROUPS, OR SCHOOL GROUPS?: NO
WITHIN THE LAST YEAR, HAVE TO BEEN RAPED OR FORCED TO HAVE ANY KIND OF SEXUAL ACTIVITY BY YOUR PARTNER OR EX-PARTNER?: NO
WITHIN THE LAST YEAR, HAVE YOU BEEN AFRAID OF YOUR PARTNER OR EX-PARTNER?: NO
HOW OFTEN DO YOU ATTENT MEETINGS OF THE CLUB OR ORGANIZATION YOU BELONG TO?: NEVER
HOW OFTEN DO YOU GET TOGETHER WITH FRIENDS OR RELATIVES?: MORE THAN THREE TIMES A WEEK
HOW HARD IS IT FOR YOU TO PAY FOR THE VERY BASICS LIKE FOOD, HOUSING, MEDICAL CARE, AND HEATING?: NOT HARD AT ALL
WITHIN THE LAST YEAR, HAVE YOU BEEN HUMILIATED OR EMOTIONALLY ABUSED IN OTHER WAYS BY YOUR PARTNER OR EX-PARTNER?: NO
WITHIN THE LAST YEAR, HAVE YOU BEEN KICKED, HIT, SLAPPED, OR OTHERWISE PHYSICALLY HURT BY YOUR PARTNER OR EX-PARTNER?: NO
IN A TYPICAL WEEK, HOW MANY TIMES DO YOU TALK ON THE PHONE WITH FAMILY, FRIENDS, OR NEIGHBORS?: MORE THAN THREE TIMES A WEEK
ARE YOU MARRIED, WIDOWED, DIVORCED, SEPARATED, NEVER MARRIED, OR LIVING WITH A PARTNER?: NEVER MARRIED
HOW OFTEN DO YOU ATTEND CHURCH OR RELIGIOUS SERVICES?: NEVER

## 2023-06-05 ASSESSMENT — PAIN - FUNCTIONAL ASSESSMENT: PAIN_FUNCTIONAL_ASSESSMENT: 0-10

## 2023-06-05 ASSESSMENT — ENCOUNTER SYMPTOMS: DYSPNEA ACTIVITY LEVEL: NO INTERVAL CHANGE

## 2023-06-05 ASSESSMENT — PAIN SCALES - GENERAL
PAINLEVEL_OUTOF10: 0
PAINLEVEL_OUTOF10: 0

## 2023-06-05 ASSESSMENT — LIFESTYLE VARIABLES
HOW OFTEN DO YOU HAVE A DRINK CONTAINING ALCOHOL: NEVER
HOW MANY STANDARD DRINKS CONTAINING ALCOHOL DO YOU HAVE ON A TYPICAL DAY: PATIENT DOES NOT DRINK

## 2023-06-05 NOTE — ED NOTES
CMT here at this time to transport patient to [facility]. All paperwork and patient belongings sent with patient.       Tyron Andres, RN  06/04/23 1639

## 2023-06-05 NOTE — CONSULTS
Clinical Pharmacy Note  Vancomycin Consult    Padmini Blanc is a 78 y.o. female ordered Vancomycin for SSTI; consult received from Dr. Thomas Phelan to manage therapy. Also receiving ceftriaxone. Allergies:  Ampicillin and Penicillins     Temp max:  Temp (24hrs), Av.4 °F (36.9 °C), Min:98.1 °F (36.7 °C), Max:98.6 °F (37 °C)      Recent Labs     23  1352   WBC 14.9*       Recent Labs     23  1352   BUN 38*   CREATININE 1.2         Intake/Output Summary (Last 24 hours) at 2023 2210  Last data filed at 2023 1716  Gross per 24 hour   Intake 1550 ml   Output --   Net 1550 ml       Culture Results:      Ht Readings from Last 1 Encounters:   23 5' 3\" (1.6 m)        Wt Readings from Last 1 Encounters:   23 189 lb (85.7 kg)         Estimated Creatinine Clearance: 48 mL/min (based on SCr of 1.2 mg/dL). Assessment/Plan:  Day # 1 of vancomycin. Vancomycin 750 mg IV every 12 hours ordered. Goal -600  Predicted     Thank you for the consult.    Carlos Dempsey, PharmD
infusion   IntraVENous PRN [place] MD Ck        acetaminophen (TYLENOL) tablet 650 mg  650 mg Oral Q6H PRN [place] MD Ck        Or    acetaminophen (TYLENOL) suppository 650 mg  650 mg Rectal Q6H PRN [place] MD Ck        polyethylene glycol (GLYCOLAX) packet 17 g  17 g Oral Daily PRN [place] MD Ck        [facility] by provider] aspirin chewable tablet 81 mg  81 mg Oral Daily [place] MD Ck        [facility] [place] by provider] enoxaparin (LOVENOX) injection 40 mg  40 mg SubCUTAneous Daily [place] MD Ck        ondansetron [facility]) injection 4 mg  4 mg IntraVENous Q6H PRN [place] MD Ck        cefTRIAXone (ROCEPHIN) 2,000 mg in sodium chloride 0.9 % 50 mL IVPB (mini-bag)  2,000 mg IntraVENous Q24H [place] MD Ck        lisinopril (PRINIVIL;ZESTRIL) tablet 20 mg  20 mg Oral BID John HENRRY Vazquez, DO   20 mg at 06/04/23 2323    glucose chewable tablet 16 g  4 tablet Oral PRN [place] MD Ck        dextrose bolus 10% 125 mL  125 mL IntraVENous PRN [place] MD Ck        Or    dextrose bolus 10% 250 mL  250 mL IntraVENous PRN [place] MD Ck        glucagon (rDNA) injection 1 mg  1 mg SubCUTAneous PRN [place] MD Ck        dextrose 10 % infusion   IntraVENous Continuous PRN [place] MD Ck        vancomycin Nocona Makos) intermittent dosing (placeholder)   Other RX Gamaliel Guallpa MD        vancomycin (VANCOCIN) 750 mg in sodium chloride 0.9 % 250 mL IVPB  750 mg IntraVENous Q12H [place] MD Ck   Stopped at 06/05/23 0618    0.9 % sodium chloride infusion   IntraVENous Continuous John HENRRY Vazquez DO 75 mL/hr at 06/05/23 0516 Rate Verify at 06/05/23 0516       Past Medical History:   Diagnosis Date    Alcohol use disorder     Chronic pain syndrome 06/24/2016    Class 1 obesity     CVA (cerebral vascular accident) ([address]     Hyperlipidemia     Hypertension     Mood disorder
and D left hand today in OR per DR Malissa Villasenor  NPO for surgery  Discussed with Dr Zoe Vaughn, APRN - CNP  6/5/2023  10:20 AM

## 2023-06-05 NOTE — ANESTHESIA POSTPROCEDURE EVALUATION
Department of Anesthesiology  Postprocedure Note    Patient: Lisbet Prajapati  MRN: [id number]  YOB: 1955  Date of evaluation: 6/5/2023      Procedure Summary     Date: 06/05/23 Room / Location: [address]    Anesthesia Start: 1216 Anesthesia Stop: 1254    Procedure: INCISION AND DRAINAGE LEFT INDEX FINGER (Left: Fingers) Diagnosis:       Abrasion of left index finger with infection      (ABRASION OF LEFT INDEX FINGER WITH INFECTION)    Surgeons: Jemma Parr MD Responsible Provider: Bethanie Torres MD    Anesthesia Type: Regional, General ASA Status: 3          Anesthesia Type: Regional, General    Marzena Phase I: Marzena Score: 10    Marzena Phase II:        Anesthesia Post Evaluation    Patient location during evaluation: PACU  Patient participation: complete - patient participated  Level of consciousness: awake  Airway patency: patent  Nausea & Vomiting: no nausea and no vomiting  Complications: yes  Cardiovascular status: blood pressure returned to baseline  Respiratory status: acceptable  Hydration status: stable  Comments: Vital signs stable  OK to discharge from Stage I post anesthesia care. Care transferred from Anesthesiology department on discharge from perioperative area     Anesthesia type was changed intra-operatively due to failure of block to set up.    Multimodal analgesia pain management approach

## 2023-06-05 NOTE — FLOWSHEET NOTE
Patient admitted to [id number] via stretcher from [facility] transport. Patient is alert to self and birthday. Denies pain and discomfort. Wound to left hand swollen, warm and red. [place] to room and surroundings. Patient is not able to answer all admission questions, poor historian.

## 2023-06-05 NOTE — ANESTHESIA PROCEDURE NOTES
ultrasound visualization deposited local anesthetic around the two bundles of the brachial plexus. The needle was withdrawn and the patient was in stable condition. No complications noted during procedure.   Medications Administered  ropivacaine (NAROPIN) injection 0.5% - Perineural   30 mL - 6/5/2023 12:00:00 PM

## 2023-06-05 NOTE — BRIEF OP NOTE
Brief Postoperative Note      Patient: Elsa Vora  YOB: 1955  MRN: [id number]    Date of Procedure: 6/5/2023    Pre-Op Diagnosis Codes:     * Abrasion of left index finger with infection [S60.411A, L08.9]    Post-Op Diagnosis: Same       Procedure(s):  INCISION AND DRAINAGE LEFT INDEX FINGER    Surgeon(s):  Chris Unger MD    Assistant:  Surgical Assistant: Nikky Shannon    Anesthesia: General    Estimated Blood Loss (mL): less than 50    Complications:  none immediately apparent    Specimens:   ID Type Source Tests Collected by Time Destination   1 : 1.  Left index finger culture swab Tissue Tissue CULTURE, TISSUE Chris Unger MD 6/5/2023 1234        Implants:  * No implants in log *      Drains: * No LDAs found *    Findings:   Epidermal sloughing  Abscess centered radially at the level of the MPJ, but tracking dorsally and volarly      Electronically signed by Chris Unger MD on 6/5/2023 at 2:08 PM

## 2023-06-05 NOTE — ANESTHESIA PRE PROCEDURE
Mixed hyperlipidemia E78.2    Essential hypertension I10    Stroke ([facility]) I63.9    Anxiety F41.9    Moderate episode of recurrent major depressive disorder ([facility]) F33.1    Chronic pain syndrome G89.4    Diabetic nephropathy associated with type 2 diabetes mellitus ([facility]) E11.21    Onychomycosis of multiple toenails with type 2 diabetes mellitus ([facility]) E11.69, B35.1    Morbid obesity ([facility]) E66.01    Hemiparesis affecting dominant side as late effect of cerebrovascular accident ([facility] I69.359    Gastroesophageal reflux disease without esophagitis K21.9    Urge incontinence N39.41    Type 2 diabetes mellitus with complication, with long-term current use of insulin ([facility]) E11.8, Z79.4    Abrasion of left index finger with infection S60.411A, L08.9    DM2 (diabetes mellitus, type 2) ([facility]) E11.9    HTN (hypertension) I10    Wound infection T14. 8XXA, L08.9       Past Medical History:        Diagnosis Date    Alcohol use disorder     Chronic pain syndrome 06/24/2016    Class 1 obesity     CVA (cerebral vascular accident) ([facility]     Hyperlipidemia     Hypertension     Mood disorder ([facility]     Stroke ([facility]     Type 2 diabetes mellitus        Past Surgical History:  History reviewed. No pertinent surgical history.     Social History:    Social History     Tobacco Use    Smoking status: Never    Smokeless tobacco: Never   Substance Use Topics    Alcohol use: Not Currently                                Counseling given: No      Vital Signs (Current):   Vitals:    06/05/23 0528 06/05/23 0749 06/05/23 0755 06/05/23 1103   BP:  (!) 149/74  129/69   Pulse:  81  81   Resp:  22  20   Temp:  98.3 °F (36.8 °C)  97.3 °F (36.3 °C)   TempSrc:  Oral  Temporal   SpO2:  96% 98% 98%   Weight: 187 lb 6.3 oz (85 kg)                                                 BP Readings from Last 3 Encounters:   06/05/23 129/69   05/09/23 117/77   01/30/23 118/62       NPO Status: Time of last liquid consumption: 9194

## 2023-06-06 LAB
ALBUMIN SERPL-MCNC: 3.4 G/DL (ref 3.4–5)
ANION GAP SERPL CALCULATED.3IONS-SCNC: 13 MMOL/L (ref 3–16)
BUN SERPL-MCNC: 9 MG/DL (ref 7–20)
CALCIUM SERPL-MCNC: 8.7 MG/DL (ref 8.3–10.6)
CHLORIDE SERPL-SCNC: 105 MMOL/L (ref 99–110)
CO2 SERPL-SCNC: 21 MMOL/L (ref 21–32)
CREAT SERPL-MCNC: 0.7 MG/DL (ref 0.6–1.2)
DEPRECATED RDW RBC AUTO: 12.4 % (ref 12.4–15.4)
GFR SERPLBLD CREATININE-BSD FMLA CKD-EPI: >60 ML/MIN/{1.73_M2}
GLUCOSE BLD-MCNC: 127 MG/DL (ref 70–99)
GLUCOSE BLD-MCNC: 143 MG/DL (ref 70–99)
GLUCOSE BLD-MCNC: 202 MG/DL (ref 70–99)
GLUCOSE BLD-MCNC: 83 MG/DL (ref 70–99)
GLUCOSE BLD-MCNC: 95 MG/DL (ref 70–99)
GLUCOSE SERPL-MCNC: 95 MG/DL (ref 70–99)
HCT VFR BLD AUTO: 33.5 % (ref 36–48)
HGB BLD-MCNC: 11.3 G/DL (ref 12–16)
MAGNESIUM SERPL-MCNC: 1.6 MG/DL (ref 1.8–2.4)
MCH RBC QN AUTO: 29 PG (ref 26–34)
MCHC RBC AUTO-ENTMCNC: 33.8 G/DL (ref 31–36)
MCV RBC AUTO: 85.7 FL (ref 80–100)
PERFORMED ON: ABNORMAL
PERFORMED ON: NORMAL
PERFORMED ON: NORMAL
PHOSPHATE SERPL-MCNC: 2.4 MG/DL (ref 2.5–4.9)
PLATELET # BLD AUTO: 308 K/UL (ref 135–450)
PMV BLD AUTO: 8.8 FL (ref 5–10.5)
POTASSIUM SERPL-SCNC: 3.2 MMOL/L (ref 3.5–5.1)
RBC # BLD AUTO: 3.91 M/UL (ref 4–5.2)
SODIUM SERPL-SCNC: 139 MMOL/L (ref 136–145)
VANCOMYCIN TROUGH SERPL-MCNC: 10.1 UG/ML (ref 10–20)
WBC # BLD AUTO: 9.9 K/UL (ref 4–11)

## 2023-06-06 PROCEDURE — 2580000003 HC RX 258: Performed by: INTERNAL MEDICINE

## 2023-06-06 PROCEDURE — 6370000000 HC RX 637 (ALT 250 FOR IP): Performed by: INTERNAL MEDICINE

## 2023-06-06 PROCEDURE — 85027 COMPLETE CBC AUTOMATED: CPT

## 2023-06-06 PROCEDURE — 36415 COLL VENOUS BLD VENIPUNCTURE: CPT

## 2023-06-06 PROCEDURE — 6360000002 HC RX W HCPCS: Performed by: INTERNAL MEDICINE

## 2023-06-06 PROCEDURE — 80202 ASSAY OF VANCOMYCIN: CPT

## 2023-06-06 PROCEDURE — 94760 N-INVAS EAR/PLS OXIMETRY 1: CPT

## 2023-06-06 PROCEDURE — 6370000000 HC RX 637 (ALT 250 FOR IP): Performed by: STUDENT IN AN ORGANIZED HEALTH CARE EDUCATION/TRAINING PROGRAM

## 2023-06-06 PROCEDURE — 80069 RENAL FUNCTION PANEL: CPT

## 2023-06-06 PROCEDURE — 83735 ASSAY OF MAGNESIUM: CPT

## 2023-06-06 PROCEDURE — 1200000000 HC SEMI PRIVATE

## 2023-06-06 PROCEDURE — 2500000003 HC RX 250 WO HCPCS: Performed by: INTERNAL MEDICINE

## 2023-06-06 RX ORDER — INSULIN LISPRO 100 [IU]/ML
0-4 INJECTION, SOLUTION INTRAVENOUS; SUBCUTANEOUS NIGHTLY
Status: DISCONTINUED | OUTPATIENT
Start: 2023-06-06 | End: 2023-06-07 | Stop reason: HOSPADM

## 2023-06-06 RX ORDER — INSULIN GLARGINE 100 [IU]/ML
22 INJECTION, SOLUTION SUBCUTANEOUS NIGHTLY
Status: DISCONTINUED | OUTPATIENT
Start: 2023-06-06 | End: 2023-06-07 | Stop reason: HOSPADM

## 2023-06-06 RX ORDER — INSULIN LISPRO 100 [IU]/ML
0-4 INJECTION, SOLUTION INTRAVENOUS; SUBCUTANEOUS
Status: DISCONTINUED | OUTPATIENT
Start: 2023-06-06 | End: 2023-06-07 | Stop reason: HOSPADM

## 2023-06-06 RX ADMIN — ENOXAPARIN SODIUM 40 MG: 100 INJECTION SUBCUTANEOUS at 08:50

## 2023-06-06 RX ADMIN — VANCOMYCIN HYDROCHLORIDE 1000 MG: 1 INJECTION, POWDER, LYOPHILIZED, FOR SOLUTION INTRAVENOUS at 20:45

## 2023-06-06 RX ADMIN — AMLODIPINE BESYLATE 5 MG: 5 TABLET ORAL at 08:50

## 2023-06-06 RX ADMIN — OXYBUTYNIN CHLORIDE 10 MG: 10 TABLET, EXTENDED RELEASE ORAL at 20:42

## 2023-06-06 RX ADMIN — CEFTRIAXONE SODIUM 2000 MG: 2 INJECTION, POWDER, FOR SOLUTION INTRAMUSCULAR; INTRAVENOUS at 14:56

## 2023-06-06 RX ADMIN — CALCIUM 500 MG: 500 TABLET ORAL at 08:50

## 2023-06-06 RX ADMIN — POTASSIUM CHLORIDE 40 MEQ: 1500 TABLET, EXTENDED RELEASE ORAL at 17:07

## 2023-06-06 RX ADMIN — LISINOPRIL 20 MG: 20 TABLET ORAL at 08:50

## 2023-06-06 RX ADMIN — SODIUM CHLORIDE, PRESERVATIVE FREE 10 ML: 5 INJECTION INTRAVENOUS at 20:42

## 2023-06-06 RX ADMIN — MAGNESIUM SULFATE HEPTAHYDRATE 2000 MG: 40 INJECTION, SOLUTION INTRAVENOUS at 10:15

## 2023-06-06 RX ADMIN — SODIUM PHOSPHATE, MONOBASIC, MONOHYDRATE AND SODIUM PHOSPHATE, DIBASIC, ANHYDROUS 10 MMOL: 142; 276 INJECTION, SOLUTION INTRAVENOUS at 11:49

## 2023-06-06 RX ADMIN — LISINOPRIL 20 MG: 20 TABLET ORAL at 20:42

## 2023-06-06 RX ADMIN — VANCOMYCIN HYDROCHLORIDE 1000 MG: 1 INJECTION, POWDER, LYOPHILIZED, FOR SOLUTION INTRAVENOUS at 10:08

## 2023-06-06 RX ADMIN — ASPIRIN 81 MG: 81 TABLET, CHEWABLE ORAL at 08:50

## 2023-06-06 RX ADMIN — ATORVASTATIN CALCIUM 20 MG: 20 TABLET, FILM COATED ORAL at 08:50

## 2023-06-06 RX ADMIN — PANTOPRAZOLE SODIUM 40 MG: 40 TABLET, DELAYED RELEASE ORAL at 05:48

## 2023-06-06 RX ADMIN — INSULIN GLARGINE 22 UNITS: 100 INJECTION, SOLUTION SUBCUTANEOUS at 20:42

## 2023-06-06 RX ADMIN — SODIUM CHLORIDE, PRESERVATIVE FREE 10 ML: 5 INJECTION INTRAVENOUS at 08:51

## 2023-06-06 RX ADMIN — MULTIPLE VITAMINS W/ MINERALS TAB 1 TABLET: TAB at 08:50

## 2023-06-06 ASSESSMENT — PAIN SCALES - GENERAL: PAINLEVEL_OUTOF10: 0

## 2023-06-06 NOTE — DISCHARGE INSTRUCTIONS
Call for increase in pain or swelling in operative extremity. Call for loss of movement or sensation in operative extremity. Call for fever over 101 F. Three times a day perform warm water with antibacterial liquid soap soaks for 15 to 20 minutes each time. Then redress wound with nonstick dressing then gauze roll  Gently exercise hand and fingers three times a day to prevent stiffness. Take antibiotics as ordered until completed.    Followup Dr Ericka Ayala in office in 7-10 days  [facility], [address] [place] [address], [address] [place] [address], [phone]

## 2023-06-06 NOTE — OP NOTE
but some volar as well. This extended into the  first web space. The overlying sloughed epidermis was excised with a  #15 blade scalpel. There was a small poke hole over the radial aspect  of the MP joint region of the index finger and this is likely where the  entry point was for her hand infection. The dermis was then incised  longitudinally at this area and gently spread with a curved hemostat. Purulent material was decompressed and this was swabbed for routine  cultures. Loculations were broken up and the infection cavity tracked  both volarly and dorsally, but was more pronounced dorsally. Given the  extent of the abscess/infection cavity, I made a smaller counter  incision over the dorsal aspect of the second metacarpal.  I then  thoroughly irrigated the infection site with 4 liters of normal saline. No necrotic material was then seen. No further purulence was seen. Marcaine with epinephrine was then used and applied in the open wound  and the tourniquet was let down. Hemostasis was obtained with direct  pressure. The small dorsal incision was left open to promote drainage. The radial incision was gently reapproximated with interrupted nylon in  a loose fashion to still promote and allow drainage. It was cleaned  with wet and dries and then dressed in the usual fashion. The drapes  were removed. The patient was then transferred back to the PACU.         Abby Escobar MD    D: 06/05/2023 16:25:12       T: 06/05/2023 17:13:11     [email or web address]  Job#: [id number]     Doc#: [id number]

## 2023-06-07 VITALS
BODY MASS INDEX: 33.04 KG/M2 | HEART RATE: 67 BPM | WEIGHT: 186.51 LBS | RESPIRATION RATE: 16 BRPM | OXYGEN SATURATION: 96 % | TEMPERATURE: 98.5 F | SYSTOLIC BLOOD PRESSURE: 139 MMHG | DIASTOLIC BLOOD PRESSURE: 77 MMHG

## 2023-06-07 LAB
ALBUMIN SERPL-MCNC: 3.6 G/DL (ref 3.4–5)
ANION GAP SERPL CALCULATED.3IONS-SCNC: 11 MMOL/L (ref 3–16)
BUN SERPL-MCNC: 10 MG/DL (ref 7–20)
CALCIUM SERPL-MCNC: 8.4 MG/DL (ref 8.3–10.6)
CHLORIDE SERPL-SCNC: 106 MMOL/L (ref 99–110)
CO2 SERPL-SCNC: 22 MMOL/L (ref 21–32)
CREAT SERPL-MCNC: 0.8 MG/DL (ref 0.6–1.2)
DEPRECATED RDW RBC AUTO: 12.7 % (ref 12.4–15.4)
GFR SERPLBLD CREATININE-BSD FMLA CKD-EPI: >60 ML/MIN/{1.73_M2}
GLUCOSE BLD-MCNC: 107 MG/DL (ref 70–99)
GLUCOSE BLD-MCNC: 203 MG/DL (ref 70–99)
GLUCOSE SERPL-MCNC: 159 MG/DL (ref 70–99)
HCT VFR BLD AUTO: 34.6 % (ref 36–48)
HGB BLD-MCNC: 11.6 G/DL (ref 12–16)
MAGNESIUM SERPL-MCNC: 1.5 MG/DL (ref 1.8–2.4)
MCH RBC QN AUTO: 28.9 PG (ref 26–34)
MCHC RBC AUTO-ENTMCNC: 33.6 G/DL (ref 31–36)
MCV RBC AUTO: 86.2 FL (ref 80–100)
PERFORMED ON: ABNORMAL
PERFORMED ON: ABNORMAL
PHOSPHATE SERPL-MCNC: 2.3 MG/DL (ref 2.5–4.9)
PLATELET # BLD AUTO: 312 K/UL (ref 135–450)
PMV BLD AUTO: 9 FL (ref 5–10.5)
POTASSIUM SERPL-SCNC: 3.8 MMOL/L (ref 3.5–5.1)
RBC # BLD AUTO: 4.02 M/UL (ref 4–5.2)
SODIUM SERPL-SCNC: 139 MMOL/L (ref 136–145)
WBC # BLD AUTO: 9.7 K/UL (ref 4–11)

## 2023-06-07 PROCEDURE — 6370000000 HC RX 637 (ALT 250 FOR IP): Performed by: STUDENT IN AN ORGANIZED HEALTH CARE EDUCATION/TRAINING PROGRAM

## 2023-06-07 PROCEDURE — 6370000000 HC RX 637 (ALT 250 FOR IP): Performed by: INTERNAL MEDICINE

## 2023-06-07 PROCEDURE — 2580000003 HC RX 258: Performed by: INTERNAL MEDICINE

## 2023-06-07 PROCEDURE — 6360000002 HC RX W HCPCS: Performed by: INTERNAL MEDICINE

## 2023-06-07 PROCEDURE — 2500000003 HC RX 250 WO HCPCS: Performed by: INTERNAL MEDICINE

## 2023-06-07 PROCEDURE — 80069 RENAL FUNCTION PANEL: CPT

## 2023-06-07 PROCEDURE — 94760 N-INVAS EAR/PLS OXIMETRY 1: CPT

## 2023-06-07 PROCEDURE — 83735 ASSAY OF MAGNESIUM: CPT

## 2023-06-07 PROCEDURE — 85027 COMPLETE CBC AUTOMATED: CPT

## 2023-06-07 PROCEDURE — 36415 COLL VENOUS BLD VENIPUNCTURE: CPT

## 2023-06-07 RX ORDER — INSULIN GLARGINE 100 [IU]/ML
25 INJECTION, SOLUTION SUBCUTANEOUS NIGHTLY
Qty: 5 ADJUSTABLE DOSE PRE-FILLED PEN SYRINGE | Refills: 3 | Status: SHIPPED
Start: 2023-06-07

## 2023-06-07 RX ORDER — POTASSIUM CHLORIDE 1.5 G/1.77G
20 POWDER, FOR SOLUTION ORAL DAILY
Qty: 30 PACKET | Refills: 0 | Status: SHIPPED | OUTPATIENT
Start: 2023-06-07 | End: 2023-07-07

## 2023-06-07 RX ORDER — INSULIN GLARGINE 100 [IU]/ML
25 INJECTION, SOLUTION SUBCUTANEOUS NIGHTLY
Qty: 3 ADJUSTABLE DOSE PRE-FILLED PEN SYRINGE | Refills: 0 | Status: SHIPPED | OUTPATIENT
Start: 2023-06-07 | End: 2023-06-07 | Stop reason: SDUPTHER

## 2023-06-07 RX ORDER — ASPIRIN 81 MG/1
81 TABLET, CHEWABLE ORAL DAILY
Qty: 30 TABLET | Refills: 0
Start: 2023-06-08

## 2023-06-07 RX ORDER — CEPHALEXIN 500 MG/1
500 CAPSULE ORAL 3 TIMES DAILY
Qty: 15 CAPSULE | Refills: 0 | Status: SHIPPED | OUTPATIENT
Start: 2023-06-07 | End: 2023-06-12

## 2023-06-07 RX ORDER — DOXYCYCLINE HYCLATE 100 MG
100 TABLET ORAL 2 TIMES DAILY
Qty: 10 TABLET | Refills: 0 | Status: SHIPPED | OUTPATIENT
Start: 2023-06-07 | End: 2023-06-12

## 2023-06-07 RX ORDER — INSULIN GLARGINE 100 [IU]/ML
25 INJECTION, SOLUTION SUBCUTANEOUS NIGHTLY
Qty: 3 ADJUSTABLE DOSE PRE-FILLED PEN SYRINGE | Refills: 0 | Status: SHIPPED
Start: 2023-06-07 | End: 2023-06-07 | Stop reason: HOSPADM

## 2023-06-07 RX ADMIN — AMLODIPINE BESYLATE 5 MG: 5 TABLET ORAL at 08:29

## 2023-06-07 RX ADMIN — MULTIPLE VITAMINS W/ MINERALS TAB 1 TABLET: TAB at 08:29

## 2023-06-07 RX ADMIN — SODIUM CHLORIDE, PRESERVATIVE FREE 10 ML: 5 INJECTION INTRAVENOUS at 08:30

## 2023-06-07 RX ADMIN — ENOXAPARIN SODIUM 40 MG: 100 INJECTION SUBCUTANEOUS at 08:32

## 2023-06-07 RX ADMIN — CALCIUM 500 MG: 500 TABLET ORAL at 08:29

## 2023-06-07 RX ADMIN — PANTOPRAZOLE SODIUM 40 MG: 40 TABLET, DELAYED RELEASE ORAL at 06:25

## 2023-06-07 RX ADMIN — INSULIN LISPRO 1 UNITS: 100 INJECTION, SOLUTION INTRAVENOUS; SUBCUTANEOUS at 14:02

## 2023-06-07 RX ADMIN — VANCOMYCIN HYDROCHLORIDE 1000 MG: 1 INJECTION, POWDER, LYOPHILIZED, FOR SOLUTION INTRAVENOUS at 08:33

## 2023-06-07 RX ADMIN — MAGNESIUM SULFATE HEPTAHYDRATE 2000 MG: 40 INJECTION, SOLUTION INTRAVENOUS at 13:52

## 2023-06-07 RX ADMIN — SODIUM PHOSPHATE, MONOBASIC, MONOHYDRATE AND SODIUM PHOSPHATE, DIBASIC, ANHYDROUS 10 MMOL: 142; 276 INJECTION, SOLUTION INTRAVENOUS at 15:15

## 2023-06-07 RX ADMIN — ASPIRIN 81 MG: 81 TABLET, CHEWABLE ORAL at 08:29

## 2023-06-07 RX ADMIN — ATORVASTATIN CALCIUM 20 MG: 20 TABLET, FILM COATED ORAL at 08:29

## 2023-06-07 RX ADMIN — LISINOPRIL 20 MG: 20 TABLET ORAL at 08:29

## 2023-06-07 NOTE — DISCHARGE INSTR - COC
hypertension I10    Stroke ([facility] I63.9    Anxiety F41.9    Moderate episode of recurrent major depressive disorder ([facility]) F33.1    Chronic pain syndrome G89.4    Diabetic nephropathy associated with type 2 diabetes mellitus ([facility]) E11.21    Onychomycosis of multiple toenails with type 2 diabetes mellitus ([facility]) E11.69, B35.1    Morbid obesity ([facility]) E66.01    Hemiparesis affecting dominant side as late effect of cerebrovascular accident ([facility] I69.359    Gastroesophageal reflux disease without esophagitis K21.9    Urge incontinence N39.41    Type 2 diabetes mellitus with complication, with long-term current use of insulin ([facility]) E11.8, Z79.4    Abrasion of left index finger with infection S60.411A, L08.9    DM2 (diabetes mellitus, type 2) ([facility]) E11.9    HTN (hypertension) I10    Wound infection T14. 8XXA, L08.9       Isolation/Infection:   Isolation            No Isolation          Patient Infection Status       None to display            Nurse Assessment:  Last Vital Signs: /77   Pulse 67   Temp 98.5 °F (36.9 °C) (Oral)   Resp 16   Wt 186 lb 8.2 oz (84.6 kg)   SpO2 96%   BMI 33.04 kg/m²     Last documented pain score (0-10 scale): Pain Level: 0  Last Weight:   Wt Readings from Last 1 Encounters:   06/07/23 186 lb 8.2 oz (84.6 kg)     Mental Status:  {IP PT MENTAL STATUS:21323}    IV Access:  { DAVIDA IV ACCESS:716930389}    Nursing Mobility/ADLs:  Walking   {CHP DME QLVP:646686639}  Transfer  {CHP DME QRYS:206564426}  Bathing  {CHP DME VUOT:687970939}  Dressing  {CHP DME OMCF:644656187}  Toileting  {CHP DME DINC:983092014}  Feeding  {CHP DME GVJL:333052994}  Med Admin  {CHP DME XWLI:919142884}  Med Delivery   { DAVIDA MED Delivery:041763726}    Wound Care Documentation and Therapy:  Wound 06/04/23 Hand Left;Posterior Red, swollen with open area 1 CM X1 CM in middle (Active)   Wound Etiology Surgical 06/05/23 1700   Dressing Status New dressing applied 06/06/23 2308   Wound Cleansed Other (Comment) 06/06/23 2301

## 2023-06-07 NOTE — DISCHARGE SUMMARY
Discharge Summary           Name:  Duarte Ro /Age/Sex: 1955  (78 y.o. female)   MRN & CSN:  [id number] & [id number] Admission Date/Time: 2023 12:26 PM   Attending:  Kiara Nguyen MD Discharging Physician: Kiara Nguyen MD     Hospital Course:     Duarte Ro is a 78 y.o.  female with past medical history of hypertension, hyperlipidemia, diabetes mellitus type 2 and history of CVA was admitted on 2023 for evaluation of worsening wound/erythema to the left thenar eminence secondary to trauma about a week ago. Seen by orthopedics. Patient underwent  I&D left index finger 2023. Cultures were sent, shows no growth to date. Patient was initially treated with empiric IV antibiotic ceftriaxone/vancomycin. Changed to p.o. cephalexin/doxycycline upon discharge. Patient to follow-up with orthopedics as outpatient. Discussed with Ortho, okay to be discharged today. Patient is currently being discharged in stable condition on 2023 with advised to follow-up closely with PCP/orthopedics. The patient expressed appropriate understanding of and agreement with the discharge recommendations, medications, and plan.      Discharge diagnosis    Sepsis secondary to left hand wound/cellulitis-present on admission  Hypertension  Diabetes mellitus type 2  Hyperlipidemia  History of CVA  Hypokalemia  Hypophosphatemia    Consults this admission:  [facility] CONSULT TO ORTHOPEDIC SURGERY    Discharge Medications:        Medication List        START taking these medications      aspirin 81 MG chewable tablet  Take 1 tablet by mouth daily  Start taking on: 2023     cephALEXin 500 MG capsule  Commonly known as: KEFLEX  Take 1 capsule by mouth 3 times daily for 5 days     doxycycline hyclate 100 MG tablet  Commonly known as: VIBRA-TABS  Take 1 tablet by mouth 2 times daily for 5 days            CHANGE how you take these medications      insulin

## 2023-06-07 NOTE — PLAN OF CARE
Problem: Discharge Planning  Goal: Discharge to home or other facility with appropriate resources  6/7/2023 1527 by Jesus Randolph RN  Outcome: Completed  6/7/2023 1450 by Jesus Randolph RN  Outcome: Progressing  6/7/2023 1103 by Jesus Randolph RN  Outcome: Progressing  Flowsheets (Taken 6/7/2023 0800)  Discharge to home or other facility with appropriate resources: Identify barriers to discharge with patient and caregiver     Problem: Safety - Adult  Goal: Free from fall injury  6/7/2023 1527 by Jesus Randolph RN  Outcome: Completed  6/7/2023 1450 by Jeuss Randolph RN  Outcome: Progressing  6/7/2023 1103 by Jesus Randolph RN  Outcome: Progressing     Problem: Neurosensory - Adult  Goal: Achieves stable or improved neurological status  6/7/2023 1527 by Jesus Randolph RN  Outcome: Completed  6/7/2023 1450 by Jesus Randolph RN  Outcome: Progressing  6/7/2023 1103 by Jesus Randolph RN  Outcome: Progressing  Flowsheets (Taken 6/7/2023 0800)  Achieves stable or improved neurological status: Assess for and report changes in neurological status  Goal: Absence of seizures  6/7/2023 1527 by Jesus Randolph RN  Outcome: Completed  6/7/2023 1450 by Jesus Randolph RN  Outcome: Progressing  6/7/2023 1103 by Jesus Randloph RN  Outcome: Progressing  Flowsheets (Taken 6/7/2023 0800)  Absence of seizures: Monitor for seizure activity.   If seizure occurs, document type and location of movements and any associated apnea  Goal: Remains free of injury related to seizures activity  6/7/2023 1527 by Jesus Randolph RN  Outcome: Completed  6/7/2023 1450 by Jesus Randolph RN  Outcome: Progressing  6/7/2023 1103 by Jesus Randolph RN  Outcome: Progressing  Flowsheets (Taken 6/7/2023 0800)  Remains free of injury related to seizure activity: Maintain airway, patient safety  and administer oxygen as ordered  Goal: Achieves maximal functionality and self care  6/7/2023 1527 by Jesus Randolph
Problem: Discharge Planning  Goal: Discharge to home or other facility with appropriate resources  Outcome: Progressing     Problem: Safety - Adult  Goal: Free from fall injury  Outcome: Progressing     Problem: Neurosensory - Adult  Goal: Achieves stable or improved neurological status  Outcome: Progressing  Goal: Absence of seizures  Outcome: Progressing  Goal: Remains free of injury related to seizures activity  Outcome: Progressing  Goal: Achieves maximal functionality and self care  Outcome: Progressing     Problem: Respiratory - Adult  Goal: Achieves optimal ventilation and oxygenation  Outcome: Progressing     Problem: Skin/Tissue Integrity - Adult  Goal: Skin integrity remains intact  Outcome: Progressing  Goal: Incisions, wounds, or drain sites healing without S/S of infection  Outcome: Progressing  Goal: Oral mucous membranes remain intact  Outcome: Progressing     Problem: Musculoskeletal - Adult  Goal: Return mobility to safest level of function  Outcome: Progressing  Goal: Maintain proper alignment of affected body part  Outcome: Progressing  Goal: Return ADL status to a safe level of function  Outcome: Progressing     Problem: Metabolic/Fluid and Electrolytes - Adult  Goal: Electrolytes maintained within normal limits  Outcome: Progressing  Goal: Hemodynamic stability and optimal renal function maintained  Outcome: Progressing  Goal: Glucose maintained within prescribed range  Outcome: Progressing     Problem: ABCDS Injury Assessment  Goal: Absence of physical injury  Outcome: Progressing     Problem: Skin/Tissue Integrity  Goal: Absence of new skin breakdown  Description: 1. Monitor for areas of redness and/or skin breakdown  2. Assess vascular access sites hourly  3. Every 4-6 hours minimum:  Change oxygen saturation probe site  4.   Every 4-6 hours:  If on nasal continuous positive airway pressure, respiratory therapy assess nares and determine need for appliance change or resting
Problem: Discharge Planning  Goal: Discharge to home or other facility with appropriate resources  Outcome: Progressing  Flowsheets  Taken 6/5/2023 0058  Discharge to home or other facility with appropriate resources:   Identify barriers to discharge with patient and caregiver   Arrange for needed discharge resources and transportation as appropriate   Identify discharge learning needs (meds, wound care, etc)   Arrange for interpreters to assist at discharge as needed   Refer to discharge planning if patient needs post-hospital services based on physician order or complex needs related to functional status, cognitive ability or social support system  Taken 6/4/2023 2358  Discharge to home or other facility with appropriate resources:   Identify barriers to discharge with patient and caregiver   Arrange for needed discharge resources and transportation as appropriate   Identify discharge learning needs (meds, wound care, etc)     Problem: Safety - Adult  Goal: Free from fall injury  Outcome: Progressing     Problem: Neurosensory - Adult  Goal: Achieves stable or improved neurological status  Outcome: Progressing  Goal: Absence of seizures  Outcome: Progressing  Goal: Remains free of injury related to seizures activity  Outcome: Progressing  Goal: Achieves maximal functionality and self care  Outcome: Progressing     Problem: Respiratory - Adult  Goal: Achieves optimal ventilation and oxygenation  Outcome: Progressing     Problem: Skin/Tissue Integrity - Adult  Goal: Skin integrity remains intact  Outcome: Progressing  Goal: Incisions, wounds, or drain sites healing without S/S of infection  Outcome: Progressing  Goal: Oral mucous membranes remain intact  Outcome: Progressing     Problem: Musculoskeletal - Adult  Goal: Return mobility to safest level of function  Outcome: Progressing  Goal: Maintain proper alignment of affected body part  Outcome: Progressing  Goal: Return ADL status to a safe level of
Problem: Infection - Adult  Goal: Absence of infection at discharge  6/5/2023 1000 by Elizabeth Cueto RN  Outcome: Progressing  Flowsheets (Taken 6/5/2023 0815)  Absence of infection at discharge: Assess and monitor for signs and symptoms of infection  6/5/2023 0100 by Winda Cowden, RN  Outcome: Progressing  Goal: Absence of infection during hospitalization  Recent Flowsheet Documentation  Taken 6/5/2023 0815 by Elizabeth Cueto RN  Absence of infection during hospitalization: Assess and monitor for signs and symptoms of infection  6/5/2023 0100 by Winda Cowden, RN  Outcome: Progressing  Goal: Absence of fever/infection during anticipated neutropenic period  Recent Flowsheet Documentation  Taken 6/5/2023 0815 by Elizabeth Cueto RN  Absence of fever/infection during anticipated neutropenic period: Monitor white blood cell count  6/5/2023 0100 by Winda Cowden, RN  Outcome: Progressing     Problem: Skin/Tissue Integrity  Goal: Absence of new skin breakdown  Description: 1. Monitor for areas of redness and/or skin breakdown  2. Assess vascular access sites hourly  3. Every 4-6 hours minimum:  Change oxygen saturation probe site  4. Every 4-6 hours:  If on nasal continuous positive airway pressure, respiratory therapy assess nares and determine need for appliance change or resting period.   6/5/2023 1000 by Elizabeth Cueto RN  Outcome: Progressing  6/5/2023 0100 by Winda Cowden, RN  Outcome: Progressing
Problem: Skin/Tissue Integrity - Adult  Goal: Incisions, wounds, or drain sites healing without S/S of infection  6/6/2023 2249 by Kingston Bowman RN  Outcome: Progressing  Flowsheets (Taken 6/6/2023 1413 by Deejay Diallo RN)  Incisions, Wounds, or Drain Sites Healing Without Sign and Symptoms of Infection: Implement wound care per orders  6/6/2023 1411 by Deejay Diallo RN  Outcome: Progressing  Flowsheets (Taken 6/6/2023 0800)  Incisions, Wounds, or Drain Sites Healing Without Sign and Symptoms of Infection: TWICE DAILY: Assess and document skin integrity
range  Outcome: Progressing     Problem: Confusion  Goal: Confusion, delirium, dementia, or psychosis is improved or at baseline  Description: INTERVENTIONS:  1. Assess for possible contributors to thought disturbance, including medications, impaired vision or hearing, underlying metabolic abnormalities, dehydration, psychiatric diagnoses, and notify attending LIP  2. [place] high risk fall precautions, as indicated  3. Provide frequent short contacts to provide reality reorientation, refocusing and direction  4. Decrease environmental stimuli, including noise as appropriate  5. Monitor and intervene to maintain adequate nutrition, hydration, elimination, sleep and activity  6. If unable to ensure safety without constant attention obtain sitter and review sitter guidelines with assigned personnel  7.  Initiate Psychosocial CNS and Spiritual Care consult, as indicated  Outcome: Progressing     Problem: Chronic Conditions and Co-morbidities  Goal: Patient's chronic conditions and co-morbidity symptoms are monitored and maintained or improved  Outcome: Progressing
Problem: Chronic Conditions and Co-morbidities  Goal: Patient's chronic conditions and co-morbidity symptoms are monitored and maintained or improved  Outcome: Progressing  Flowsheets (Taken 6/5/2023 2313)  Care Plan - Patient's Chronic Conditions and Co-Morbidity Symptoms are Monitored and Maintained or Improved: Update acute care plan with appropriate goals if chronic or comorbid symptoms are exacerbated and prevent overall improvement and discharge     Problem: Pain  Goal: Verbalizes/displays adequate comfort level or baseline comfort level  Outcome: Progressing
appropriate  5. Monitor and intervene to maintain adequate nutrition, hydration, elimination, sleep and activity  6. If unable to ensure safety without constant attention obtain sitter and review sitter guidelines with assigned personnel  7.  Initiate Psychosocial CNS and Spiritual Care consult, as indicated  6/7/2023 1450 by Liane Sadler RN  Outcome: Progressing  6/7/2023 1103 by Liane Sadler RN  Outcome: Progressing  Flowsheets (Taken 6/7/2023 0800)  Effect of thought disturbance (confusion, delirium, dementia, or psychosis) are managed with adequate functional status: Assess for contributors to thought disturbance, including medications, impaired vision or hearing, underlying metabolic abnormalities, dehydration, psychiatric diagnoses, notify LIP     Problem: Chronic Conditions and Co-morbidities  Goal: Patient's chronic conditions and co-morbidity symptoms are monitored and maintained or improved  6/7/2023 1450 by Liane Sadler RN  Outcome: Progressing  6/7/2023 1103 by Liane Sadler RN  Outcome: Progressing  Flowsheets (Taken 6/7/2023 0800)  Care Plan - Patient's Chronic Conditions and Co-Morbidity Symptoms are Monitored and Maintained or Improved: Monitor and assess patient's chronic conditions and comorbid symptoms for stability, deterioration, or improvement     Problem: Pain  Goal: Verbalizes/displays adequate comfort level or baseline comfort level  6/7/2023 1450 by Liane Sadler RN  Outcome: Progressing  6/7/2023 1103 by Liane Sadler RN  Outcome: Progressing

## 2023-06-07 NOTE — CARE COORDINATION
06/07/23 1257   IMM Letter   IMM Letter given to Patient/Family/Significant other/Guardian/POA/by: Education provided to patient. Patient reported no questions and verbalized understanding. Patient made aware that he/she has 4 hours prior to discharging from hospital to decide if he/she wishes to pursue the Medicare appeal process. Patient verbalized readiness for discharge.    IMM Letter date given: 06/07/23   IMM Letter time given: 3179     Electronically signed by Rob Carr RN on 6/7/2023 at 12:58 PM
CASE MANAGEMENT DISCHARGE SUMMARY:    DISCHARGE DATE: 6/7/2023    DISCHARGED TO: home     TRANSPORTATION: via family             TIME: TBD by patient and bedside RN    COMMENTS: Patient, patient's sister Odalys Jack), and bedside RN aware of discharge plan and timeline.     Electronically signed by Jani Mane RN on 6/7/2023 at 12:51 PM
Case Management Assessment  Initial Evaluation    Date/Time of Evaluation: 6/5/2023 4:02 PM  Assessment Completed by: NAVIN Richey, [place]    If patient is discharged prior to next notation, then this note serves as note for discharge by case management. Patient Name: Blaise Carmona                   YOB: 1955  Diagnosis: Wound infection [T14. 8XXA, L08.9]  Foreign body of finger of left hand with infection [S60.459A, L08.9]  Abrasion of hand with infection, left, initial encounter [T43.071B, L08.9]                   Date / Time: 6/4/2023 12:26 PM    Patient Admission Status: Inpatient   Readmission Risk (Low < 19, Mod (19-27), High > 27): Readmission Risk Score: 14.3    Current PCP: Bertha Beverly MD  PCP verified by ? Yes    Chart Reviewed: Yes      History Provided by: Patient  Patient Orientation: Alert and Oriented    Patient Cognition: Alert    Hospitalization in the last 30 days (Readmission):  No    If yes, Readmission Assessment in  Navigator will be completed. Advance Directives:      Code Status: Full Code   Patient's Primary Decision Maker is: Legal Next of Kin    Primary Decision Maker: Zach Benoit - Brother/Sister - [phone]    Discharge Planning:    Patient lives with: Family Members Type of Home: Apartment  Primary Care Giver: Self  Patient Support Systems include: Family Members, Friends/Neighbors   Current Financial resources: Medicare, Medicaid  Current community resources: None  Current services prior to admission: None            Current DME:              Type of Home Care services:  None    ADLS  Prior functional level: Assistance with the following:, Housework, Shopping  Current functional level:      PT AM-PAC:   /24  OT AM-PAC:   /24    Family can provide assistance at DC: Yes  Would you like Case Management to discuss the discharge plan with any other family members/significant others, and if so, who?  Yes (sister if needed.)  Plans to Return to
Advised patient/agent/surrogate to review completed ACP document and update if needed with changes in condition, patient preferences or care setting    [x] This note routed to one or more involved healthcare providers Jimmie Corcoran MD primary care physician. Respectfully submitted,    Jane HOLDEN, [facility]   [phone]    Electronically signed by NAVIN Ortiz, LSW on 6/5/2023 at 4:04 PM

## 2023-06-08 ENCOUNTER — TELEPHONE (OUTPATIENT)
Dept: INTERNAL MEDICINE CLINIC | Age: 68
End: 2023-06-08

## 2023-06-08 ENCOUNTER — CARE COORDINATION (OUTPATIENT)
Dept: CASE MANAGEMENT | Age: 68
End: 2023-06-08

## 2023-06-08 DIAGNOSIS — L08.9 WOUND INFECTION: Primary | ICD-10-CM

## 2023-06-08 DIAGNOSIS — T14.8XXA WOUND INFECTION: Primary | ICD-10-CM

## 2023-06-08 LAB
BACTERIA BLD CULT ORG #2: NORMAL
BACTERIA BLD CULT ORG #2: NORMAL
BACTERIA BLD CULT: NORMAL
BACTERIA BLD CULT: NORMAL

## 2023-06-08 PROCEDURE — 1111F DSCHRG MED/CURRENT MED MERGE: CPT

## 2023-06-08 NOTE — TELEPHONE ENCOUNTER
Patient's sister Ryan Robles called. Chris Josue  was D/C from the hospital yesterday. Patient has had Diarrhea for the past 3 days,    Her sister wants to know if she can give her anti-diarrheal medication. Pt had hand surgery and just started taking two antibiotics this morning.      [address]

## 2023-06-08 NOTE — TELEPHONE ENCOUNTER
Patient can get antidiarrheal medication for once and if the diarrhea continues please advise the sister to call [facility] or to see a provider in our office as soon as possible for evaluation and treatment.

## 2023-06-08 NOTE — PROGRESS NOTES
4 Eyes Skin Assessment     NAME:  Raymundo Toscano  YOB: 1955  MEDICAL RECORD NUMBER:  [id number]    The patient is being assessed for  Admission    I agree that at least one RN has performed a thorough Head to Toe Skin Assessment on the patient. ALL assessment sites listed below have been assessed. Areas assessed by both nurses:    Head, Face, Ears, Shoulders, Back, Chest, Arms, Elbows, Hands, Sacrum. Buttock, Coccyx, Ischium, Legs. Feet and Heels, and Under Medical Devices         Does the Patient have a Wound? Yes wound(s) were present on assessment.  LDA wound assessment was Initiated and completed by RN       Shaheen Prevention initiated by RN: Yes  Wound Care Orders initiated by RN: Yes    Pressure Injury (Stage 3,4, Unstageable, DTI, NWPT, and Complex wounds) if present, place Wound referral order by RN under : Yes    New Ostomies, if present place, Ostomy referral order under : No     Nurse 1 eSignature: Electronically signed by Patricia Thompson RN on 6/4/23 at 11:54 PM EDT    **SHARE this note so that the co-signing nurse can place an eSignature**    Nurse 2 eSignature: {Esignature:[id number]}
Admitted to PACU from OR, resting quietly-no c/o pain at this time, will continue to monitor
Back to room from surgery. VSS, patient is alert, denies any pain at this time. RN will reassess.
[facility]  Hypoglycemia Event and Prevention Plan      NAME: Blaise Carmona  MEDICAL RECORD NUMBER:  [id number]  AGE: 78 y.o. GENDER: female  : 1955  EPISODE DATE:  2023     Data     Recent Labs     23  1110 23  1254 23  1646 23  1957 23  0011 23  0750   POCGLU 66* 91 100* 115* 83 95       Medications  Scheduled Medications:   calcium elemental  500 mg Oral Daily    therapeutic multivitamin-minerals  1 tablet Oral Daily    atorvastatin  20 mg Oral Daily    oxybutynin  10 mg Oral Nightly    amLODIPine  5 mg Oral Daily    insulin glargine  50 Units SubCUTAneous Nightly    pantoprazole  40 mg Oral QAM AC    sodium chloride flush  5-40 mL IntraVENous 2 times per day    aspirin  81 mg Oral Daily    enoxaparin  40 mg SubCUTAneous Daily    cefTRIAXone (ROCEPHIN) IV  2,000 mg IntraVENous Q24H    lisinopril  20 mg Oral BID    vancomycin (VANCOCIN) intermittent dosing (placeholder)   Other RX Placeholder    vancomycin  750 mg IntraVENous Q12H       Diet  Current diet/supplement order: ADULT DIET; Regular; 3 carb choices (45 gm/meal); No Caffeine     Recorded PO: PO Meals Eaten (%): 51 - 75% last meal in flowsheets      Action      Total dose of insulin yesterday = 0 units. Physician Notified of event: Yes   Vignesh Ye MD    Recommend reducing Lantus and adding correction Humalog.       Responce     Achieved POCT Blood Glucose greater than 70 mg/dl: Yes      Medication plan updated: Yes       Electronically signed by Chencho Connelly RN on 2023 at 8:30 AM
Clinical Pharmacy Note  Vancomycin Consult    Daniel Palomino is a 78 y.o. female ordered Vancomycin for SSTI; consult received from Dr. Mahogany Jones to manage therapy. Also receiving ceftriaxone. Allergies:  Ampicillin and Penicillins     Temp max:  Temp (24hrs), Av °F (36.7 °C), Min:97.3 °F (36.3 °C), Max:98.7 °F (37.1 °C)      Recent Labs     23  1352 23  0811 23  0731   WBC 14.9* 10.1 9.9       Recent Labs     23  1352 23  0811 23  0731   BUN 38* 19 9   CREATININE 1.2 0.8 0.7         Intake/Output Summary (Last 24 hours) at 2023 0901  Last data filed at 2023 0521  Gross per 24 hour   Intake 700 ml   Output 2450 ml   Net -1750 ml       Culture Results:  pending    Ht Readings from Last 1 Encounters:   23 5' 3\" (1.6 m)        Wt Readings from Last 1 Encounters:   23 188 lb 15 oz (85.7 kg)         Estimated Creatinine Clearance: 82 mL/min (based on SCr of 0.7 mg/dL). Assessment:  Day # 3 of vancomycin. Current regimen: 750 mg every 12 hours  Vancomycin level: 10.1 mg/L  Predicted AUC: 342    Plan:  Change regimen to 1000 mg every 12 hours. Predicted     Thank you for the consult.      Emilie Eaton PharmD.  2023  9:02 AM
Hand soak and wound care provided per Dr. Tami Garcia orders.
Hospitalist Progress Note      Name:  Rasheeda Diamond /Age/Sex: 1955  (78 y.o. female)   MRN & CSN:  [id number] & [id number] Admission Date/Time: 2023 12:26 PM   Location:  [id number]/4277-01 PCP: La Jackson MD         Hospital Day: 2    Assessment and Plan:   Rasheeda Diamond is a 78 y.o.  female with past medical history of hypertension, hyperlipidemia, diabetes mellitus type 2 and history of CVA was admitted on 2023 for evaluation of worsening wound/erythema to the left thenar eminence secondary to trauma about a week ago. Seen by orthopedics. Assessment    Left hand wound/cellulitis  Hypertension  Diabetes mellitus type 2  Hyperlipidemia  History of CVA    Plan    Continue empiric IV ceftriaxone/vancomycin  Follow-up culture data  Orthopedic surgery evaluation appreciated. Status post I&D left index finger. Holding aspirin  Continue amlodipine/lisinopril/Lipitor  Continue Lantus/insulin sliding scale  Continue daily PPI  Continue daily Ditropan XL    Diet Diet NPO Exceptions are: Ice Chips, Sips of Water with Meds   DVT Prophylaxis [x] Lovenox-on hold, []  Heparin, [] SCDs, [] Ambulation   GI Prophylaxis [x] PPI,  [] H2 Blocker,  [] Carafate,  [] Diet/Tube Feeds   Code Status Full Code   Disposition Patient requires continued admission due to IV antibiotics/awaiting I&D   MDM [] Low, [] Moderate,[x]  High  Patient's risk as above      History of Present Illness:     Chief Complaint: Abrasion of left index finger with infection    Patient seen and examined at bedside after I&D. Denies any significant pain at this time. Objective: Intake/Output Summary (Last 24 hours) at 2023 1046  Last data filed at 2023 0516  Gross per 24 hour   Intake 2497.08 ml   Output --   Net 2497.08 ml      Vitals:   Vitals:    23 0755   BP:    Pulse:    Resp:    Temp:    SpO2: 98%     Physical Exam:   GEN Awake female, in no apparent distress. Appears given age.   RESP bilateral air
Left hand wound care performed per order. Pt tolerated well.
Patient awake, pleasant, no c/o pain. Report called to [facility] on the 4th floor.
Patient went for an I and D today, where they initially tried to do a nerve block which was unsuccessful and ended up providing light sedation with propofol. Patient BS dropping in surgery to 60's but since being back on the floor they ran 's. Her most recent BS @ 1957 was 115 she is ordered 50 units of Lantus, sent message via perfect serve to Luke Burk NP. Per order will recheck BS at midnight and report back to see if she would like to add any adjustments.      Electronically signed by Paramjit Gamboa RN on 6/5/2023 at 10:40 PM
Per orders, soaked patients L hand in Hibiclens solution for 20 minutes. Patient tolerated well. Redressed and elevated hand.  Will continue to monitor       Electronically signed by Romana Bane, RN on 6/6/2023 at 6:32 AM
Per orders, soaked patients L hand in Hibiclens solution for 20 minutes. Patient tolerated well. Redressed and elevated hand.  Will continue to monitor    Electronically signed by Nadine Grace RN on 6/5/2023 at 10:42 PM
Pharmacy Medication Reconciliation Note     List of medications patient is currently taking is complete. Source of information:   1. Conversation with pt's sister that helps manage her meds  2. Fill hx     Allergies   Allergen Reactions    Ampicillin     Penicillins        Notes regarding home medications:   1. Removed KCl and Lasix. No longer takes.   2.  Confirmed Lantus is 40 units at [facility]   6/5/2023  10:10 AM
Physician Progress Note      PATIENT:               Renetta Gautam  CSN #:                  [id number]  :                       1955  ADMIT DATE:       2023 12:26 PM  [address] [place] DATE:  RESPONDING  PROVIDER #:        Bobby Talley MD          QUERY TEXT:    Dear Dr. Ana Kaur,  Pt admitted with Left hand cellulitis. Pt noted to have WBC 14.9, RR 20-23 . If possible, please document in the progress notes and discharge summary if   you are evaluating and /or treating any of the following: The medical record reflects the following:  Risk Factors: DM HTN  Clinical Indicators: H&P  documented \"Foreign body of finger of left hand   with infection. Abrasion of hand with infection, left, initial encounter. Continue patient on vancomycin and ceftriaxone for hand cellulitis and hand   wound infection\"  WBC 14.9, RR 20-23  Treatment: IV vancomycin and IV ceftriaxone, IVFs, Ortho consult, I and D  Thank you,  Mery Chiu RN, [email or web address]  Options provided:  -- Sepsis, present on admission  -- Left hand cellulitis without Sepsis  -- Other - I will add my own diagnosis  -- Disagree - Not applicable / Not valid  -- Disagree - Clinically unable to determine / Unknown  -- Refer to Clinical Documentation Reviewer    PROVIDER RESPONSE TEXT:    This patient has sepsis which was present on admission. Query created by:  [address] [place] on 2023 7:37 AM      Electronically signed by:  Bobby Talley MD 2023 1:49 PM
Pt Magnesium 1.6. This RN infusing Magnesium per PRN replacement protocol. Bed is in with eyes closed. Bed alarm is on, bedside and call light within reach
Pt's hand soaked and wound care per order. Pt tolerated well. Pt given Potassium per MAR replacement protocol. Bed alarm on,call light and bedside table within reach
Sister Deidre luna
Sister, Para Mitch called RN with concerns about patient receiving general anesthesia for Left Hand procedure. She states she does not want patient to have general anesthesia. Evette Winchester states she will be here prior to surgery to speak with physicians. RN called down to Pre-op at 8:22 am to report Rola Spence concerns. RN received call back from MD at 8:38 am who states he will speak with Evette Winchester about her concerns.
Spoke with Patients POA Hillman Simmonds on the phone. Updated her on surgical plan for today. Hillman Simmonds states she will be coming to the hospital to be with patient during procedure.
Surgery here to transport patient and Sister to Pre-Op. RN notified them of blood glucose 71 at 0751 then 80 at 0811.
Telephone report given to Fariba Peter in [address] who states they will be coming to get the patient around 10 am, with plans of OR at 11 am. RN called sister Gregg Alcantara to notify of surgery time.
This RN called pt sister, Mohitzoë Spears, from pt room phone per sister request. Pt agreeable.  Bed alarm on,call light and bedside table within reach
This RN discussed discharge instructions with pt, sister and uncle. All questions answered. IV removed w/o complication. Dressing applied. Pt discharged via wheelchair with all of her belongings and meds to beds.
This RN is replacing Magnesium per IV protocol. Phosphate requested from pharmacy to replace per IV replacement protocol. Pt hand is soaking per wound care order. Pt tolerating well. Call light within reach
This RN updated pt's sister,Asif joya, on pt POC. All questions answered.
[facility] Orthopedic Surgery   Progress Note      S/P :  SUBJECTIVE  In bed. Alert and oriented. Watching TV. Pain is not  described in left hand at this time     OBJECTIVE              Physical                      VITALS:  /67   Pulse 73   Temp 98.7 °F (37.1 °C) (Oral)   Resp 18   Wt 188 lb 15 oz (85.7 kg)   SpO2 94%   BMI 33.47 kg/m²                     MUSCULOSKELETAL:  Left wrist with intact flex/ext and hand grasp and extension and thumbs up Left hand warm and pink No notable swelling about the dressing.  Noted soaks and dressing change done by staff this AM                   NEUROLOGIC:                                  Sensory:  Touch:  Left Upper Extremity:  normal                                                 Surgical wound appears clean and dry with gauze roll over dressing left  hand    Data       CBC:   Lab Results   Component Value Date/Time    WBC 9.9 06/06/2023 07:31 AM    RBC 3.91 06/06/2023 07:31 AM    HGB 11.3 06/06/2023 07:31 AM    HCT 33.5 06/06/2023 07:31 AM    MCV 85.7 06/06/2023 07:31 AM    MCH 29.0 06/06/2023 07:31 AM    MCHC 33.8 06/06/2023 07:31 AM    RDW 12.4 06/06/2023 07:31 AM     06/06/2023 07:31 AM    MPV 8.8 06/06/2023 07:31 AM        WBC:    Lab Results   Component Value Date/Time    WBC 9.9 06/06/2023 07:31 AM        Hemoglobin/Hematocrit:    Lab Results   Component Value Date/Time    HGB 11.3 06/06/2023 07:31 AM    HCT 33.5 06/06/2023 07:31 AM        PT/INR:  No results found for: PROTIME, INR           Current Inpatient Medications             Current Facility-Administered Medications: vancomycin 1000 mg IVPB in 250 mL NS addavial, 1,000 mg, IntraVENous, Q12H  insulin glargine (LANTUS) injection vial 22 Units, 22 Units, SubCUTAneous, Nightly  insulin lispro (HUMALOG) injection vial 0-4 Units, 0-4 Units, SubCUTAneous, TID WC  insulin lispro (HUMALOG) injection vial 0-4 Units, 0-4 Units, SubCUTAneous, Nightly  sodium phosphate 10 mmol in sodium chloride 0.9 % 250 mL
overnight events noted. Objective: Intake/Output Summary (Last 24 hours) at 6/7/2023 0803  Last data filed at 6/7/2023 0138  Gross per 24 hour   Intake 660 ml   Output 900 ml   Net -240 ml        Vitals:   Vitals:    06/07/23 0731   BP: 139/77   Pulse: 67   Resp: 16   Temp: 98.5 °F (36.9 °C)   SpO2: 96%     Physical Exam:   GEN Awake female, in no apparent distress. Appears given age. RESP bilateral air entry fair. CARDIO/VASC S1/S2 auscultated. Regular rate. No peripheral edema. GI Abdomen is soft without significant tenderness  MSK left hand-dressing +, not removed. ROM intact  NEURO no gross focal deficit noted at this time.     Medications:   Medications:    vancomycin  1,000 mg IntraVENous Q12H    insulin glargine  22 Units SubCUTAneous Nightly    insulin lispro  0-4 Units SubCUTAneous TID WC    insulin lispro  0-4 Units SubCUTAneous Nightly    calcium elemental  500 mg Oral Daily    therapeutic multivitamin-minerals  1 tablet Oral Daily    atorvastatin  20 mg Oral Daily    oxybutynin  10 mg Oral Nightly    amLODIPine  5 mg Oral Daily    pantoprazole  40 mg Oral QAM AC    sodium chloride flush  5-40 mL IntraVENous 2 times per day    aspirin  81 mg Oral Daily    enoxaparin  40 mg SubCUTAneous Daily    cefTRIAXone (ROCEPHIN) IV  2,000 mg IntraVENous Q24H    lisinopril  20 mg Oral BID    vancomycin (VANCOCIN) intermittent dosing (placeholder)   Other RX Placeholder      Infusions:    sodium chloride      dextrose       PRN Meds: sodium phosphate IVPB, 10 mmol, PRN   Or  sodium phosphate IVPB, 15 mmol, PRN   Or  sodium phosphate IVPB, 20 mmol, PRN  potassium chloride, 40 mEq, PRN   Or  potassium alternative oral replacement, 40 mEq, PRN   Or  potassium chloride, 10 mEq, PRN  magnesium sulfate, 2,000 mg, PRN  sodium chloride flush, 5-40 mL, PRN  sodium chloride, , PRN  acetaminophen, 650 mg, Q6H PRN   Or  acetaminophen, 650 mg, Q6H PRN  polyethylene glycol, 17 g, Daily PRN  ondansetron, 4 mg, Q6H
9:03 AM

## 2023-06-08 NOTE — CARE COORDINATION
information. Plan for next call:  check on understanding of insulin pen and starting ASA.     Parker Padilla RN BSN  Care Transition Nurse  [phone]

## 2023-06-09 ENCOUNTER — CARE COORDINATION (OUTPATIENT)
Dept: CASE MANAGEMENT | Age: 68
End: 2023-06-09

## 2023-06-09 NOTE — CARE COORDINATION
[address] Care Transitions Follow Up Call    Patient Current Location: [address]  Transition Nurse contacted the family by telephone to follow up after admission. Verified name and  with family as identifiers. Patient: Elsa Vora  Patient : 1955   MRN: [id number]  Reason for Admission: Hand wound  Discharge Date: 23 RARS: Readmission Risk Score: 11.8      Spoke with sister Sarah Laird who states she was not able to  the aspirin or get instructions on the pen. States she will be going today. Heath's BS yesterday was 155 and this is around normal range for [facility]. States she was able to perform hand soaks yesterday - no questions or concerns. Does note the skin to be peeling a little bit. Pt to see her PCP Monday and surgeon Thursday. Will call with questions or concerns. Follow Up  Future Appointments   Date Time Provider hospitals   2023 10:45 AM Rut Robertson MD [facility]   6/15/2023 10:30 AM MD Aj Maurer [facility]   8/15/2023 10:30 AM Rut Robertson MD [facility] 23 - DYD     Non-[facility] follow up appointment(s): n/a    Care Transition Nurse reviewed red flags with family and discussed any barriers to care and/or understanding of plan of care after discharge. Discussed appropriate site of care based on symptoms and resources available to patient including: PCP  Specialist. The family agrees to contact the PCP office for questions related to their healthcare. Care Transition Nurse provided contact information for future needs. Plan for follow-up call in 3-5 days based on severity of symptoms and risk factors. Plan for next call: symptom management-.     LILY Elliott, RN   Care Transition Nurse  Mobile: [phone]

## 2023-06-10 LAB
BACTERIA SPEC AEROBE CULT: NORMAL
BACTERIA SPEC ANAEROBE CULT: NORMAL
GRAM STN SPEC: NORMAL

## 2023-06-22 ENCOUNTER — OFFICE VISIT (OUTPATIENT)
Dept: INTERNAL MEDICINE CLINIC | Age: 68
End: 2023-06-22
Payer: MEDICARE

## 2023-06-22 VITALS
SYSTOLIC BLOOD PRESSURE: 126 MMHG | BODY MASS INDEX: 33.13 KG/M2 | DIASTOLIC BLOOD PRESSURE: 72 MMHG | HEART RATE: 96 BPM | TEMPERATURE: 98.7 F | WEIGHT: 187 LBS | OXYGEN SATURATION: 98 %

## 2023-06-22 DIAGNOSIS — E83.42 HYPOMAGNESEMIA: ICD-10-CM

## 2023-06-22 DIAGNOSIS — E11.8 TYPE 2 DIABETES MELLITUS WITH COMPLICATION, WITH LONG-TERM CURRENT USE OF INSULIN (HCC): ICD-10-CM

## 2023-06-22 DIAGNOSIS — J30.9 ALLERGIC RHINITIS, UNSPECIFIED SEASONALITY, UNSPECIFIED TRIGGER: ICD-10-CM

## 2023-06-22 DIAGNOSIS — I10 ESSENTIAL HYPERTENSION: Primary | ICD-10-CM

## 2023-06-22 DIAGNOSIS — Z79.4 TYPE 2 DIABETES MELLITUS WITH COMPLICATION, WITH LONG-TERM CURRENT USE OF INSULIN (HCC): ICD-10-CM

## 2023-06-22 LAB — MAGNESIUM SERPL-MCNC: 1.9 MG/DL (ref 1.8–2.4)

## 2023-06-22 PROCEDURE — 2022F DILAT RTA XM EVC RTNOPTHY: CPT | Performed by: INTERNAL MEDICINE

## 2023-06-22 PROCEDURE — G8417 CALC BMI ABV UP PARAM F/U: HCPCS | Performed by: INTERNAL MEDICINE

## 2023-06-22 PROCEDURE — G8400 PT W/DXA NO RESULTS DOC: HCPCS | Performed by: INTERNAL MEDICINE

## 2023-06-22 PROCEDURE — 3017F COLORECTAL CA SCREEN DOC REV: CPT | Performed by: INTERNAL MEDICINE

## 2023-06-22 PROCEDURE — 3078F DIAST BP <80 MM HG: CPT | Performed by: INTERNAL MEDICINE

## 2023-06-22 PROCEDURE — 3044F HG A1C LEVEL LT 7.0%: CPT | Performed by: INTERNAL MEDICINE

## 2023-06-22 PROCEDURE — 36415 COLL VENOUS BLD VENIPUNCTURE: CPT | Performed by: INTERNAL MEDICINE

## 2023-06-22 PROCEDURE — 3074F SYST BP LT 130 MM HG: CPT | Performed by: INTERNAL MEDICINE

## 2023-06-22 PROCEDURE — 1111F DSCHRG MED/CURRENT MED MERGE: CPT | Performed by: INTERNAL MEDICINE

## 2023-06-22 PROCEDURE — 1123F ACP DISCUSS/DSCN MKR DOCD: CPT | Performed by: INTERNAL MEDICINE

## 2023-06-22 PROCEDURE — G8427 DOCREV CUR MEDS BY ELIG CLIN: HCPCS | Performed by: INTERNAL MEDICINE

## 2023-06-22 PROCEDURE — 99214 OFFICE O/P EST MOD 30 MIN: CPT | Performed by: INTERNAL MEDICINE

## 2023-06-22 PROCEDURE — 1090F PRES/ABSN URINE INCON ASSESS: CPT | Performed by: INTERNAL MEDICINE

## 2023-06-22 PROCEDURE — 1036F TOBACCO NON-USER: CPT | Performed by: INTERNAL MEDICINE

## 2023-06-23 ENCOUNTER — CARE COORDINATION (OUTPATIENT)
Dept: CASE MANAGEMENT | Age: 68
End: 2023-06-23

## 2023-06-23 NOTE — CARE COORDINATION
[facility] Care Transitions Follow Up Call    Patient Current Location:  [facility] [address] [place] [address]    [address] contacted the family by telephone to follow up after admission on 2023. Verified name and  with family as identifiers. Patient: Nneka Nurse  Patient : 1955   MRN: [id number]  Reason for Admission: hand infection   Discharge Date: 23 RARS: Readmission Risk Score: 11.8      Needs to be reviewed by the provider   Additional needs identified to be addressed with provider: No  none             Method of communication with provider: none. Patients sister Roberta Mcbride reports. Patient is doing ok. She was at the doctor yesterday and he is going to gradually change patient from insulin to pill. Appetite and fluid intake is good. No urinary or bowel problems. She hasn't taken FBS today. Last night it was 147. Her finger is much better. She is to keep it clean by using anti bacterial soap. No new issues or concerns. Will continue to follow. Addressed changes since last contact:  medications-insulin  Discussed follow-up appointments. If no appointment was previously scheduled, appointment scheduling offered: No.   Is follow up appointment scheduled within 7 days of discharge? Yes. Follow Up  Future Appointments   Date Time Provider Yazan Willoughby   2023 10:45 AM Michael Alfonso MD [address]     [facility] follow up appointment(s):     LPN Care Coordinator reviewed medical action plan and red flags with family and discussed any barriers to care and/or understanding of plan of care after discharge. Discussed appropriate site of care based on symptoms and resources available to patient including: PCP  Specialist  Urgent care clinics  When to call 911. The family agrees to contact the PCP office for questions related to their healthcare. Advance Care Planning:   not on file.      Patients top risk factors for readmission:

## 2023-06-26 ASSESSMENT — ENCOUNTER SYMPTOMS
BLOOD IN STOOL: 0
SORE THROAT: 0
SHORTNESS OF BREATH: 0
ABDOMINAL PAIN: 0
NAUSEA: 0
VOMITING: 0
COUGH: 0

## 2023-06-30 ENCOUNTER — CARE COORDINATION (OUTPATIENT)
Dept: CASE MANAGEMENT | Age: 68
End: 2023-06-30

## 2023-07-02 DIAGNOSIS — K21.9 GASTROESOPHAGEAL REFLUX DISEASE, UNSPECIFIED WHETHER ESOPHAGITIS PRESENT: ICD-10-CM

## 2023-07-02 DIAGNOSIS — E78.2 MIXED HYPERLIPIDEMIA: ICD-10-CM

## 2023-07-03 RX ORDER — ATORVASTATIN CALCIUM 20 MG/1
TABLET, FILM COATED ORAL
Qty: 90 TABLET | Refills: 1 | Status: SHIPPED | OUTPATIENT
Start: 2023-07-03

## 2023-07-03 RX ORDER — ESOMEPRAZOLE MAGNESIUM 40 MG/1
CAPSULE, DELAYED RELEASE ORAL
Qty: 90 CAPSULE | Refills: 1 | Status: SHIPPED | OUTPATIENT
Start: 2023-07-03

## 2023-07-05 DIAGNOSIS — E11.8 TYPE 2 DIABETES MELLITUS WITH COMPLICATION, WITH LONG-TERM CURRENT USE OF INSULIN (HCC): ICD-10-CM

## 2023-07-05 DIAGNOSIS — Z79.4 TYPE 2 DIABETES MELLITUS WITH COMPLICATION, WITH LONG-TERM CURRENT USE OF INSULIN (HCC): ICD-10-CM

## 2023-07-05 RX ORDER — INSULIN GLARGINE 100 [IU]/ML
INJECTION, SOLUTION SUBCUTANEOUS
Qty: 20 ML | Refills: 1 | Status: SHIPPED | OUTPATIENT
Start: 2023-07-05

## 2023-07-05 NOTE — TELEPHONE ENCOUNTER
Future Appointments   Date Time Provider [address]   7/20/2023 10:45 AM Sid Louis MD [facility]     Last appt on 6.22.2023

## 2023-07-06 ENCOUNTER — CARE COORDINATION (OUTPATIENT)
Dept: CASE MANAGEMENT | Age: 68
End: 2023-07-06

## 2023-07-06 NOTE — CARE COORDINATION
[facility] Care Transitions Follow Up Call      Patient: Sulema Cogan  Patient : 1955   MRN: [id number]  Reason for Admission: hand infection   Discharge Date: 23 RARS: Readmission Risk Score: 11.8        Attempted to call patient for follow up transition call. Left HIPAA compliant voicemail message requesting a return call and stating this is the final call we will be making. If you have any health concerns or problems arise please call your PCP to schedule an appointment. No further outreach scheduled.         Follow Up  Future Appointments   Date Time Provider [address] [place] [address]   2023 10:45 AM Chapincito Means MD [address] [facility] [address] Transitions Subsequent and Final Call    Subsequent and Final Calls  Care Transitions Interventions  Other Interventions:             Derenda Apgar LPN  Care Coordinator  [phone]

## 2023-07-10 DIAGNOSIS — I10 ESSENTIAL HYPERTENSION: Chronic | ICD-10-CM

## 2023-07-10 RX ORDER — LISINOPRIL AND HYDROCHLOROTHIAZIDE 20; 12.5 MG/1; MG/1
TABLET ORAL
Qty: 90 TABLET | Refills: 0 | Status: SHIPPED | OUTPATIENT
Start: 2023-07-10

## 2023-07-11 ENCOUNTER — TELEPHONE (OUTPATIENT)
Dept: INTERNAL MEDICINE CLINIC | Age: 68
End: 2023-07-11

## 2023-07-11 NOTE — TELEPHONE ENCOUNTER
Pt's sister Nickolas Soto called. Pt was in the hospital a while back. She was prescribed Potassium but she is out of it now. Nickolasgerardo Leonardnoemí wants to know if Beau Olivares still needs to take Potassium. If so, she will need an Rx . [address] [place]      Call Nickolasgerardo Soto [phone]

## 2023-07-12 RX ORDER — POTASSIUM CHLORIDE 1.5 G/1.77G
20 POWDER, FOR SOLUTION ORAL DAILY
Qty: 30 PACKET | Refills: 0 | Status: SHIPPED | OUTPATIENT
Start: 2023-07-12 | End: 2023-08-11

## 2023-07-12 NOTE — TELEPHONE ENCOUNTER
Please advise patient to continue taking potassium supplement. I sent the prescription to [facility] .

## 2023-07-24 ENCOUNTER — OFFICE VISIT (OUTPATIENT)
Dept: INTERNAL MEDICINE CLINIC | Age: 68
End: 2023-07-24
Payer: MEDICARE

## 2023-07-24 VITALS
BODY MASS INDEX: 30.47 KG/M2 | WEIGHT: 172 LBS | DIASTOLIC BLOOD PRESSURE: 79 MMHG | HEART RATE: 80 BPM | TEMPERATURE: 98.8 F | SYSTOLIC BLOOD PRESSURE: 144 MMHG | OXYGEN SATURATION: 92 %

## 2023-07-24 DIAGNOSIS — J30.9 ALLERGIC RHINITIS, UNSPECIFIED SEASONALITY, UNSPECIFIED TRIGGER: ICD-10-CM

## 2023-07-24 DIAGNOSIS — E83.42 HYPOMAGNESEMIA: ICD-10-CM

## 2023-07-24 DIAGNOSIS — N39.41 URGE INCONTINENCE: ICD-10-CM

## 2023-07-24 DIAGNOSIS — E87.6 HYPOKALEMIA: ICD-10-CM

## 2023-07-24 DIAGNOSIS — K21.9 GASTROESOPHAGEAL REFLUX DISEASE WITHOUT ESOPHAGITIS: ICD-10-CM

## 2023-07-24 DIAGNOSIS — E11.8 TYPE 2 DIABETES MELLITUS WITH COMPLICATION, WITH LONG-TERM CURRENT USE OF INSULIN (HCC): ICD-10-CM

## 2023-07-24 DIAGNOSIS — I10 ESSENTIAL HYPERTENSION: Primary | ICD-10-CM

## 2023-07-24 DIAGNOSIS — Z79.4 TYPE 2 DIABETES MELLITUS WITH COMPLICATION, WITH LONG-TERM CURRENT USE OF INSULIN (HCC): ICD-10-CM

## 2023-07-24 PROCEDURE — 3017F COLORECTAL CA SCREEN DOC REV: CPT | Performed by: INTERNAL MEDICINE

## 2023-07-24 PROCEDURE — 1036F TOBACCO NON-USER: CPT | Performed by: INTERNAL MEDICINE

## 2023-07-24 PROCEDURE — 3078F DIAST BP <80 MM HG: CPT | Performed by: INTERNAL MEDICINE

## 2023-07-24 PROCEDURE — 3044F HG A1C LEVEL LT 7.0%: CPT | Performed by: INTERNAL MEDICINE

## 2023-07-24 PROCEDURE — G8400 PT W/DXA NO RESULTS DOC: HCPCS | Performed by: INTERNAL MEDICINE

## 2023-07-24 PROCEDURE — G8427 DOCREV CUR MEDS BY ELIG CLIN: HCPCS | Performed by: INTERNAL MEDICINE

## 2023-07-24 PROCEDURE — 3077F SYST BP >= 140 MM HG: CPT | Performed by: INTERNAL MEDICINE

## 2023-07-24 PROCEDURE — 99214 OFFICE O/P EST MOD 30 MIN: CPT | Performed by: INTERNAL MEDICINE

## 2023-07-24 PROCEDURE — 0509F URINE INCON PLAN DOCD: CPT | Performed by: INTERNAL MEDICINE

## 2023-07-24 PROCEDURE — 1123F ACP DISCUSS/DSCN MKR DOCD: CPT | Performed by: INTERNAL MEDICINE

## 2023-07-24 PROCEDURE — 2022F DILAT RTA XM EVC RTNOPTHY: CPT | Performed by: INTERNAL MEDICINE

## 2023-07-24 PROCEDURE — 1090F PRES/ABSN URINE INCON ASSESS: CPT | Performed by: INTERNAL MEDICINE

## 2023-07-24 PROCEDURE — G8417 CALC BMI ABV UP PARAM F/U: HCPCS | Performed by: INTERNAL MEDICINE

## 2023-07-24 RX ORDER — LISINOPRIL AND HYDROCHLOROTHIAZIDE 20; 12.5 MG/1; MG/1
1 TABLET ORAL 2 TIMES DAILY
Qty: 180 TABLET | Refills: 1 | Status: SHIPPED | OUTPATIENT
Start: 2023-07-24

## 2023-07-24 RX ORDER — POTASSIUM CHLORIDE 1.5 G/1.58G
20 POWDER, FOR SOLUTION ORAL DAILY
Qty: 30 PACKET | Refills: 3 | Status: SHIPPED | OUTPATIENT
Start: 2023-07-24 | End: 2023-11-21

## 2023-07-24 ASSESSMENT — ENCOUNTER SYMPTOMS
BLOOD IN STOOL: 0
COUGH: 0
SHORTNESS OF BREATH: 0
VOMITING: 0
SORE THROAT: 0
NAUSEA: 0
ABDOMINAL PAIN: 0

## 2023-07-24 NOTE — PATIENT INSTRUCTIONS
Increase metformin dose to 500 mg twice daily and stop Insuline  Check Blood sugar twice daily  Can increase metformin to 3 times daily if home blood sugars are running high.

## 2023-07-24 NOTE — PROGRESS NOTES
Zeus Spears (:  1955) is a 80 y.o. female, Established patient, here for evaluation of the following chief complaint(s):  Diabetes and Hypertension         ASSESSMENT/PLAN:  1. Essential hypertension  - Stable   - Continue current dose of lisinopril hydrochlorothiazide  -     lisinopril-hydroCHLOROthiazide (PRINZIDE;ZESTORETIC) 20-12.5 MG per tablet; Take 1 tablet by mouth 2 times daily, Disp-180 tablet, R-1Normal    2. Hypomagnesemia  - Stable   Continue current dose of magnesium oxide.  -     Magnesium; Future    3. Type 2 diabetes mellitus with complication, with long-term current use of insulin (HCC)  - Stable   Increasing the dose of metformin to 500 mg twice daily. Advised patient and her sister to stop taking insulin. -     metFORMIN (GLUCOPHAGE) 500 MG tablet; Take 1 tablet by mouth 2 times daily (with meals), Disp-120 tablet, R-3Normal  -     Hemoglobin A1C; Future      4. Allergic rhinitis, unspecified seasonality, unspecified trigger  - Stable   - Continue current dose of Flonase nasal spray    5. Hypokalemia  - Stable   - Continue current dose of potassium chloride  -     potassium chloride (KLOR-CON) 20 MEQ packet; Take 20 mEq by mouth daily, Disp-30 packet, R-3Normal  -     Basic Metabolic Panel; Future    6. Urge incontinence  - Stable   - Continue current dose of oxybutynin    7. Gastroesophageal reflux disease without esophagitis  - Stable   - Continue current dose of Nexium    Return in about 3 months (around 10/24/2023) for Diabetes. Subjective   SUBJECTIVE/OBJECTIVE:  Diabetes  She presents for her follow-up diabetic visit. She has type 2 diabetes mellitus. Her disease course has been stable. Pertinent negatives for hypoglycemia include no dizziness. Pertinent negatives for diabetes include no chest pain, no fatigue and no weakness. Current diabetic treatment includes insulin injections. She is compliant with treatment all of the time. She is following a diabetic diet.  An ACE

## 2023-08-08 ENCOUNTER — TELEPHONE (OUTPATIENT)
Dept: INTERNAL MEDICINE CLINIC | Age: 68
End: 2023-08-08

## 2023-08-08 NOTE — TELEPHONE ENCOUNTER
Should go back to previous dose of metformin, stop the insulin as Dr Khoi Payne advised.    Message is unclear - please call pt - If she is feeling ill she needs to schedule OV

## 2023-08-08 NOTE — TELEPHONE ENCOUNTER
The pills are not agreeing with her the metformin, increased the amount of pills. And stopped the insulin. She is tired and don't feel bad all over and stomach is upset.       Please call sister back [phone]

## 2023-08-10 ENCOUNTER — NURSE ONLY (OUTPATIENT)
Dept: INTERNAL MEDICINE CLINIC | Age: 68
End: 2023-08-10
Payer: MEDICARE

## 2023-08-10 DIAGNOSIS — Z79.4 TYPE 2 DIABETES MELLITUS WITH COMPLICATION, WITH LONG-TERM CURRENT USE OF INSULIN (HCC): ICD-10-CM

## 2023-08-10 DIAGNOSIS — E87.6 HYPOKALEMIA: ICD-10-CM

## 2023-08-10 DIAGNOSIS — E11.8 TYPE 2 DIABETES MELLITUS WITH COMPLICATION, WITH LONG-TERM CURRENT USE OF INSULIN (HCC): ICD-10-CM

## 2023-08-10 DIAGNOSIS — E83.42 HYPOMAGNESEMIA: ICD-10-CM

## 2023-08-10 LAB
ANION GAP SERPL CALCULATED.3IONS-SCNC: 18 MMOL/L (ref 3–16)
BUN SERPL-MCNC: 21 MG/DL (ref 7–20)
CALCIUM SERPL-MCNC: 10.1 MG/DL (ref 8.3–10.6)
CHLORIDE SERPL-SCNC: 98 MMOL/L (ref 99–110)
CO2 SERPL-SCNC: 22 MMOL/L (ref 21–32)
CREAT SERPL-MCNC: 1.3 MG/DL (ref 0.6–1.2)
GFR SERPLBLD CREATININE-BSD FMLA CKD-EPI: 45 ML/MIN/{1.73_M2}
GLUCOSE SERPL-MCNC: 233 MG/DL (ref 70–99)
MAGNESIUM SERPL-MCNC: 1.7 MG/DL (ref 1.8–2.4)
POTASSIUM SERPL-SCNC: 4 MMOL/L (ref 3.5–5.1)
SODIUM SERPL-SCNC: 138 MMOL/L (ref 136–145)

## 2023-08-10 PROCEDURE — 36415 COLL VENOUS BLD VENIPUNCTURE: CPT | Performed by: INTERNAL MEDICINE

## 2023-08-10 PROCEDURE — 99999 PR OFFICE/OUTPT VISIT,PROCEDURE ONLY: CPT | Performed by: INTERNAL MEDICINE

## 2023-08-11 DIAGNOSIS — N17.9 AKI (ACUTE KIDNEY INJURY) (HCC): Primary | ICD-10-CM

## 2023-08-11 DIAGNOSIS — E83.42 HYPOMAGNESEMIA: ICD-10-CM

## 2023-08-11 LAB
EST. AVERAGE GLUCOSE BLD GHB EST-MCNC: 151.3 MG/DL
HBA1C MFR BLD: 6.9 %

## 2023-08-18 DIAGNOSIS — E11.8 TYPE 2 DIABETES MELLITUS WITH COMPLICATION, WITH LONG-TERM CURRENT USE OF INSULIN (HCC): ICD-10-CM

## 2023-08-18 DIAGNOSIS — Z79.4 TYPE 2 DIABETES MELLITUS WITH COMPLICATION, WITH LONG-TERM CURRENT USE OF INSULIN (HCC): ICD-10-CM

## 2023-09-19 ENCOUNTER — COMMUNITY OUTREACH (OUTPATIENT)
Dept: INTERNAL MEDICINE CLINIC | Age: 68
End: 2023-09-19

## 2023-10-25 ENCOUNTER — OFFICE VISIT (OUTPATIENT)
Dept: INTERNAL MEDICINE CLINIC | Age: 68
End: 2023-10-25
Payer: MEDICARE

## 2023-10-25 VITALS
HEART RATE: 90 BPM | SYSTOLIC BLOOD PRESSURE: 130 MMHG | TEMPERATURE: 98.7 F | DIASTOLIC BLOOD PRESSURE: 87 MMHG | BODY MASS INDEX: 32.1 KG/M2 | OXYGEN SATURATION: 98 % | WEIGHT: 181.2 LBS

## 2023-10-25 DIAGNOSIS — Z12.31 ENCOUNTER FOR SCREENING MAMMOGRAM FOR MALIGNANT NEOPLASM OF BREAST: ICD-10-CM

## 2023-10-25 DIAGNOSIS — N39.41 URGE INCONTINENCE: ICD-10-CM

## 2023-10-25 DIAGNOSIS — J30.9 ALLERGIC RHINITIS, UNSPECIFIED SEASONALITY, UNSPECIFIED TRIGGER: ICD-10-CM

## 2023-10-25 DIAGNOSIS — K21.9 GASTROESOPHAGEAL REFLUX DISEASE WITHOUT ESOPHAGITIS: ICD-10-CM

## 2023-10-25 DIAGNOSIS — E11.8 TYPE 2 DIABETES MELLITUS WITH COMPLICATION, WITH LONG-TERM CURRENT USE OF INSULIN (HCC): ICD-10-CM

## 2023-10-25 DIAGNOSIS — N18.31 STAGE 3A CHRONIC KIDNEY DISEASE (HCC): ICD-10-CM

## 2023-10-25 DIAGNOSIS — Z79.4 TYPE 2 DIABETES MELLITUS WITH COMPLICATION, WITH LONG-TERM CURRENT USE OF INSULIN (HCC): ICD-10-CM

## 2023-10-25 DIAGNOSIS — E87.6 HYPOKALEMIA: ICD-10-CM

## 2023-10-25 DIAGNOSIS — I10 ESSENTIAL HYPERTENSION: Primary | ICD-10-CM

## 2023-10-25 DIAGNOSIS — E83.42 HYPOMAGNESEMIA: ICD-10-CM

## 2023-10-25 PROBLEM — N18.30 CHRONIC RENAL DISEASE, STAGE III (HCC): Status: ACTIVE | Noted: 2023-10-25

## 2023-10-25 LAB
ANION GAP SERPL CALCULATED.3IONS-SCNC: 18 MMOL/L (ref 3–16)
BUN SERPL-MCNC: 30 MG/DL (ref 7–20)
CALCIUM SERPL-MCNC: 9.6 MG/DL (ref 8.3–10.6)
CHLORIDE SERPL-SCNC: 100 MMOL/L (ref 99–110)
CO2 SERPL-SCNC: 22 MMOL/L (ref 21–32)
CREAT SERPL-MCNC: 1.1 MG/DL (ref 0.6–1.2)
GFR SERPLBLD CREATININE-BSD FMLA CKD-EPI: 55 ML/MIN/{1.73_M2}
GLUCOSE SERPL-MCNC: 252 MG/DL (ref 70–99)
MAGNESIUM SERPL-MCNC: 1.6 MG/DL (ref 1.8–2.4)
POTASSIUM SERPL-SCNC: 4.5 MMOL/L (ref 3.5–5.1)
SODIUM SERPL-SCNC: 140 MMOL/L (ref 136–145)

## 2023-10-25 PROCEDURE — 36415 COLL VENOUS BLD VENIPUNCTURE: CPT | Performed by: INTERNAL MEDICINE

## 2023-10-25 PROCEDURE — 3017F COLORECTAL CA SCREEN DOC REV: CPT | Performed by: INTERNAL MEDICINE

## 2023-10-25 PROCEDURE — G8427 DOCREV CUR MEDS BY ELIG CLIN: HCPCS | Performed by: INTERNAL MEDICINE

## 2023-10-25 PROCEDURE — 1123F ACP DISCUSS/DSCN MKR DOCD: CPT | Performed by: INTERNAL MEDICINE

## 2023-10-25 PROCEDURE — G8417 CALC BMI ABV UP PARAM F/U: HCPCS | Performed by: INTERNAL MEDICINE

## 2023-10-25 PROCEDURE — 2022F DILAT RTA XM EVC RTNOPTHY: CPT | Performed by: INTERNAL MEDICINE

## 2023-10-25 PROCEDURE — 3074F SYST BP LT 130 MM HG: CPT | Performed by: INTERNAL MEDICINE

## 2023-10-25 PROCEDURE — 1036F TOBACCO NON-USER: CPT | Performed by: INTERNAL MEDICINE

## 2023-10-25 PROCEDURE — 1090F PRES/ABSN URINE INCON ASSESS: CPT | Performed by: INTERNAL MEDICINE

## 2023-10-25 PROCEDURE — G8400 PT W/DXA NO RESULTS DOC: HCPCS | Performed by: INTERNAL MEDICINE

## 2023-10-25 PROCEDURE — G8484 FLU IMMUNIZE NO ADMIN: HCPCS | Performed by: INTERNAL MEDICINE

## 2023-10-25 PROCEDURE — 3078F DIAST BP <80 MM HG: CPT | Performed by: INTERNAL MEDICINE

## 2023-10-25 PROCEDURE — 99215 OFFICE O/P EST HI 40 MIN: CPT | Performed by: INTERNAL MEDICINE

## 2023-10-25 PROCEDURE — 0509F URINE INCON PLAN DOCD: CPT | Performed by: INTERNAL MEDICINE

## 2023-10-25 PROCEDURE — 3044F HG A1C LEVEL LT 7.0%: CPT | Performed by: INTERNAL MEDICINE

## 2023-10-25 ASSESSMENT — ENCOUNTER SYMPTOMS
ABDOMINAL PAIN: 0
SHORTNESS OF BREATH: 0
BLOOD IN STOOL: 0
COUGH: 0
SORE THROAT: 0
VOMITING: 0
NAUSEA: 0

## 2023-10-25 NOTE — PROGRESS NOTES
Lindsay Blanc (:  1955) is a 80 y.o. female, Established patient, here for evaluation of the following chief complaint(s):  Hypertension         ASSESSMENT/PLAN:  1. Essential hypertension  - Stable   - Continue current dose of lisinopril hydrochlorothiazide  -     Basic Metabolic Panel  2. Hypomagnesemia  Uncontrolled  Advised patient to start taking magnesium oxide, new prescription sent to pharmacy.  -     Magnesium  3. Type 2 diabetes mellitus with complication, with long-term current use of insulin (HCC)  -Blood sugar is increasing after stopping insulin. - Continue current dose of metformin. Patient and her sister would like to wait for a month before restarting insulin  Recommend to follow diabetic diet with low carbohydrates and regular exercise as tolerated. 4. Allergic rhinitis, unspecified seasonality, unspecified trigger  - Stable   - Continue current dose of Flonase nasal spray  5. Hypokalemia  Stable, patient is not taking potassium supplements. -     Basic Metabolic Panel  6. Stage 3a chronic kidney disease (HCC)  -     Basic Metabolic Panel  - Stable   - Continue current dose of lisinopril. Avoid nephrotoxins including NSAIDs  7. Encounter for screening mammogram for malignant neoplasm of breast  -     DEANA DIGITAL SCREEN W OR WO CAD BILATERAL; Future  8. Urge incontinence  - Stable   - Continue current dose of oxybutynin  9. Gastroesophageal reflux disease without esophagitis  - Stable   - Continue current dose of Nexium      Return in about 3 weeks (around 11/15/2023). Subjective   SUBJECTIVE/OBJECTIVE:  Diabetes  She presents for her follow-up diabetic visit. She has type 2 diabetes mellitus. Her disease course has been stable. Pertinent negatives for hypoglycemia include no dizziness. Pertinent negatives for diabetes include no chest pain, no fatigue and no weakness. Current diabetic treatment includes oral agent (monotherapy). She is compliant with treatment all of the time.

## 2023-10-26 DIAGNOSIS — E83.42 HYPOMAGNESEMIA: ICD-10-CM

## 2023-10-26 RX ORDER — MAGNESIUM OXIDE 400 MG/1
400 TABLET ORAL DAILY
Qty: 30 TABLET | Refills: 3 | Status: SHIPPED | OUTPATIENT
Start: 2023-10-26

## 2023-11-20 ENCOUNTER — CLINICAL DOCUMENTATION (OUTPATIENT)
Dept: SPIRITUAL SERVICES | Age: 68
End: 2023-11-20

## 2023-11-20 ENCOUNTER — OFFICE VISIT (OUTPATIENT)
Dept: INTERNAL MEDICINE CLINIC | Age: 68
End: 2023-11-20
Payer: MEDICARE

## 2023-11-20 VITALS
DIASTOLIC BLOOD PRESSURE: 81 MMHG | HEART RATE: 93 BPM | SYSTOLIC BLOOD PRESSURE: 127 MMHG | BODY MASS INDEX: 30.93 KG/M2 | TEMPERATURE: 98.5 F | OXYGEN SATURATION: 98 % | WEIGHT: 174.6 LBS

## 2023-11-20 DIAGNOSIS — E78.2 MIXED HYPERLIPIDEMIA: ICD-10-CM

## 2023-11-20 DIAGNOSIS — Z00.00 MEDICARE ANNUAL WELLNESS VISIT, SUBSEQUENT: Primary | ICD-10-CM

## 2023-11-20 DIAGNOSIS — D64.9 ANEMIA, UNSPECIFIED TYPE: ICD-10-CM

## 2023-11-20 DIAGNOSIS — E11.22 TYPE 2 DIABETES MELLITUS WITH STAGE 3A CHRONIC KIDNEY DISEASE, WITHOUT LONG-TERM CURRENT USE OF INSULIN (HCC): ICD-10-CM

## 2023-11-20 DIAGNOSIS — E83.42 HYPOMAGNESEMIA: ICD-10-CM

## 2023-11-20 DIAGNOSIS — N18.31 TYPE 2 DIABETES MELLITUS WITH STAGE 3A CHRONIC KIDNEY DISEASE, WITHOUT LONG-TERM CURRENT USE OF INSULIN (HCC): ICD-10-CM

## 2023-11-20 DIAGNOSIS — Z71.89 COUNSELING FOR LIVING WILL: ICD-10-CM

## 2023-11-20 DIAGNOSIS — I10 ESSENTIAL HYPERTENSION: ICD-10-CM

## 2023-11-20 LAB
ANION GAP SERPL CALCULATED.3IONS-SCNC: 15 MMOL/L (ref 3–16)
BASOPHILS # BLD: 0.1 K/UL (ref 0–0.2)
BASOPHILS NFR BLD: 0.7 %
BUN SERPL-MCNC: 18 MG/DL (ref 7–20)
CALCIUM SERPL-MCNC: 10.1 MG/DL (ref 8.3–10.6)
CHLORIDE SERPL-SCNC: 99 MMOL/L (ref 99–110)
CHOLEST SERPL-MCNC: 143 MG/DL (ref 0–199)
CO2 SERPL-SCNC: 24 MMOL/L (ref 21–32)
CREAT SERPL-MCNC: 1 MG/DL (ref 0.6–1.2)
DEPRECATED RDW RBC AUTO: 13.3 % (ref 12.4–15.4)
EOSINOPHIL # BLD: 0 K/UL (ref 0–0.6)
EOSINOPHIL NFR BLD: 0.4 %
GFR SERPLBLD CREATININE-BSD FMLA CKD-EPI: >60 ML/MIN/{1.73_M2}
GLUCOSE SERPL-MCNC: 184 MG/DL (ref 70–99)
HCT VFR BLD AUTO: 37.3 % (ref 36–48)
HDLC SERPL-MCNC: 46 MG/DL (ref 40–60)
HGB BLD-MCNC: 12.4 G/DL (ref 12–16)
LDLC SERPL CALC-MCNC: 76 MG/DL
LYMPHOCYTES # BLD: 1.6 K/UL (ref 1–5.1)
LYMPHOCYTES NFR BLD: 12.9 %
MAGNESIUM SERPL-MCNC: 1.7 MG/DL (ref 1.8–2.4)
MCH RBC QN AUTO: 28.6 PG (ref 26–34)
MCHC RBC AUTO-ENTMCNC: 33.4 G/DL (ref 31–36)
MCV RBC AUTO: 85.9 FL (ref 80–100)
MONOCYTES # BLD: 0.5 K/UL (ref 0–1.3)
MONOCYTES NFR BLD: 3.8 %
NEUTROPHILS # BLD: 10.5 K/UL (ref 1.7–7.7)
NEUTROPHILS NFR BLD: 82.2 %
PLATELET # BLD AUTO: 285 K/UL (ref 135–450)
PMV BLD AUTO: 10 FL (ref 5–10.5)
POTASSIUM SERPL-SCNC: 4 MMOL/L (ref 3.5–5.1)
RBC # BLD AUTO: 4.34 M/UL (ref 4–5.2)
SODIUM SERPL-SCNC: 138 MMOL/L (ref 136–145)
TRIGL SERPL-MCNC: 103 MG/DL (ref 0–150)
VLDLC SERPL CALC-MCNC: 21 MG/DL
WBC # BLD AUTO: 12.7 K/UL (ref 4–11)

## 2023-11-20 PROCEDURE — 36415 COLL VENOUS BLD VENIPUNCTURE: CPT | Performed by: INTERNAL MEDICINE

## 2023-11-20 PROCEDURE — G8417 CALC BMI ABV UP PARAM F/U: HCPCS | Performed by: INTERNAL MEDICINE

## 2023-11-20 PROCEDURE — 1123F ACP DISCUSS/DSCN MKR DOCD: CPT | Performed by: INTERNAL MEDICINE

## 2023-11-20 PROCEDURE — 3017F COLORECTAL CA SCREEN DOC REV: CPT | Performed by: INTERNAL MEDICINE

## 2023-11-20 PROCEDURE — 2022F DILAT RTA XM EVC RTNOPTHY: CPT | Performed by: INTERNAL MEDICINE

## 2023-11-20 PROCEDURE — 99214 OFFICE O/P EST MOD 30 MIN: CPT | Performed by: INTERNAL MEDICINE

## 2023-11-20 PROCEDURE — 3044F HG A1C LEVEL LT 7.0%: CPT | Performed by: INTERNAL MEDICINE

## 2023-11-20 PROCEDURE — 1036F TOBACCO NON-USER: CPT | Performed by: INTERNAL MEDICINE

## 2023-11-20 PROCEDURE — G8400 PT W/DXA NO RESULTS DOC: HCPCS | Performed by: INTERNAL MEDICINE

## 2023-11-20 PROCEDURE — G8484 FLU IMMUNIZE NO ADMIN: HCPCS | Performed by: INTERNAL MEDICINE

## 2023-11-20 PROCEDURE — G0439 PPPS, SUBSEQ VISIT: HCPCS | Performed by: INTERNAL MEDICINE

## 2023-11-20 PROCEDURE — 3079F DIAST BP 80-89 MM HG: CPT | Performed by: INTERNAL MEDICINE

## 2023-11-20 PROCEDURE — 3074F SYST BP LT 130 MM HG: CPT | Performed by: INTERNAL MEDICINE

## 2023-11-20 PROCEDURE — G8427 DOCREV CUR MEDS BY ELIG CLIN: HCPCS | Performed by: INTERNAL MEDICINE

## 2023-11-20 PROCEDURE — 1090F PRES/ABSN URINE INCON ASSESS: CPT | Performed by: INTERNAL MEDICINE

## 2023-11-20 RX ORDER — DULAGLUTIDE 0.75 MG/.5ML
0.75 INJECTION, SOLUTION SUBCUTANEOUS WEEKLY
Qty: 1 ADJUSTABLE DOSE PRE-FILLED PEN SYRINGE | Refills: 1 | Status: SHIPPED | OUTPATIENT
Start: 2023-11-20

## 2023-11-20 ASSESSMENT — PATIENT HEALTH QUESTIONNAIRE - PHQ9
5. POOR APPETITE OR OVEREATING: 0
SUM OF ALL RESPONSES TO PHQ QUESTIONS 1-9: 2
2. FEELING DOWN, DEPRESSED OR HOPELESS: 1
7. TROUBLE CONCENTRATING ON THINGS, SUCH AS READING THE NEWSPAPER OR WATCHING TELEVISION: 0
1. LITTLE INTEREST OR PLEASURE IN DOING THINGS: 1
6. FEELING BAD ABOUT YOURSELF - OR THAT YOU ARE A FAILURE OR HAVE LET YOURSELF OR YOUR FAMILY DOWN: 0
SUM OF ALL RESPONSES TO PHQ QUESTIONS 1-9: 2
8. MOVING OR SPEAKING SO SLOWLY THAT OTHER PEOPLE COULD HAVE NOTICED. OR THE OPPOSITE, BEING SO FIGETY OR RESTLESS THAT YOU HAVE BEEN MOVING AROUND A LOT MORE THAN USUAL: 0
SUM OF ALL RESPONSES TO PHQ QUESTIONS 1-9: 2
9. THOUGHTS THAT YOU WOULD BE BETTER OFF DEAD, OR OF HURTING YOURSELF: 0
3. TROUBLE FALLING OR STAYING ASLEEP: 1
SUM OF ALL RESPONSES TO PHQ QUESTIONS 1-9: 2
SUM OF ALL RESPONSES TO PHQ QUESTIONS 1-9: 2
10. IF YOU CHECKED OFF ANY PROBLEMS, HOW DIFFICULT HAVE THESE PROBLEMS MADE IT FOR YOU TO DO YOUR WORK, TAKE CARE OF THINGS AT HOME, OR GET ALONG WITH OTHER PEOPLE: 1
SUM OF ALL RESPONSES TO PHQ9 QUESTIONS 1 & 2: 2
SUM OF ALL RESPONSES TO PHQ QUESTIONS 1-9: 2
4. FEELING TIRED OR HAVING LITTLE ENERGY: 1

## 2023-11-20 ASSESSMENT — ENCOUNTER SYMPTOMS
SORE THROAT: 0
NAUSEA: 0
BLOOD IN STOOL: 0
VOMITING: 0
SHORTNESS OF BREATH: 0
ABDOMINAL PAIN: 0
COUGH: 0

## 2023-11-20 ASSESSMENT — COLUMBIA-SUICIDE SEVERITY RATING SCALE - C-SSRS
7. DID THIS OCCUR IN THE LAST THREE MONTHS: NO
3. HAVE YOU BEEN THINKING ABOUT HOW YOU MIGHT KILL YOURSELF?: NO
4. HAVE YOU HAD THESE THOUGHTS AND HAD SOME INTENTION OF ACTING ON THEM?: NO
5. HAVE YOU STARTED TO WORK OUT OR WORKED OUT THE DETAILS OF HOW TO KILL YOURSELF? DO YOU INTEND TO CARRY OUT THIS PLAN?: NO

## 2023-11-20 NOTE — ACP (ADVANCE CARE PLANNING)
Advance Care Planning   Ambulatory ACP Specialist Patient Outreach    Date:  11/20/2023    ACP Specialist:  BECKY Wallis    Outreach call to patient in follow-up to ACP Specialist referral Gonzales Meadows MD    [x] PCP  [x] Provider   [] Ambulatory Care Management [] Other     For:                  [x] Advance Directive Assistance              [] Complete Portable DNR order              [] Complete POST/POLST/MOST              [] Code Status Discussion             [] Discuss Goals of Care             [] Early ACP Decision-Making              [] Other (Specify)    Date Referral Received:11/20/2023    Next Step:   [] ACP scheduled conversation  [x] Outreach again in one week               [] Email / Mail [address]  [] Email / Mail Advance Directive   [] Closing referral.  Routing closure to referring provider/staff and to ACP Specialist . [] Closure letter mailed to patient with invitation to contact ACP Specialist if / when ready. [] Other (Specify here):         [x] At this time, Healthcare Decision Maker Is:  Advance Care Planning   Healthcare Decision Maker:    Primary Decision Maker: Jarek Cameron  Brother/Sister - [phone]    Click here to complete Healthcare Decision Makers including selection of the Healthcare Decision Maker Relationship (ie \"Primary\"). [] Primary agent named in scanned advance directive. [x] Legal Next of Kin. [] Unable to determine legal decision maker at this time. Outreaches:       [x] 1st -  Date:  11/20/2023               Intervention:  [] Spoke with Patient   [x] Left Voice mail [] Email / Mail    [] "[employer]"  [] Other [phone] : Outcomes: This Coordinator attempted to reach the patient offering an ACP conversation, but the patient was not reached, and a voice message has been left at the listed phone number. The HCDM follows the NOK. A follow-up will be made within a week.             [] 2nd -

## 2023-11-20 NOTE — PROGRESS NOTES
Neurological:      Mental Status: She is alert and oriented to person, place, and time. Mental status is at baseline. Psychiatric:         Mood and Affect: Mood normal.         Behavior: Behavior normal.             Allergies   Allergen Reactions    Ampicillin     Penicillins      Prior to Visit Medications    Medication Sig Taking?  Authorizing Provider   Dulaglutide (TRULICITY) 2.47 KP/2.4HS SOPN Inject 0.75 mg into the skin once a week Yes Jodie Drake MD   magnesium oxide (MAG-OX) 400 MG tablet Take 1 tablet by mouth daily Yes Jodie Drake MD   metFORMIN (GLUCOPHAGE) 500 MG tablet TAKE 1 TABLET BY MOUTH DAILY WITH BREAKFAST Yes Dc Obrien MD   lisinopril-hydroCHLOROthiazide (PRINZIDE;ZESTORETIC) 20-12.5 MG per tablet Take 1 tablet by mouth 2 times daily Yes Jodie Drake MD   insulin glargine (LANTUS) 100 UNIT/ML injection vial Inject 40 Units into the skin nightly Yes Jodie Drake MD   esomeprazole ([place]) 40 MG delayed release capsule TAKE ONE CAPSULE BY MOUTH EVERY MORNING BEFORE BREAKFAST Yes Jodie Drake MD   atorvastatin (LIPITOR) 20 MG tablet TAKE ONE TABLET BY MOUTH DAILY NEED APPOINTMENT FOR REFILL Yes Jodie Drake MD   fluticasone (FLONASE) 50 MCG/ACT nasal spray 1 spray by Each Nostril route daily Yes Jodie Drake MD   aspirin 81 MG chewable tablet Take 1 tablet by mouth daily Yes Merline Redwood, MD   Insulin Syringe-Needle U-100 31G X 5/16\" 0.5 ML MISC 1 each by Does not apply route 3 times daily Yes Donell Teague MD   oxybutynin (DITROPAN-XL) 10 MG extended release tablet TAKE ONE TABLET BY MOUTH DAILY Yes Jodie Drake MD   calcium carbonate 600 MG TABS tablet Take 1 tablet by mouth daily Yes Cathy Davis MD   Multiple Vitamins-Minerals (THERAPEUTIC MULTIVITAMIN-MINERALS) tablet Take 1 tablet by mouth daily Yes Cathy Davis MD   Accu-Chek Softclix Lancets MISC USE TO TEST BLOOD

## 2023-11-21 ENCOUNTER — TELEPHONE (OUTPATIENT)
Dept: INTERNAL MEDICINE CLINIC | Age: 68
End: 2023-11-21

## 2023-11-21 DIAGNOSIS — E83.42 HYPOMAGNESEMIA: ICD-10-CM

## 2023-11-21 LAB
EST. AVERAGE GLUCOSE BLD GHB EST-MCNC: 165.7 MG/DL
HBA1C MFR BLD: 7.4 %

## 2023-11-21 RX ORDER — INSULIN GLARGINE 100 [IU]/ML
10 INJECTION, SOLUTION SUBCUTANEOUS NIGHTLY
Qty: 5 ADJUSTABLE DOSE PRE-FILLED PEN SYRINGE | Refills: 3 | Status: SHIPPED | OUTPATIENT
Start: 2023-11-21

## 2023-11-21 RX ORDER — MAGNESIUM OXIDE 400 MG/1
400 TABLET ORAL 2 TIMES DAILY
Qty: 60 TABLET | Refills: 3 | Status: SHIPPED | OUTPATIENT
Start: 2023-11-21 | End: 2024-03-20

## 2023-11-21 NOTE — TELEPHONE ENCOUNTER
Patient caregiver called back said that pt temp is 96.0 and she is urinating more frequent but has increased water intake.  Is asking for a call back

## 2023-11-21 NOTE — TELEPHONE ENCOUNTER
Spoke to Caregiver and explained all to her. Caregiver is very apprehensive about starting the Trulicity due to all of the side affects. Do you suggest anything else?

## 2023-12-01 DIAGNOSIS — E11.8 TYPE 2 DIABETES MELLITUS WITH COMPLICATION, WITH LONG-TERM CURRENT USE OF INSULIN (HCC): ICD-10-CM

## 2023-12-01 DIAGNOSIS — Z79.4 TYPE 2 DIABETES MELLITUS WITH COMPLICATION, WITH LONG-TERM CURRENT USE OF INSULIN (HCC): ICD-10-CM

## 2023-12-01 RX ORDER — SYRINGE-NEEDLE,INSULIN,0.5 ML 27GX1/2"
1 SYRINGE, EMPTY DISPOSABLE MISCELLANEOUS 3 TIMES DAILY
Qty: 100 EACH | Refills: 3 | Status: SHIPPED | OUTPATIENT
Start: 2023-12-01

## 2023-12-01 NOTE — TELEPHONE ENCOUNTER
Pt needs a refill for their injection pen needles    Please send to [facility] on [address]    Thank you

## 2023-12-04 ENCOUNTER — TELEPHONE (OUTPATIENT)
Dept: INTERNAL MEDICINE CLINIC | Age: 68
End: 2023-12-04

## 2023-12-04 DIAGNOSIS — Z79.4 TYPE 2 DIABETES MELLITUS WITH COMPLICATION, WITH LONG-TERM CURRENT USE OF INSULIN (HCC): Primary | ICD-10-CM

## 2023-12-04 DIAGNOSIS — E11.8 TYPE 2 DIABETES MELLITUS WITH COMPLICATION, WITH LONG-TERM CURRENT USE OF INSULIN (HCC): Primary | ICD-10-CM

## 2023-12-04 NOTE — TELEPHONE ENCOUNTER
Pts sister called, she does not need syringes, she needs pen needles, the little needles    Thank you

## 2023-12-24 DIAGNOSIS — N39.41 URGE INCONTINENCE: ICD-10-CM

## 2023-12-26 RX ORDER — OXYBUTYNIN CHLORIDE 10 MG/1
TABLET, EXTENDED RELEASE ORAL
Qty: 90 TABLET | Refills: 1 | Status: SHIPPED | OUTPATIENT
Start: 2023-12-26

## 2023-12-29 DIAGNOSIS — E78.2 MIXED HYPERLIPIDEMIA: ICD-10-CM

## 2023-12-29 RX ORDER — ATORVASTATIN CALCIUM 20 MG/1
TABLET, FILM COATED ORAL
Qty: 90 TABLET | Refills: 1 | Status: SHIPPED | OUTPATIENT
Start: 2023-12-29

## 2023-12-30 DIAGNOSIS — K21.9 GASTROESOPHAGEAL REFLUX DISEASE, UNSPECIFIED WHETHER ESOPHAGITIS PRESENT: ICD-10-CM

## 2024-01-02 RX ORDER — ESOMEPRAZOLE MAGNESIUM 40 MG/1
CAPSULE, DELAYED RELEASE ORAL
Qty: 90 CAPSULE | Refills: 1 | Status: SHIPPED | OUTPATIENT
Start: 2024-01-02

## 2024-01-11 ENCOUNTER — OFFICE VISIT (OUTPATIENT)
Dept: INTERNAL MEDICINE CLINIC | Age: 69
End: 2024-01-11
Payer: MEDICARE

## 2024-01-11 VITALS
WEIGHT: 176 LBS | DIASTOLIC BLOOD PRESSURE: 74 MMHG | OXYGEN SATURATION: 95 % | SYSTOLIC BLOOD PRESSURE: 124 MMHG | HEART RATE: 91 BPM | BODY MASS INDEX: 31.18 KG/M2 | HEIGHT: 63 IN

## 2024-01-11 DIAGNOSIS — G89.29 CHRONIC PAIN OF BOTH KNEES: ICD-10-CM

## 2024-01-11 DIAGNOSIS — I10 ESSENTIAL HYPERTENSION: ICD-10-CM

## 2024-01-11 DIAGNOSIS — N18.31 TYPE 2 DIABETES MELLITUS WITH STAGE 3A CHRONIC KIDNEY DISEASE, WITH LONG-TERM CURRENT USE OF INSULIN (HCC): Primary | ICD-10-CM

## 2024-01-11 DIAGNOSIS — E83.42 HYPOMAGNESEMIA: ICD-10-CM

## 2024-01-11 DIAGNOSIS — M25.561 CHRONIC PAIN OF BOTH KNEES: ICD-10-CM

## 2024-01-11 DIAGNOSIS — Z79.4 TYPE 2 DIABETES MELLITUS WITH STAGE 3A CHRONIC KIDNEY DISEASE, WITH LONG-TERM CURRENT USE OF INSULIN (HCC): Primary | ICD-10-CM

## 2024-01-11 DIAGNOSIS — M25.562 CHRONIC PAIN OF BOTH KNEES: ICD-10-CM

## 2024-01-11 DIAGNOSIS — E11.22 TYPE 2 DIABETES MELLITUS WITH STAGE 3A CHRONIC KIDNEY DISEASE, WITH LONG-TERM CURRENT USE OF INSULIN (HCC): Primary | ICD-10-CM

## 2024-01-11 DIAGNOSIS — I69.359 HEMIPARESIS AFFECTING DOMINANT SIDE AS LATE EFFECT OF CEREBROVASCULAR ACCIDENT (HCC): ICD-10-CM

## 2024-01-11 PROCEDURE — 3074F SYST BP LT 130 MM HG: CPT | Performed by: INTERNAL MEDICINE

## 2024-01-11 PROCEDURE — G8427 DOCREV CUR MEDS BY ELIG CLIN: HCPCS | Performed by: INTERNAL MEDICINE

## 2024-01-11 PROCEDURE — 2022F DILAT RTA XM EVC RTNOPTHY: CPT | Performed by: INTERNAL MEDICINE

## 2024-01-11 PROCEDURE — 1123F ACP DISCUSS/DSCN MKR DOCD: CPT | Performed by: INTERNAL MEDICINE

## 2024-01-11 PROCEDURE — 3078F DIAST BP <80 MM HG: CPT | Performed by: INTERNAL MEDICINE

## 2024-01-11 PROCEDURE — G8484 FLU IMMUNIZE NO ADMIN: HCPCS | Performed by: INTERNAL MEDICINE

## 2024-01-11 PROCEDURE — 1090F PRES/ABSN URINE INCON ASSESS: CPT | Performed by: INTERNAL MEDICINE

## 2024-01-11 PROCEDURE — 1036F TOBACCO NON-USER: CPT | Performed by: INTERNAL MEDICINE

## 2024-01-11 PROCEDURE — 99214 OFFICE O/P EST MOD 30 MIN: CPT | Performed by: INTERNAL MEDICINE

## 2024-01-11 PROCEDURE — G8417 CALC BMI ABV UP PARAM F/U: HCPCS | Performed by: INTERNAL MEDICINE

## 2024-01-11 PROCEDURE — 3046F HEMOGLOBIN A1C LEVEL >9.0%: CPT | Performed by: INTERNAL MEDICINE

## 2024-01-11 PROCEDURE — 3017F COLORECTAL CA SCREEN DOC REV: CPT | Performed by: INTERNAL MEDICINE

## 2024-01-11 PROCEDURE — G8400 PT W/DXA NO RESULTS DOC: HCPCS | Performed by: INTERNAL MEDICINE

## 2024-01-11 RX ORDER — DULAGLUTIDE 0.75 MG/.5ML
0.75 INJECTION, SOLUTION SUBCUTANEOUS WEEKLY
Qty: 1 ADJUSTABLE DOSE PRE-FILLED PEN SYRINGE | Refills: 1 | Status: SHIPPED | OUTPATIENT
Start: 2024-01-11

## 2024-01-11 RX ORDER — INSULIN GLARGINE 100 [IU]/ML
INJECTION, SOLUTION SUBCUTANEOUS
Qty: 20 ML | Refills: 1 | Status: CANCELLED | OUTPATIENT
Start: 2024-01-11

## 2024-01-11 RX ORDER — DULAGLUTIDE 0.75 MG/.5ML
0.75 INJECTION, SOLUTION SUBCUTANEOUS WEEKLY
Qty: 1 ADJUSTABLE DOSE PRE-FILLED PEN SYRINGE | Refills: 1 | Status: CANCELLED | OUTPATIENT
Start: 2024-01-11

## 2024-01-11 RX ORDER — INSULIN GLARGINE 100 [IU]/ML
10 INJECTION, SOLUTION SUBCUTANEOUS NIGHTLY
Qty: 5 ADJUSTABLE DOSE PRE-FILLED PEN SYRINGE | Refills: 3 | Status: SHIPPED | OUTPATIENT
Start: 2024-01-11

## 2024-01-11 ASSESSMENT — PATIENT HEALTH QUESTIONNAIRE - PHQ9
SUM OF ALL RESPONSES TO PHQ QUESTIONS 1-9: 7
5. POOR APPETITE OR OVEREATING: 0
2. FEELING DOWN, DEPRESSED OR HOPELESS: 3
6. FEELING BAD ABOUT YOURSELF - OR THAT YOU ARE A FAILURE OR HAVE LET YOURSELF OR YOUR FAMILY DOWN: 0
SUM OF ALL RESPONSES TO PHQ9 QUESTIONS 1 & 2: 5
3. TROUBLE FALLING OR STAYING ASLEEP: 0
4. FEELING TIRED OR HAVING LITTLE ENERGY: 0
1. LITTLE INTEREST OR PLEASURE IN DOING THINGS: 2
SUM OF ALL RESPONSES TO PHQ QUESTIONS 1-9: 7
7. TROUBLE CONCENTRATING ON THINGS, SUCH AS READING THE NEWSPAPER OR WATCHING TELEVISION: 2
8. MOVING OR SPEAKING SO SLOWLY THAT OTHER PEOPLE COULD HAVE NOTICED. OR THE OPPOSITE, BEING SO FIGETY OR RESTLESS THAT YOU HAVE BEEN MOVING AROUND A LOT MORE THAN USUAL: 0
SUM OF ALL RESPONSES TO PHQ QUESTIONS 1-9: 7
SUM OF ALL RESPONSES TO PHQ QUESTIONS 1-9: 7
9. THOUGHTS THAT YOU WOULD BE BETTER OFF DEAD, OR OF HURTING YOURSELF: 0
10. IF YOU CHECKED OFF ANY PROBLEMS, HOW DIFFICULT HAVE THESE PROBLEMS MADE IT FOR YOU TO DO YOUR WORK, TAKE CARE OF THINGS AT HOME, OR GET ALONG WITH OTHER PEOPLE: 1

## 2024-01-11 ASSESSMENT — ENCOUNTER SYMPTOMS
NAUSEA: 0
SHORTNESS OF BREATH: 0
BLOOD IN STOOL: 0
SORE THROAT: 0
ABDOMINAL PAIN: 0
COUGH: 0
VOMITING: 0

## 2024-01-11 NOTE — PROGRESS NOTES
Heath Boateng (:  1955) is a 68 y.o. female, Established patient, here for evaluation of the following chief complaint(s):  1 Month Follow-Up and Knee Pain (Arthritis in both knees)         ASSESSMENT/PLAN:  1. Type 2 diabetes mellitus with stage 3a chronic kidney disease, with long-term current use of insulin (HCC)  Chronic, uncontrolled  Advised patient to restart metformin.  Continue current dose of Trulicity and insulin.  -     insulin glargine (LANTUS SOLOSTAR) 100 UNIT/ML injection pen; Inject 10 Units into the skin nightly, Disp-5 Adjustable Dose Pre-filled Pen Syringe, R-3Normal  -     Dulaglutide (TRULICITY) 0.75 MG/0.5ML SOPN; Inject 0.75 mg into the skin once a week, Disp-1 Adjustable Dose Pre-filled Pen Syringe, R-1Normal  -     metFORMIN (GLUCOPHAGE) 500 MG tablet; Take 1 tablet by mouth daily, Disp-90 tablet, R-1Normal  2. Chronic pain of both knees  Uncontrolled  Probably due to osteoarthritis  Prescribed Voltaren gel as mentioned below  -     diclofenac sodium (VOLTAREN) 1 % GEL; Apply 4 g topically 4 times daily as needed for Pain, Topical, 4 TIMES DAILY PRN Starting Thu 2024, Disp-350 g, R-0, Normal  3. Hemiparesis affecting dominant side as late effect of cerebrovascular accident (HCC)  - Stable   - Continue current dose of atorvastatin   4. Hypomagnesemia  - Stable   - Continue current dose of magnesium oxide  -     Magnesium  6. Essential hypertension  - Stable   - Continue current dose of lisinopril hydrochlorothiazide      Return in about 6 weeks (around 2024) for Diabetes.         Subjective   SUBJECTIVE/OBJECTIVE:  Diabetes  She presents for her follow-up diabetic visit. She has type 2 diabetes mellitus. Her disease course has been worsening. Pertinent negatives for hypoglycemia include no dizziness. Pertinent negatives for diabetes include no chest pain, no fatigue and no weakness. Current diabetic treatment includes insulin injections (Trulicity). She is compliant with

## 2024-01-12 DIAGNOSIS — N18.31 TYPE 2 DIABETES MELLITUS WITH STAGE 3A CHRONIC KIDNEY DISEASE, WITH LONG-TERM CURRENT USE OF INSULIN (HCC): ICD-10-CM

## 2024-01-12 DIAGNOSIS — Z79.4 TYPE 2 DIABETES MELLITUS WITH STAGE 3A CHRONIC KIDNEY DISEASE, WITH LONG-TERM CURRENT USE OF INSULIN (HCC): ICD-10-CM

## 2024-01-12 DIAGNOSIS — E11.22 TYPE 2 DIABETES MELLITUS WITH STAGE 3A CHRONIC KIDNEY DISEASE, WITH LONG-TERM CURRENT USE OF INSULIN (HCC): ICD-10-CM

## 2024-01-12 RX ORDER — DULAGLUTIDE 0.75 MG/.5ML
INJECTION, SOLUTION SUBCUTANEOUS
Qty: 2 ML | OUTPATIENT
Start: 2024-01-12

## 2024-02-16 DIAGNOSIS — I10 ESSENTIAL HYPERTENSION: ICD-10-CM

## 2024-02-16 RX ORDER — LISINOPRIL AND HYDROCHLOROTHIAZIDE 20; 12.5 MG/1; MG/1
1 TABLET ORAL 2 TIMES DAILY
Qty: 180 TABLET | Refills: 0 | Status: SHIPPED | OUTPATIENT
Start: 2024-02-16

## 2024-02-16 NOTE — TELEPHONE ENCOUNTER
Future Appointments   Date Time Provider Department Center   2/22/2024  1:30 PM Carlitos Fleming MD [facility]

## 2024-02-22 ENCOUNTER — OFFICE VISIT (OUTPATIENT)
Dept: INTERNAL MEDICINE CLINIC | Age: 69
End: 2024-02-22
Payer: MEDICARE

## 2024-02-22 VITALS
HEART RATE: 87 BPM | HEIGHT: 63 IN | OXYGEN SATURATION: 97 % | WEIGHT: 176 LBS | DIASTOLIC BLOOD PRESSURE: 65 MMHG | SYSTOLIC BLOOD PRESSURE: 123 MMHG | BODY MASS INDEX: 31.18 KG/M2

## 2024-02-22 DIAGNOSIS — E11.22 TYPE 2 DIABETES MELLITUS WITH STAGE 2 CHRONIC KIDNEY DISEASE, WITH LONG-TERM CURRENT USE OF INSULIN (HCC): Primary | ICD-10-CM

## 2024-02-22 DIAGNOSIS — N18.2 TYPE 2 DIABETES MELLITUS WITH STAGE 2 CHRONIC KIDNEY DISEASE, WITH LONG-TERM CURRENT USE OF INSULIN (HCC): Primary | ICD-10-CM

## 2024-02-22 DIAGNOSIS — Z79.4 TYPE 2 DIABETES MELLITUS WITH STAGE 2 CHRONIC KIDNEY DISEASE, WITH LONG-TERM CURRENT USE OF INSULIN (HCC): Primary | ICD-10-CM

## 2024-02-22 DIAGNOSIS — F32.9 REACTIVE DEPRESSION: ICD-10-CM

## 2024-02-22 DIAGNOSIS — E83.42 HYPOMAGNESEMIA: ICD-10-CM

## 2024-02-22 DIAGNOSIS — I69.359 HEMIPARESIS AFFECTING DOMINANT SIDE AS LATE EFFECT OF CEREBROVASCULAR ACCIDENT (HCC): ICD-10-CM

## 2024-02-22 DIAGNOSIS — K21.9 GASTROESOPHAGEAL REFLUX DISEASE, UNSPECIFIED WHETHER ESOPHAGITIS PRESENT: ICD-10-CM

## 2024-02-22 DIAGNOSIS — E78.2 MIXED HYPERLIPIDEMIA: ICD-10-CM

## 2024-02-22 DIAGNOSIS — I10 ESSENTIAL HYPERTENSION: ICD-10-CM

## 2024-02-22 LAB — MAGNESIUM SERPL-MCNC: 1.9 MG/DL (ref 1.8–2.4)

## 2024-02-22 PROCEDURE — 36415 COLL VENOUS BLD VENIPUNCTURE: CPT | Performed by: INTERNAL MEDICINE

## 2024-02-22 PROCEDURE — G8400 PT W/DXA NO RESULTS DOC: HCPCS | Performed by: INTERNAL MEDICINE

## 2024-02-22 PROCEDURE — 1036F TOBACCO NON-USER: CPT | Performed by: INTERNAL MEDICINE

## 2024-02-22 PROCEDURE — 3078F DIAST BP <80 MM HG: CPT | Performed by: INTERNAL MEDICINE

## 2024-02-22 PROCEDURE — 3046F HEMOGLOBIN A1C LEVEL >9.0%: CPT | Performed by: INTERNAL MEDICINE

## 2024-02-22 PROCEDURE — G8427 DOCREV CUR MEDS BY ELIG CLIN: HCPCS | Performed by: INTERNAL MEDICINE

## 2024-02-22 PROCEDURE — 1090F PRES/ABSN URINE INCON ASSESS: CPT | Performed by: INTERNAL MEDICINE

## 2024-02-22 PROCEDURE — 1123F ACP DISCUSS/DSCN MKR DOCD: CPT | Performed by: INTERNAL MEDICINE

## 2024-02-22 PROCEDURE — 2022F DILAT RTA XM EVC RTNOPTHY: CPT | Performed by: INTERNAL MEDICINE

## 2024-02-22 PROCEDURE — 99214 OFFICE O/P EST MOD 30 MIN: CPT | Performed by: INTERNAL MEDICINE

## 2024-02-22 PROCEDURE — 3074F SYST BP LT 130 MM HG: CPT | Performed by: INTERNAL MEDICINE

## 2024-02-22 PROCEDURE — 3017F COLORECTAL CA SCREEN DOC REV: CPT | Performed by: INTERNAL MEDICINE

## 2024-02-22 PROCEDURE — G8417 CALC BMI ABV UP PARAM F/U: HCPCS | Performed by: INTERNAL MEDICINE

## 2024-02-22 PROCEDURE — G8484 FLU IMMUNIZE NO ADMIN: HCPCS | Performed by: INTERNAL MEDICINE

## 2024-02-22 RX ORDER — INSULIN GLARGINE 100 [IU]/ML
10 INJECTION, SOLUTION SUBCUTANEOUS NIGHTLY
Qty: 5 ADJUSTABLE DOSE PRE-FILLED PEN SYRINGE | Refills: 3 | Status: SHIPPED | OUTPATIENT
Start: 2024-02-22

## 2024-02-22 RX ORDER — SYRINGE-NEEDLE,INSULIN,0.5 ML 27GX1/2"
1 SYRINGE, EMPTY DISPOSABLE MISCELLANEOUS 3 TIMES DAILY
Qty: 100 EACH | Refills: 3 | Status: CANCELLED | OUTPATIENT
Start: 2024-02-22

## 2024-02-22 ASSESSMENT — ENCOUNTER SYMPTOMS
BLOOD IN STOOL: 0
SORE THROAT: 0
COUGH: 0
NAUSEA: 0
ABDOMINAL PAIN: 0
SHORTNESS OF BREATH: 0
VOMITING: 0

## 2024-02-22 NOTE — PROGRESS NOTES
Heath Boateng (:  1955) is a 68 y.o. female, Established patient, here for evaluation of the following chief complaint(s):  1 Month Follow-Up and Diabetes         ASSESSMENT/PLAN:  1. Type 2 diabetes mellitus with stage 2 chronic kidney disease, with long-term current use of insulin (HCC)  Improving  Continue current dose of insulin  Advised patient to increase the dose of metformin to 500 mg twice daily  -     insulin glargine (LANTUS SOLOSTAR) 100 UNIT/ML injection pen; Inject 10 Units into the skin nightly, Disp-5 Adjustable Dose Pre-filled Pen Syringe, R-3Normal  -     Insulin Pen Needle 32G X 5 MM MISC; DAILY Starting Thu 2024, Disp-100 each, R-3, Normal  -     metFORMIN (GLUCOPHAGE) 500 MG tablet; Take 1 tablet by mouth 2 times daily (with meals), Disp-180 tablet, R-1Normal  -     Hemoglobin A1C  2. Essential hypertension  - Stable   - Continue current dose of lisinopril hydrochlorothiazide  3. Hemiparesis affecting dominant side as late effect of cerebrovascular accident (HCC)  - Stable   - Continue current dose of atorvastatin   4. Hypomagnesemia  - Stable   - Continue current dose of magnesium oxide  -     Magnesium  5. Gastroesophageal reflux disease, unspecified whether esophagitis present  - Stable   - Continue current dose of Nexium  6. Mixed hyperlipidemia  - Stable   - Continue current dose of atorvastatin  7. Reactive depression  Referring to psychiatrist as per the request of patient and her caretaker  -     Ambulatory referral to Psychiatry      Return in about 3 months (around 2024).         Subjective   SUBJECTIVE/OBJECTIVE:  Diabetes  She presents for her follow-up diabetic visit. She has type 2 diabetes mellitus. Her disease course has been improving. Pertinent negatives for hypoglycemia include no dizziness. Pertinent negatives for diabetes include no chest pain, no fatigue and no weakness. Current diabetic treatment includes insulin injections and oral agent (monotherapy).

## 2024-02-23 LAB
EST. AVERAGE GLUCOSE BLD GHB EST-MCNC: 157.1 MG/DL
HBA1C MFR BLD: 7.1 %

## 2024-03-05 DIAGNOSIS — Z79.4 TYPE 2 DIABETES MELLITUS WITH STAGE 3A CHRONIC KIDNEY DISEASE, WITH LONG-TERM CURRENT USE OF INSULIN (HCC): ICD-10-CM

## 2024-03-05 DIAGNOSIS — N18.31 TYPE 2 DIABETES MELLITUS WITH STAGE 3A CHRONIC KIDNEY DISEASE, WITH LONG-TERM CURRENT USE OF INSULIN (HCC): ICD-10-CM

## 2024-03-05 DIAGNOSIS — E11.22 TYPE 2 DIABETES MELLITUS WITH STAGE 3A CHRONIC KIDNEY DISEASE, WITH LONG-TERM CURRENT USE OF INSULIN (HCC): ICD-10-CM

## 2024-03-05 NOTE — TELEPHONE ENCOUNTER
Future Appointments   Date Time Provider Department Center   5/22/2024 11:00 AM Carlitos Fleming MD [facility]         Last appt 2/22/24

## 2024-03-07 RX ORDER — DULAGLUTIDE 0.75 MG/.5ML
INJECTION, SOLUTION SUBCUTANEOUS
Qty: 2 ML | OUTPATIENT
Start: 2024-03-07

## 2024-05-17 DIAGNOSIS — N18.2 TYPE 2 DIABETES MELLITUS WITH STAGE 2 CHRONIC KIDNEY DISEASE, WITH LONG-TERM CURRENT USE OF INSULIN (HCC): ICD-10-CM

## 2024-05-17 DIAGNOSIS — Z79.4 TYPE 2 DIABETES MELLITUS WITH STAGE 2 CHRONIC KIDNEY DISEASE, WITH LONG-TERM CURRENT USE OF INSULIN (HCC): ICD-10-CM

## 2024-05-17 DIAGNOSIS — E11.22 TYPE 2 DIABETES MELLITUS WITH STAGE 2 CHRONIC KIDNEY DISEASE, WITH LONG-TERM CURRENT USE OF INSULIN (HCC): ICD-10-CM

## 2024-05-17 NOTE — TELEPHONE ENCOUNTER
Pt sister called requesting a refill of insulin glargine (LANTUS SOLOSTAR) 100 UNIT/ML injection pen   She will not have enough to last her till upcomming appt on the 22nd.     Please send refill to:  [facility] [id number] - [place] [id number] - P [phone] - F [phone]

## 2024-05-19 RX ORDER — INSULIN GLARGINE 100 [IU]/ML
10 INJECTION, SOLUTION SUBCUTANEOUS NIGHTLY
Qty: 5 ADJUSTABLE DOSE PRE-FILLED PEN SYRINGE | Refills: 3 | Status: SHIPPED | OUTPATIENT
Start: 2024-05-19

## 2024-05-28 DIAGNOSIS — I10 ESSENTIAL HYPERTENSION: ICD-10-CM

## 2024-05-28 RX ORDER — LISINOPRIL AND HYDROCHLOROTHIAZIDE 20; 12.5 MG/1; MG/1
1 TABLET ORAL 2 TIMES DAILY
Qty: 180 TABLET | Refills: 0 | Status: SHIPPED | OUTPATIENT
Start: 2024-05-28

## 2024-05-28 NOTE — TELEPHONE ENCOUNTER
Medication:   Requested Prescriptions     Pending Prescriptions Disp Refills    lisinopril-hydroCHLOROthiazide (PRINZIDE;ZESTORETIC) 20-12.5 MG per tablet 180 tablet 0     Sig: Take 1 tablet by mouth 2 times daily       Last Filled:  2.16.24    Patient Phone Number: [phone] (home)     Last appt: 2/22/2024   Next appt: 5/29/2024  Future Appointments   Date Time Provider Department Center   5/29/2024 11:00 AM Carlitos Fleming MD [facility]

## 2024-05-29 ENCOUNTER — OFFICE VISIT (OUTPATIENT)
Dept: INTERNAL MEDICINE CLINIC | Age: 69
End: 2024-05-29
Payer: MEDICARE

## 2024-05-29 VITALS
DIASTOLIC BLOOD PRESSURE: 72 MMHG | SYSTOLIC BLOOD PRESSURE: 130 MMHG | BODY MASS INDEX: 31.01 KG/M2 | HEART RATE: 67 BPM | OXYGEN SATURATION: 99 % | WEIGHT: 175 LBS

## 2024-05-29 DIAGNOSIS — I10 ESSENTIAL HYPERTENSION: ICD-10-CM

## 2024-05-29 DIAGNOSIS — E83.42 HYPOMAGNESEMIA: ICD-10-CM

## 2024-05-29 DIAGNOSIS — E78.2 MIXED HYPERLIPIDEMIA: ICD-10-CM

## 2024-05-29 DIAGNOSIS — N18.2 TYPE 2 DIABETES MELLITUS WITH STAGE 2 CHRONIC KIDNEY DISEASE, WITH LONG-TERM CURRENT USE OF INSULIN (HCC): Primary | ICD-10-CM

## 2024-05-29 DIAGNOSIS — E11.22 TYPE 2 DIABETES MELLITUS WITH STAGE 2 CHRONIC KIDNEY DISEASE, WITH LONG-TERM CURRENT USE OF INSULIN (HCC): Primary | ICD-10-CM

## 2024-05-29 DIAGNOSIS — K21.9 GASTROESOPHAGEAL REFLUX DISEASE, UNSPECIFIED WHETHER ESOPHAGITIS PRESENT: ICD-10-CM

## 2024-05-29 DIAGNOSIS — Z78.0 POST-MENOPAUSAL: ICD-10-CM

## 2024-05-29 DIAGNOSIS — I69.359 HEMIPARESIS AFFECTING DOMINANT SIDE AS LATE EFFECT OF CEREBROVASCULAR ACCIDENT (HCC): ICD-10-CM

## 2024-05-29 DIAGNOSIS — Z79.4 TYPE 2 DIABETES MELLITUS WITH STAGE 2 CHRONIC KIDNEY DISEASE, WITH LONG-TERM CURRENT USE OF INSULIN (HCC): Primary | ICD-10-CM

## 2024-05-29 LAB
ANION GAP SERPL CALCULATED.3IONS-SCNC: 15 MMOL/L (ref 3–16)
BUN SERPL-MCNC: 22 MG/DL (ref 7–20)
CALCIUM SERPL-MCNC: 9.7 MG/DL (ref 8.3–10.6)
CHLORIDE SERPL-SCNC: 102 MMOL/L (ref 99–110)
CO2 SERPL-SCNC: 22 MMOL/L (ref 21–32)
CREAT SERPL-MCNC: 1.1 MG/DL (ref 0.6–1.2)
GFR SERPLBLD CREATININE-BSD FMLA CKD-EPI: 55 ML/MIN/{1.73_M2}
GLUCOSE SERPL-MCNC: 173 MG/DL (ref 70–99)
MAGNESIUM SERPL-MCNC: 1.8 MG/DL (ref 1.8–2.4)
POTASSIUM SERPL-SCNC: 4.9 MMOL/L (ref 3.5–5.1)
SODIUM SERPL-SCNC: 139 MMOL/L (ref 136–145)

## 2024-05-29 PROCEDURE — 1090F PRES/ABSN URINE INCON ASSESS: CPT | Performed by: INTERNAL MEDICINE

## 2024-05-29 PROCEDURE — 1036F TOBACCO NON-USER: CPT | Performed by: INTERNAL MEDICINE

## 2024-05-29 PROCEDURE — 2022F DILAT RTA XM EVC RTNOPTHY: CPT | Performed by: INTERNAL MEDICINE

## 2024-05-29 PROCEDURE — G8400 PT W/DXA NO RESULTS DOC: HCPCS | Performed by: INTERNAL MEDICINE

## 2024-05-29 PROCEDURE — 99214 OFFICE O/P EST MOD 30 MIN: CPT | Performed by: INTERNAL MEDICINE

## 2024-05-29 PROCEDURE — G8417 CALC BMI ABV UP PARAM F/U: HCPCS | Performed by: INTERNAL MEDICINE

## 2024-05-29 PROCEDURE — 3017F COLORECTAL CA SCREEN DOC REV: CPT | Performed by: INTERNAL MEDICINE

## 2024-05-29 PROCEDURE — 1123F ACP DISCUSS/DSCN MKR DOCD: CPT | Performed by: INTERNAL MEDICINE

## 2024-05-29 PROCEDURE — 3075F SYST BP GE 130 - 139MM HG: CPT | Performed by: INTERNAL MEDICINE

## 2024-05-29 PROCEDURE — 3051F HG A1C>EQUAL 7.0%<8.0%: CPT | Performed by: INTERNAL MEDICINE

## 2024-05-29 PROCEDURE — 3078F DIAST BP <80 MM HG: CPT | Performed by: INTERNAL MEDICINE

## 2024-05-29 PROCEDURE — G8427 DOCREV CUR MEDS BY ELIG CLIN: HCPCS | Performed by: INTERNAL MEDICINE

## 2024-05-29 PROCEDURE — G2211 COMPLEX E/M VISIT ADD ON: HCPCS | Performed by: INTERNAL MEDICINE

## 2024-05-29 RX ORDER — INSULIN GLARGINE 100 [IU]/ML
10 INJECTION, SOLUTION SUBCUTANEOUS NIGHTLY
Qty: 5 ADJUSTABLE DOSE PRE-FILLED PEN SYRINGE | Refills: 3 | Status: SHIPPED | OUTPATIENT
Start: 2024-05-29

## 2024-05-29 ASSESSMENT — ENCOUNTER SYMPTOMS
NAUSEA: 0
COUGH: 0
ABDOMINAL PAIN: 0
VOMITING: 0
SORE THROAT: 0
SHORTNESS OF BREATH: 0
BLOOD IN STOOL: 0

## 2024-05-29 NOTE — PROGRESS NOTES
Heath Boateng (:  1955) is a 68 y.o. female, Established patient, here for evaluation of the following chief complaint(s):  Diabetes (3 month f/u-diabetes)      Assessment & Plan   ASSESSMENT/PLAN:  1. Type 2 diabetes mellitus with stage 2 chronic kidney disease, with long-term current use of insulin (HCC)  Stable  Continue current dose of insulin and metformin  -     Basic Metabolic Panel  -     Hemoglobin A1C  -     Insulin Pen Needle 32G X 5 MM MISC; DAILY Starting Wed 2024, Disp-100 each, R-3, Normal  -     insulin glargine (LANTUS SOLOSTAR) 100 UNIT/ML injection pen; Inject 10 Units into the skin nightly, Disp-5 Adjustable Dose Pre-filled Pen Syringe, R-3Normal  2. Essential hypertension  - Stable   - Continue current dose of lisinopril hydrochlorothiazide  3. Hemiparesis affecting dominant side as late effect of cerebrovascular accident (HCC)  - Stable   - Continue current dose of atorvastatin   4. Hypomagnesemia  - Stable   - Continue current dose of magnesium oxide  -     Magnesium  5. Gastroesophageal reflux disease, unspecified whether esophagitis present  - Stable   - Continue current dose of Nexium  6. Mixed hyperlipidemia  - Stable   - Continue current dose of atorvastatin  7. Post-menopausal  -     DEXA Bone Density Axial Skeleton; Future      Return in about 3 months (around 2024).         Subjective   SUBJECTIVE/OBJECTIVE:  Diabetes  She presents for her follow-up diabetic visit. She has type 2 diabetes mellitus. Her disease course has been stable. Pertinent negatives for hypoglycemia include no dizziness. Pertinent negatives for diabetes include no chest pain, no fatigue and no weakness. Current diabetic treatment includes insulin injections and oral agent (monotherapy). She is compliant with treatment all of the time. She is following a diabetic diet. An ACE inhibitor/angiotensin II receptor blocker is being taken.   Hypertension  This is a chronic problem. The current episode

## 2024-05-30 LAB
EST. AVERAGE GLUCOSE BLD GHB EST-MCNC: 139.9 MG/DL
HBA1C MFR BLD: 6.5 %

## 2024-06-20 DIAGNOSIS — N39.41 URGE INCONTINENCE: ICD-10-CM

## 2024-06-20 RX ORDER — OXYBUTYNIN CHLORIDE 10 MG/1
TABLET, EXTENDED RELEASE ORAL
Qty: 90 TABLET | Refills: 1 | Status: SHIPPED | OUTPATIENT
Start: 2024-06-20

## 2024-06-22 DIAGNOSIS — K21.9 GASTROESOPHAGEAL REFLUX DISEASE, UNSPECIFIED WHETHER ESOPHAGITIS PRESENT: ICD-10-CM

## 2024-06-24 RX ORDER — ESOMEPRAZOLE MAGNESIUM 40 MG/1
CAPSULE, DELAYED RELEASE ORAL
Qty: 90 CAPSULE | Refills: 1 | Status: SHIPPED | OUTPATIENT
Start: 2024-06-24

## 2024-06-26 DIAGNOSIS — E78.2 MIXED HYPERLIPIDEMIA: ICD-10-CM

## 2024-06-26 RX ORDER — ATORVASTATIN CALCIUM 20 MG/1
TABLET, FILM COATED ORAL
Qty: 90 TABLET | Refills: 1 | Status: SHIPPED | OUTPATIENT
Start: 2024-06-26

## 2024-08-05 ENCOUNTER — COMMUNITY OUTREACH (OUTPATIENT)
Dept: INTERNAL MEDICINE CLINIC | Age: 69
End: 2024-08-05

## 2024-08-19 DIAGNOSIS — E11.22 TYPE 2 DIABETES MELLITUS WITH STAGE 2 CHRONIC KIDNEY DISEASE, WITH LONG-TERM CURRENT USE OF INSULIN (HCC): ICD-10-CM

## 2024-08-19 DIAGNOSIS — Z79.4 TYPE 2 DIABETES MELLITUS WITH STAGE 2 CHRONIC KIDNEY DISEASE, WITH LONG-TERM CURRENT USE OF INSULIN (HCC): ICD-10-CM

## 2024-08-19 DIAGNOSIS — N18.2 TYPE 2 DIABETES MELLITUS WITH STAGE 2 CHRONIC KIDNEY DISEASE, WITH LONG-TERM CURRENT USE OF INSULIN (HCC): ICD-10-CM

## 2024-08-21 DIAGNOSIS — E83.42 HYPOMAGNESEMIA: ICD-10-CM

## 2024-08-22 RX ORDER — LANOLIN ALCOHOL/MO/W.PET/CERES
400 CREAM (GRAM) TOPICAL 2 TIMES DAILY
Qty: 60 TABLET | Refills: 0 | Status: SHIPPED | OUTPATIENT
Start: 2024-08-22

## 2024-08-22 NOTE — TELEPHONE ENCOUNTER
Future Appointments   Date Time Provider Department Center   8/29/2024 12:30 PM Josie Kirby APRN [facility]     Last appt 02/22/24

## 2024-08-23 DIAGNOSIS — I10 ESSENTIAL HYPERTENSION: ICD-10-CM

## 2024-08-23 RX ORDER — LISINOPRIL AND HYDROCHLOROTHIAZIDE 20; 12.5 MG/1; MG/1
1 TABLET ORAL 2 TIMES DAILY
Qty: 180 TABLET | Refills: 0 | Status: SHIPPED | OUTPATIENT
Start: 2024-08-23

## 2024-08-27 NOTE — PROGRESS NOTES
Date: 8/29/2024                                               Subjective/Objective:     Chief Complaint   Patient presents with    Established New Doctor       GLADYS    Heath Boateng is a 68 yo female, visit today to establish care from Dr Fleming. She is accompanied by her sister today. Her sister is her caregiver, manages her medications. Mentions she doesn't take them like she should, she \"sleeps all the time.\" She is incontinent of urine, uses depends.     Hyperlipidemia - on atorvastatin.     GERD - on esomeprazole.     Diabetes - on lantus 10 units nightly and metformin. Denies polys. Home glucose - does not have readings, have been \"high.\" Eye exam is not current.     Hypertension - on lisinopril/HCTZ. Denies chest pain, SOB and HA.     CVA - pt/family unsure of how long ago (they think around 2022) - right sided hemiparesis.     Colon cancer screening: declines  Lung Cancer Screening: declines  Gyn:               Last Mammogram: declines  Osteoporosis Screening: declines             Patient Active Problem List    Diagnosis Date Noted    Chronic renal disease, stage III ([facility]) [279471] 10/25/2023    DM2 (diabetes mellitus, type 2) ([facility]) 06/05/2023    HTN (hypertension) 06/05/2023    Wound infection 06/05/2023    Abrasion of left index finger with infection 06/04/2023    Type 2 diabetes mellitus with complication, with long-term current use of insulin ([facility]) 10/14/2021    Onychomycosis of multiple toenails with type 2 diabetes mellitus ([facility]) 09/26/2018    Morbid obesity ([facility]) 09/26/2018    Hemiparesis affecting dominant side as late effect of cerebrovascular accident ([facility]) 09/26/2018    Gastroesophageal reflux disease without esophagitis 09/26/2018    Urge incontinence 09/26/2018    Chronic pain syndrome 06/24/2016    Diabetic nephropathy associated with type 2 diabetes mellitus ([facility]) 06/24/2016    Mixed hyperlipidemia     Essential hypertension     Stroke ([facility])     Anxiety        Past Medical History:  TEST BLOOD SUGAR TWO TIMES A DAY AS NEEDED 150 strip 12     No current facility-administered medications for this visit.       Allergies   Allergen Reactions    Ampicillin     Penicillins        Review of Systems   Constitutional:  Negative for chills and fever.   Respiratory:  Negative for shortness of breath.    Cardiovascular:  Negative for chest pain.   Gastrointestinal:  Negative for constipation.   Endocrine: Negative for polydipsia, polyphagia and polyuria.   Neurological:  Positive for weakness.        Right sided weakness s/p CVA       Vitals:  BP (!) 140/80 (Site: Left Upper Arm, Position: Sitting, Cuff Size: Medium Adult)   Pulse 78   Ht 1.6 m (5' 2.99\")   Wt 78 kg (172 lb)   SpO2 95%   BMI 30.48 kg/m²     Physical Exam  Vitals reviewed.   Constitutional:       General: She is not in acute distress.  Cardiovascular:      Rate and Rhythm: Normal rate and regular rhythm.      Heart sounds: Normal heart sounds.   Pulmonary:      Breath sounds: Normal breath sounds.   Abdominal:      General: Bowel sounds are normal.      Palpations: Abdomen is soft.      Tenderness: There is no abdominal tenderness.   Musculoskeletal:      Comments: Right arm and leg weakness    Skin:     General: Skin is warm and dry.   Neurological:      Mental Status: She is alert and oriented to person, place, and time.   Psychiatric:         Behavior: Behavior normal.         Assessment/Plan     1. Type 2 diabetes mellitus with stage 2 chronic kidney disease, with long-term current use of insulin (HCC): stable, POC A1C 6.5.   - POCT glycosylated hemoglobin (Hb A1C)   - Continue lantus and metformin    - On statin   - Follow up 3 months    2. Essential hypertension: uncontrolled    - Continue lisinopril/HCTZ. No medication adjustments made as patient has not regularly been taking her medications.    -Medication adherence encouraged, discussed ways to improve medication adherence with patient sister who is her caregiver.    3. Mixed

## 2024-08-29 ENCOUNTER — OFFICE VISIT (OUTPATIENT)
Dept: INTERNAL MEDICINE CLINIC | Age: 69
End: 2024-08-29

## 2024-08-29 VITALS
BODY MASS INDEX: 30.48 KG/M2 | WEIGHT: 172 LBS | OXYGEN SATURATION: 95 % | HEIGHT: 63 IN | DIASTOLIC BLOOD PRESSURE: 80 MMHG | SYSTOLIC BLOOD PRESSURE: 140 MMHG | HEART RATE: 78 BPM

## 2024-08-29 DIAGNOSIS — I63.9 CEREBROVASCULAR ACCIDENT (CVA), UNSPECIFIED MECHANISM (HCC): ICD-10-CM

## 2024-08-29 DIAGNOSIS — Z79.4 TYPE 2 DIABETES MELLITUS WITH STAGE 2 CHRONIC KIDNEY DISEASE, WITH LONG-TERM CURRENT USE OF INSULIN (HCC): Primary | ICD-10-CM

## 2024-08-29 DIAGNOSIS — N18.2 TYPE 2 DIABETES MELLITUS WITH STAGE 2 CHRONIC KIDNEY DISEASE, WITH LONG-TERM CURRENT USE OF INSULIN (HCC): Primary | ICD-10-CM

## 2024-08-29 DIAGNOSIS — I10 ESSENTIAL HYPERTENSION: Chronic | ICD-10-CM

## 2024-08-29 DIAGNOSIS — E11.22 TYPE 2 DIABETES MELLITUS WITH STAGE 2 CHRONIC KIDNEY DISEASE, WITH LONG-TERM CURRENT USE OF INSULIN (HCC): Primary | ICD-10-CM

## 2024-08-29 DIAGNOSIS — E78.2 MIXED HYPERLIPIDEMIA: ICD-10-CM

## 2024-08-29 DIAGNOSIS — F32.9 REACTIVE DEPRESSION: ICD-10-CM

## 2024-08-29 LAB — HBA1C MFR BLD: 6.5 %

## 2024-08-29 SDOH — ECONOMIC STABILITY: FOOD INSECURITY: WITHIN THE PAST 12 MONTHS, YOU WORRIED THAT YOUR FOOD WOULD RUN OUT BEFORE YOU GOT MONEY TO BUY MORE.: PATIENT DECLINED

## 2024-08-29 SDOH — ECONOMIC STABILITY: FOOD INSECURITY: WITHIN THE PAST 12 MONTHS, THE FOOD YOU BOUGHT JUST DIDN'T LAST AND YOU DIDN'T HAVE MONEY TO GET MORE.: PATIENT DECLINED

## 2024-08-29 SDOH — ECONOMIC STABILITY: INCOME INSECURITY: HOW HARD IS IT FOR YOU TO PAY FOR THE VERY BASICS LIKE FOOD, HOUSING, MEDICAL CARE, AND HEATING?: PATIENT DECLINED

## 2024-08-29 ASSESSMENT — ENCOUNTER SYMPTOMS
CONSTIPATION: 0
SHORTNESS OF BREATH: 0

## 2024-11-09 ENCOUNTER — TELEPHONE (OUTPATIENT)
Dept: FAMILY MEDICINE CLINIC | Age: 69
End: 2024-11-09

## 2024-11-09 NOTE — TELEPHONE ENCOUNTER
Called by  ED  stated quickly deteriorated and passed away   Wanting someone in your office to do death certificate  I told them as I did not work there I could not answer for sure but likely  I did try to update care everywhere but could not get the records

## (undated) DEVICE — GLOVE SURG SZ 8 L12IN FNGR THK79MIL GRN LTX FREE

## (undated) DEVICE — SOLUTION IV 1000ML 0.9% SOD CHL FOR IRRIG PLAS CONT

## (undated) DEVICE — DRESSING PETRO W3XL3IN OIL EMUL N ADH GZ KNIT IMPREG CELOS

## (undated) DEVICE — CONTAINER SPEC 16OZ W/LID

## (undated) DEVICE — HAND AND ELBOW: Brand: MEDLINE INDUSTRIES, INC.

## (undated) DEVICE — SPONGE GZ W4XL4IN COT 12 PLY TYP VII WVN C FLD DSGN STERILE

## (undated) DEVICE — PAD,ABDOMINAL,8"X7.5",STERILE,LF,1/PK: Brand: MEDLINE

## (undated) DEVICE — 3M™ COBAN™ NL STERILE NON-LATEX SELF-ADHERENT WRAP, 2084S, 4 IN X 5 YD (10 CM X 4,5 M), 18 ROLLS/CASE: Brand: 3M™ COBAN™

## (undated) DEVICE — PADDING UNDERCAST W4INXL4YD 100% COT CRIMPED FINISH WBRL II

## (undated) DEVICE — Y-TYPE TUR/BLADDER IRRIGATION SET, REGULATING CLAMP

## (undated) DEVICE — GLOVE SURG SZ 75 CRM LTX FREE POLYISOPRENE POLYMER BEAD ANTI

## (undated) DEVICE — GOWN SIRUS NONREIN XL W/TWL: Brand: MEDLINE INDUSTRIES, INC.

## (undated) DEVICE — SPONGE LAP W18XL18IN WHT COT 4 PLY FLD STRUNG RADPQ DISP ST 2 PER PACK